# Patient Record
Sex: MALE | Race: ASIAN | NOT HISPANIC OR LATINO | Employment: FULL TIME | URBAN - METROPOLITAN AREA
[De-identification: names, ages, dates, MRNs, and addresses within clinical notes are randomized per-mention and may not be internally consistent; named-entity substitution may affect disease eponyms.]

---

## 2017-08-25 ENCOUNTER — GENERIC CONVERSION - ENCOUNTER (OUTPATIENT)
Dept: OTHER | Facility: OTHER | Age: 22
End: 2017-08-25

## 2017-08-25 ENCOUNTER — APPOINTMENT (EMERGENCY)
Dept: CT IMAGING | Facility: HOSPITAL | Age: 22
DRG: 199 | End: 2017-08-25
Payer: COMMERCIAL

## 2017-08-25 ENCOUNTER — APPOINTMENT (EMERGENCY)
Dept: RADIOLOGY | Facility: HOSPITAL | Age: 22
DRG: 199 | End: 2017-08-25
Payer: COMMERCIAL

## 2017-08-25 ENCOUNTER — HOSPITAL ENCOUNTER (INPATIENT)
Facility: HOSPITAL | Age: 22
LOS: 4 days | Discharge: HOME | DRG: 199 | End: 2017-08-29
Attending: EMERGENCY MEDICINE | Admitting: GENERAL PRACTICE
Payer: COMMERCIAL

## 2017-08-25 DIAGNOSIS — J18.9 BILATERAL PNEUMONIA: Primary | ICD-10-CM

## 2017-08-25 DIAGNOSIS — J93.83 SPONTANEOUS PNEUMOTHORAX: ICD-10-CM

## 2017-08-25 LAB
ANION GAP SERPL CALCULATED.3IONS-SCNC: 9 MMOL/L (ref 4–13)
APTT PPP: 28 SECONDS (ref 23–35)
ATRIAL RATE: 99 BPM
BASOPHILS # BLD AUTO: 0.01 THOUSANDS/ΜL (ref 0–0.1)
BASOPHILS NFR BLD AUTO: 0 % (ref 0–1)
BUN SERPL-MCNC: 13 MG/DL (ref 5–25)
CALCIUM SERPL-MCNC: 9.6 MG/DL (ref 8.3–10.1)
CHLORIDE SERPL-SCNC: 105 MMOL/L (ref 100–108)
CO2 SERPL-SCNC: 29 MMOL/L (ref 21–32)
CREAT SERPL-MCNC: 1.07 MG/DL (ref 0.6–1.3)
EOSINOPHIL # BLD AUTO: 0.05 THOUSAND/ΜL (ref 0–0.61)
EOSINOPHIL NFR BLD AUTO: 1 % (ref 0–6)
ERYTHROCYTE [DISTWIDTH] IN BLOOD BY AUTOMATED COUNT: 12 % (ref 11.6–15.1)
GFR SERPL CREATININE-BSD FRML MDRD: 97 ML/MIN/1.73SQ M
GLUCOSE SERPL-MCNC: 86 MG/DL (ref 65–140)
HCT VFR BLD AUTO: 43.7 % (ref 36.5–49.3)
HGB BLD-MCNC: 15 G/DL (ref 12–17)
HIV 1+2 AB+HIV1 P24 AG SERPL QL IA: NORMAL
HIV1 P24 AG SER QL: NORMAL
INR PPP: 0.9 (ref 0.86–1.16)
LACTATE SERPL-SCNC: 0.6 MMOL/L (ref 0.5–2)
LYMPHOCYTES # BLD AUTO: 1.64 THOUSANDS/ΜL (ref 0.6–4.47)
LYMPHOCYTES NFR BLD AUTO: 25 % (ref 14–44)
MCH RBC QN AUTO: 31.1 PG (ref 26.8–34.3)
MCHC RBC AUTO-ENTMCNC: 34.3 G/DL (ref 31.4–37.4)
MCV RBC AUTO: 91 FL (ref 82–98)
MONOCYTES # BLD AUTO: 0.44 THOUSAND/ΜL (ref 0.17–1.22)
MONOCYTES NFR BLD AUTO: 7 % (ref 4–12)
NEUTROPHILS # BLD AUTO: 4.42 THOUSANDS/ΜL (ref 1.85–7.62)
NEUTS SEG NFR BLD AUTO: 67 % (ref 43–75)
NRBC BLD AUTO-RTO: 0 /100 WBCS
P AXIS: 77 DEGREES
PLATELET # BLD AUTO: 158 THOUSANDS/UL (ref 149–390)
PLATELET # BLD AUTO: 188 THOUSANDS/UL (ref 149–390)
PMV BLD AUTO: 9.1 FL (ref 8.9–12.7)
PMV BLD AUTO: 9.3 FL (ref 8.9–12.7)
POTASSIUM SERPL-SCNC: 3.5 MMOL/L (ref 3.5–5.3)
PR INTERVAL: 130 MS
PROTHROMBIN TIME: 12.4 SECONDS (ref 12.1–14.4)
QRS AXIS: 77 DEGREES
QRSD INTERVAL: 82 MS
QT INTERVAL: 346 MS
QTC INTERVAL: 444 MS
RBC # BLD AUTO: 4.83 MILLION/UL (ref 3.88–5.62)
SODIUM SERPL-SCNC: 143 MMOL/L (ref 136–145)
T WAVE AXIS: 73 DEGREES
TROPONIN I SERPL-MCNC: <0.02 NG/ML
VENTRICULAR RATE: 99 BPM
WBC # BLD AUTO: 6.58 THOUSAND/UL (ref 4.31–10.16)

## 2017-08-25 PROCEDURE — C1769 GUIDE WIRE: HCPCS

## 2017-08-25 PROCEDURE — 96366 THER/PROPH/DIAG IV INF ADDON: CPT

## 2017-08-25 PROCEDURE — 0W9B30Z DRAINAGE OF LEFT PLEURAL CAVITY WITH DRAINAGE DEVICE, PERCUTANEOUS APPROACH: ICD-10-PCS | Performed by: RADIOLOGY

## 2017-08-25 PROCEDURE — 85610 PROTHROMBIN TIME: CPT | Performed by: PHYSICIAN ASSISTANT

## 2017-08-25 PROCEDURE — 99153 MOD SED SAME PHYS/QHP EA: CPT

## 2017-08-25 PROCEDURE — 85049 AUTOMATED PLATELET COUNT: CPT | Performed by: INTERNAL MEDICINE

## 2017-08-25 PROCEDURE — C1729 CATH, DRAINAGE: HCPCS

## 2017-08-25 PROCEDURE — 99285 EMERGENCY DEPT VISIT HI MDM: CPT

## 2017-08-25 PROCEDURE — 36415 COLL VENOUS BLD VENIPUNCTURE: CPT | Performed by: PHYSICIAN ASSISTANT

## 2017-08-25 PROCEDURE — 86705 HEP B CORE ANTIBODY IGM: CPT | Performed by: INTERNAL MEDICINE

## 2017-08-25 PROCEDURE — 71020 HB CHEST X-RAY 2VW FRONTAL&LATL: CPT

## 2017-08-25 PROCEDURE — 71275 CT ANGIOGRAPHY CHEST: CPT

## 2017-08-25 PROCEDURE — 84484 ASSAY OF TROPONIN QUANT: CPT | Performed by: PHYSICIAN ASSISTANT

## 2017-08-25 PROCEDURE — 96361 HYDRATE IV INFUSION ADD-ON: CPT

## 2017-08-25 PROCEDURE — 87040 BLOOD CULTURE FOR BACTERIA: CPT | Performed by: PHYSICIAN ASSISTANT

## 2017-08-25 PROCEDURE — 99152 MOD SED SAME PHYS/QHP 5/>YRS: CPT

## 2017-08-25 PROCEDURE — 82103 ALPHA-1-ANTITRYPSIN TOTAL: CPT | Performed by: INTERNAL MEDICINE

## 2017-08-25 PROCEDURE — 85025 COMPLETE CBC W/AUTO DIFF WBC: CPT | Performed by: PHYSICIAN ASSISTANT

## 2017-08-25 PROCEDURE — 93005 ELECTROCARDIOGRAM TRACING: CPT | Performed by: PHYSICIAN ASSISTANT

## 2017-08-25 PROCEDURE — 96365 THER/PROPH/DIAG IV INF INIT: CPT

## 2017-08-25 PROCEDURE — 96375 TX/PRO/DX INJ NEW DRUG ADDON: CPT

## 2017-08-25 PROCEDURE — 86704 HEP B CORE ANTIBODY TOTAL: CPT | Performed by: INTERNAL MEDICINE

## 2017-08-25 PROCEDURE — 86803 HEPATITIS C AB TEST: CPT | Performed by: INTERNAL MEDICINE

## 2017-08-25 PROCEDURE — 80048 BASIC METABOLIC PNL TOTAL CA: CPT | Performed by: PHYSICIAN ASSISTANT

## 2017-08-25 PROCEDURE — 32557 INSERT CATH PLEURA W/ IMAGE: CPT

## 2017-08-25 PROCEDURE — 85730 THROMBOPLASTIN TIME PARTIAL: CPT | Performed by: PHYSICIAN ASSISTANT

## 2017-08-25 PROCEDURE — 87340 HEPATITIS B SURFACE AG IA: CPT | Performed by: INTERNAL MEDICINE

## 2017-08-25 PROCEDURE — 83605 ASSAY OF LACTIC ACID: CPT | Performed by: PHYSICIAN ASSISTANT

## 2017-08-25 PROCEDURE — 87806 HIV AG W/HIV1&2 ANTB W/OPTIC: CPT | Performed by: INTERNAL MEDICINE

## 2017-08-25 RX ORDER — TRAMADOL HYDROCHLORIDE 50 MG/1
50 TABLET ORAL EVERY 6 HOURS PRN
Status: DISCONTINUED | OUTPATIENT
Start: 2017-08-25 | End: 2017-08-29 | Stop reason: HOSPADM

## 2017-08-25 RX ORDER — OXYCODONE HYDROCHLORIDE AND ACETAMINOPHEN 5; 325 MG/1; MG/1
1 TABLET ORAL EVERY 6 HOURS PRN
Status: DISCONTINUED | OUTPATIENT
Start: 2017-08-25 | End: 2017-08-25

## 2017-08-25 RX ORDER — ONDANSETRON 2 MG/ML
4 INJECTION INTRAMUSCULAR; INTRAVENOUS EVERY 6 HOURS PRN
Status: DISCONTINUED | OUTPATIENT
Start: 2017-08-25 | End: 2017-08-29 | Stop reason: HOSPADM

## 2017-08-25 RX ORDER — MIDAZOLAM HYDROCHLORIDE 1 MG/ML
INJECTION INTRAMUSCULAR; INTRAVENOUS CODE/TRAUMA/SEDATION MEDICATION
Status: COMPLETED | OUTPATIENT
Start: 2017-08-25 | End: 2017-08-25

## 2017-08-25 RX ORDER — ACETAMINOPHEN 325 MG/1
650 TABLET ORAL EVERY 6 HOURS PRN
Status: DISCONTINUED | OUTPATIENT
Start: 2017-08-25 | End: 2017-08-29 | Stop reason: HOSPADM

## 2017-08-25 RX ORDER — SULFAMETHOXAZOLE AND TRIMETHOPRIM 800; 160 MG/1; MG/1
1 TABLET ORAL ONCE
Status: COMPLETED | OUTPATIENT
Start: 2017-08-25 | End: 2017-08-25

## 2017-08-25 RX ORDER — HEPARIN SODIUM 5000 [USP'U]/ML
5000 INJECTION, SOLUTION INTRAVENOUS; SUBCUTANEOUS EVERY 8 HOURS SCHEDULED
Status: DISCONTINUED | OUTPATIENT
Start: 2017-08-25 | End: 2017-08-29 | Stop reason: HOSPADM

## 2017-08-25 RX ORDER — FENTANYL CITRATE 50 UG/ML
INJECTION, SOLUTION INTRAMUSCULAR; INTRAVENOUS CODE/TRAUMA/SEDATION MEDICATION
Status: COMPLETED | OUTPATIENT
Start: 2017-08-25 | End: 2017-08-25

## 2017-08-25 RX ORDER — HYDROCODONE BITARTRATE AND ACETAMINOPHEN 5; 325 MG/1; MG/1
1 TABLET ORAL EVERY 4 HOURS PRN
Status: DISCONTINUED | OUTPATIENT
Start: 2017-08-25 | End: 2017-08-29 | Stop reason: HOSPADM

## 2017-08-25 RX ORDER — LEVOFLOXACIN 5 MG/ML
750 INJECTION, SOLUTION INTRAVENOUS ONCE
Status: COMPLETED | OUTPATIENT
Start: 2017-08-25 | End: 2017-08-25

## 2017-08-25 RX ORDER — HYDROCODONE BITARTRATE AND ACETAMINOPHEN 5; 325 MG/1; MG/1
1 TABLET ORAL EVERY 4 HOURS PRN
Status: DISCONTINUED | OUTPATIENT
Start: 2017-08-25 | End: 2017-08-25

## 2017-08-25 RX ADMIN — FENTANYL CITRATE 50 MCG: 50 INJECTION, SOLUTION INTRAMUSCULAR; INTRAVENOUS at 14:15

## 2017-08-25 RX ADMIN — MIDAZOLAM HYDROCHLORIDE 1 MG: 1 INJECTION, SOLUTION INTRAMUSCULAR; INTRAVENOUS at 14:15

## 2017-08-25 RX ADMIN — SODIUM CHLORIDE 1000 ML: 0.9 INJECTION, SOLUTION INTRAVENOUS at 10:45

## 2017-08-25 RX ADMIN — CEFTRIAXONE 1000 MG: 1 INJECTION, POWDER, FOR SOLUTION INTRAMUSCULAR; INTRAVENOUS at 18:12

## 2017-08-25 RX ADMIN — IOHEXOL 85 ML: 350 INJECTION, SOLUTION INTRAVENOUS at 11:38

## 2017-08-25 RX ADMIN — FENTANYL CITRATE 50 MCG: 50 INJECTION, SOLUTION INTRAMUSCULAR; INTRAVENOUS at 14:09

## 2017-08-25 RX ADMIN — LEVOFLOXACIN 750 MG: 5 INJECTION, SOLUTION INTRAVENOUS at 13:46

## 2017-08-25 RX ADMIN — MIDAZOLAM HYDROCHLORIDE 1 MG: 1 INJECTION, SOLUTION INTRAMUSCULAR; INTRAVENOUS at 14:08

## 2017-08-25 RX ADMIN — TRAMADOL HYDROCHLORIDE 50 MG: 50 TABLET, COATED ORAL at 23:02

## 2017-08-25 RX ADMIN — SULFAMETHOXAZOLE AND TRIMETHOPRIM 1 TABLET: 800; 160 TABLET ORAL at 14:48

## 2017-08-25 RX ADMIN — HEPARIN SODIUM 5000 UNITS: 5000 INJECTION, SOLUTION INTRAVENOUS; SUBCUTANEOUS at 23:01

## 2017-08-25 RX ADMIN — AZITHROMYCIN MONOHYDRATE 500 MG: 500 INJECTION, POWDER, LYOPHILIZED, FOR SOLUTION INTRAVENOUS at 16:50

## 2017-08-26 PROBLEM — J18.9 COMMUNITY ACQUIRED PNEUMONIA: Status: RESOLVED | Noted: 2017-08-25 | Resolved: 2017-08-26

## 2017-08-26 LAB
A1AT SERPL-MCNC: 131 MG/DL (ref 90–200)
ERYTHROCYTE [DISTWIDTH] IN BLOOD BY AUTOMATED COUNT: 11.9 % (ref 11.6–15.1)
HCT VFR BLD AUTO: 43.9 % (ref 36.5–49.3)
HGB BLD-MCNC: 15 G/DL (ref 12–17)
MCH RBC QN AUTO: 31.2 PG (ref 26.8–34.3)
MCHC RBC AUTO-ENTMCNC: 34.2 G/DL (ref 31.4–37.4)
MCV RBC AUTO: 91 FL (ref 82–98)
PLATELET # BLD AUTO: 165 THOUSANDS/UL (ref 149–390)
PMV BLD AUTO: 9 FL (ref 8.9–12.7)
RBC # BLD AUTO: 4.81 MILLION/UL (ref 3.88–5.62)
WBC # BLD AUTO: 4.69 THOUSAND/UL (ref 4.31–10.16)

## 2017-08-26 PROCEDURE — 85027 COMPLETE CBC AUTOMATED: CPT | Performed by: FAMILY MEDICINE

## 2017-08-26 RX ADMIN — TRAMADOL HYDROCHLORIDE 50 MG: 50 TABLET, COATED ORAL at 07:36

## 2017-08-26 RX ADMIN — TRAMADOL HYDROCHLORIDE 50 MG: 50 TABLET, COATED ORAL at 13:48

## 2017-08-26 RX ADMIN — TRAMADOL HYDROCHLORIDE 50 MG: 50 TABLET, COATED ORAL at 22:39

## 2017-08-26 RX ADMIN — HEPARIN SODIUM 5000 UNITS: 5000 INJECTION, SOLUTION INTRAVENOUS; SUBCUTANEOUS at 22:40

## 2017-08-26 RX ADMIN — HEPARIN SODIUM 5000 UNITS: 5000 INJECTION, SOLUTION INTRAVENOUS; SUBCUTANEOUS at 13:48

## 2017-08-26 RX ADMIN — HEPARIN SODIUM 5000 UNITS: 5000 INJECTION, SOLUTION INTRAVENOUS; SUBCUTANEOUS at 07:36

## 2017-08-27 ENCOUNTER — APPOINTMENT (INPATIENT)
Dept: RADIOLOGY | Facility: HOSPITAL | Age: 22
DRG: 199 | End: 2017-08-27
Payer: COMMERCIAL

## 2017-08-27 LAB
HBV CORE AB SER QL: NORMAL
HBV CORE IGM SER QL: NORMAL
HBV SURFACE AG SER QL: NORMAL
HCV AB SER QL: NORMAL

## 2017-08-27 PROCEDURE — 71020 HB CHEST X-RAY 2VW FRONTAL&LATL: CPT

## 2017-08-27 RX ADMIN — HEPARIN SODIUM 5000 UNITS: 5000 INJECTION, SOLUTION INTRAVENOUS; SUBCUTANEOUS at 08:08

## 2017-08-27 RX ADMIN — HEPARIN SODIUM 5000 UNITS: 5000 INJECTION, SOLUTION INTRAVENOUS; SUBCUTANEOUS at 15:06

## 2017-08-27 RX ADMIN — HEPARIN SODIUM 5000 UNITS: 5000 INJECTION, SOLUTION INTRAVENOUS; SUBCUTANEOUS at 22:30

## 2017-08-27 RX ADMIN — TRAMADOL HYDROCHLORIDE 50 MG: 50 TABLET, COATED ORAL at 16:04

## 2017-08-28 ENCOUNTER — APPOINTMENT (INPATIENT)
Dept: RADIOLOGY | Facility: HOSPITAL | Age: 22
DRG: 199 | End: 2017-08-28
Payer: COMMERCIAL

## 2017-08-28 PROCEDURE — 71020 HB CHEST X-RAY 2VW FRONTAL&LATL: CPT

## 2017-08-28 RX ADMIN — HEPARIN SODIUM 5000 UNITS: 5000 INJECTION, SOLUTION INTRAVENOUS; SUBCUTANEOUS at 06:31

## 2017-08-28 RX ADMIN — HEPARIN SODIUM 5000 UNITS: 5000 INJECTION, SOLUTION INTRAVENOUS; SUBCUTANEOUS at 13:11

## 2017-08-28 RX ADMIN — HEPARIN SODIUM 5000 UNITS: 5000 INJECTION, SOLUTION INTRAVENOUS; SUBCUTANEOUS at 21:34

## 2017-08-28 RX ADMIN — LEVOFLOXACIN 750 MG: 500 TABLET, FILM COATED ORAL at 12:31

## 2017-08-29 ENCOUNTER — APPOINTMENT (INPATIENT)
Dept: RADIOLOGY | Facility: HOSPITAL | Age: 22
DRG: 199 | End: 2017-08-29
Payer: COMMERCIAL

## 2017-08-29 VITALS
HEIGHT: 67 IN | SYSTOLIC BLOOD PRESSURE: 117 MMHG | BODY MASS INDEX: 21.19 KG/M2 | RESPIRATION RATE: 18 BRPM | DIASTOLIC BLOOD PRESSURE: 64 MMHG | OXYGEN SATURATION: 98 % | WEIGHT: 135 LBS | TEMPERATURE: 97.9 F | HEART RATE: 107 BPM

## 2017-08-29 PROCEDURE — 71020 HB CHEST X-RAY 2VW FRONTAL&LATL: CPT

## 2017-08-29 RX ORDER — LEVOFLOXACIN 750 MG/1
750 TABLET ORAL EVERY 24 HOURS
Qty: 5 TABLET | Refills: 0 | Status: SHIPPED | OUTPATIENT
Start: 2017-08-29 | End: 2017-09-03

## 2017-08-29 RX ORDER — HYDROCODONE BITARTRATE AND ACETAMINOPHEN 5; 325 MG/1; MG/1
1 TABLET ORAL EVERY 6 HOURS PRN
Qty: 10 TABLET | Refills: 0 | Status: SHIPPED | OUTPATIENT
Start: 2017-08-29 | End: 2017-09-08

## 2017-08-29 RX ADMIN — HEPARIN SODIUM 5000 UNITS: 5000 INJECTION, SOLUTION INTRAVENOUS; SUBCUTANEOUS at 06:01

## 2017-08-29 RX ADMIN — LEVOFLOXACIN 750 MG: 500 TABLET, FILM COATED ORAL at 11:26

## 2017-08-30 LAB
BACTERIA BLD CULT: NORMAL
BACTERIA BLD CULT: NORMAL

## 2017-09-11 ENCOUNTER — GENERIC CONVERSION - ENCOUNTER (OUTPATIENT)
Dept: OTHER | Facility: OTHER | Age: 22
End: 2017-09-11

## 2017-09-11 DIAGNOSIS — J93.9 PNEUMOTHORAX: ICD-10-CM

## 2018-01-11 NOTE — PROCEDURES
Procedures by Lety Dawson DO at  8/25/2017  2:49 PM      Author:  Lety Dawson DO Service:  Interventional Radiology  Author Type:  Physician    Filed:  8/25/2017  2:50 PM Date of Service:  8/25/2017  2:49 PM Status:  Signed    :  Lety Dawson DO (Physician)         Chest tube placement:    CT guided left side chest tube placed without difficulty  Near complete resolution of pneumothorax  Patient tolerated the procedure well without complications             Received for:Francois Jaime DO  Aug 25 2017  2:51PM [facility] Standard Time

## 2018-01-22 VITALS
DIASTOLIC BLOOD PRESSURE: 60 MMHG | HEIGHT: 66 IN | SYSTOLIC BLOOD PRESSURE: 108 MMHG | HEART RATE: 82 BPM | WEIGHT: 133 LBS | BODY MASS INDEX: 21.38 KG/M2 | OXYGEN SATURATION: 96 %

## 2018-10-30 ENCOUNTER — HOSPITAL ENCOUNTER (EMERGENCY)
Facility: HOSPITAL | Age: 23
Discharge: HOME | End: 2018-10-30
Attending: EMERGENCY MEDICINE | Admitting: EMERGENCY MEDICINE
Payer: COMMERCIAL

## 2018-10-30 VITALS
WEIGHT: 128 LBS | RESPIRATION RATE: 18 BRPM | SYSTOLIC BLOOD PRESSURE: 127 MMHG | OXYGEN SATURATION: 100 % | TEMPERATURE: 98.1 F | HEIGHT: 66 IN | DIASTOLIC BLOOD PRESSURE: 71 MMHG | HEART RATE: 63 BPM | BODY MASS INDEX: 20.57 KG/M2

## 2018-10-30 DIAGNOSIS — R22.1 LUMP IN NECK: Primary | ICD-10-CM

## 2018-10-30 PROCEDURE — 86308 HETEROPHILE ANTIBODY SCREEN: CPT | Performed by: PHYSICIAN ASSISTANT

## 2018-10-30 PROCEDURE — 36415 COLL VENOUS BLD VENIPUNCTURE: CPT | Performed by: PHYSICIAN ASSISTANT

## 2018-10-30 PROCEDURE — 99283 EMERGENCY DEPT VISIT LOW MDM: CPT

## 2018-10-30 NOTE — DISCHARGE INSTRUCTIONS
Lipoma   GENERAL INFORMATION:   What is a lipoma? A lipoma is a lump made up of fat cells  It is most often found just under your skin on your shoulders, back, or neck, but can be found in other areas of your body  Multiple lipomas found in different areas of your body is called lipomatosis  Lipomas normally grow very slowly and rarely turn into cancer  What increases my risk of a lipoma? The exact cause of a lipoma is not known  Single lipomas are more common in women  Men are more likely to have lipomatosis  The following may increase your risk of getting a lipoma or lipomatosis:  · Family history of lipoma     · Certain medical conditions, such as liver disease, or problems controlling your blood sugar    · Obesity    · A blunt blow or injury to your body  What are the signs and symptoms of a lipoma? Lipomas can occur anywhere in your body and cause signs and symptoms depending on where they occur  Most often, you have a soft, round, movable lump just under your skin  The lump may be painful, but this is not common  How is a lipoma diagnosed? Your caregiver will examine you  He may feel the area around your lipoma  Tell your caregiver about any signs and symptoms you have  You may need any of the following:  · Ultrasound: An ultrasound uses sound waves to show pictures on a monitor  An ultrasound may be done to look at your lipoma and surrounding tissue  · X-ray:  An x-ray may be taken to check for lipomas in your muscles and other areas of your body  · CT scan: This test is also called a CAT scan  An x-ray machine uses a computer to take pictures of your lipoma and nearby tissue  You may be given a dye before the pictures are taken to help caregivers see the pictures better  Tell the caregiver if you have ever had an allergic reaction to contrast dye  · MRI:  This scan uses powerful magnets and a computer to take pictures of your lipoma and nearby tissue   You may be given dye to help the pictures show up better  Tell the caregiver if you have ever had an allergic reaction to contrast dye  Do not enter the MRI room with anything metal  Metal can cause serious injury  Tell the caregiver if you have any metal in or on your body  · Biopsy:  During this procedure, a small amount of tissue is removed from your lipoma  The tissue sample will be sent to a lab for tests  How is a lipoma treated? You may need treatment if your lipoma grows and causes symptoms such as pain  You may choose to have your lipoma treated if you do not like how it looks  Treatment may include any of the following:  · Steroid injections: This is given as a shot into your lipoma to help it shrink  · Liposuction:  During this procedure, your caregiver will use a syringe with a needle to remove your lipoma  He may also insert a scope and tools through a small incision  A scope is a thin, flexible tube with a light and tiny camera on the end  This will help him see and remove your lipoma  · Surgical removal:  Your caregiver will remove your lipoma through an incision in your skin  Anesthesia medicine will be used to numb the surgery site  A drain may be put into your skin to remove extra blood or fluid from your surgery area  The incision may be closed with stitches and a bandage may cover your wound  What are the risks of a lipoma? · Treatment for your lipoma may cause pain, swelling, and bruising  Liposuction may cause dimpling or color changes in your skin  Surgical removal of your lipoma may cause a scar  With surgery, a seroma (pocket of fluid) may form in nearby tissue or organs  Your nerves may be damaged and cause numbness or tingling in your skin  Your muscles may also be injured, and you may bleed more than expected or get an infection  You may need to have more than one treatment for your lipoma  Even with treatment, your lipoma may not be completely removed, and it may increase in size again      · Without treatment, your lipoma may become large and painful  A lipoma in your bowel may block bowel movements  It may also injure nearby tissue causing a hemorrhage (heavy bleeding)  Lipomas in your neck and throat may cause you to choke, have trouble breathing, and be life-threatening  When should I contact my caregiver? Contact your caregiver if:  · You have blood in your bowel movement  · Your lipoma increases in size  · Your lipoma is painful or you have pain in the area of your lipoma  · You have questions or concerns about your condition or care  When should I seek immediate care? Seek care immediately or call 911 if:  · You feel a lump in your throat or have trouble swallowing  · You suddenly have trouble breathing  CARE AGREEMENT:   You have the right to help plan your care  Learn about your health condition and how it may be treated  Discuss treatment options with your caregivers to decide what care you want to receive  You always have the right to refuse treatment  The above information is an  only  It is not intended as medical advice for individual conditions or treatments  Talk to your doctor, nurse or pharmacist before following any medical regimen to see if it is safe and effective for you  © 2014 [address] is for End User's use only and may not be sold, redistributed or otherwise used for commercial purposes  All illustrations and images included in CareNotes® are the copyrighted property of [employer]  or [employer]

## 2018-10-30 NOTE — ED PROVIDER NOTES
History  Chief Complaint   Patient presents with    Mass     Patient c/o of mass on his left side of his neck that he states he has had for some time however now it has been causing him pain over the last few days  This is a 44-year-old male patient presents for evaluation of a lump in his left neck  States he has had his whole life, previously had excised in over the past few weeks noticed it was returning growing in size  Over the past couple days now painful  Dull aching pain  Not severe  He has no other complaints, denies fevers chills nausea vomiting  No rash  No recent illnesses  No URI symptoms  No trauma or injuries  Denies sore throat or difficulty breathing  He does not recall what it was diagnosed as the last time he had this excised  No history of cancer  No history of weight loss  No night sweats  History provided by:  Patient   used: No    Medical Problem   Location:  Lump left neck  Quality:  Lump  Severity:  Mild  Onset quality:  Gradual  Duration:  1 week  Timing:  Constant  Progression:  Worsening  Chronicity:  Recurrent  Context:  See HPI  Relieved by:  Nothing  Worsened by:  Palpation  Ineffective treatments:  None tried  Associated symptoms: no abdominal pain, no chest pain, no congestion, no cough, no diarrhea, no ear pain, no fatigue, no fever, no headaches, no loss of consciousness, no myalgias, no nausea, no rash, no rhinorrhea, no shortness of breath, no sore throat, no vomiting and no wheezing        None       No past medical history on file  Past Surgical History:   Procedure Laterality Date    KNEE SURGERY         Family History   Problem Relation Age of Onset    Family history unknown: Yes     I have reviewed and agree with the history as documented      Social History   Substance Use Topics    Smoking status: Never Smoker    Smokeless tobacco: Never Used    Alcohol use No        Review of Systems   Constitutional: Negative for activity change, appetite change, chills, diaphoresis, fatigue, fever and unexpected weight change  HENT: Negative for congestion, ear pain, rhinorrhea, sinus pressure, sore throat and trouble swallowing  Eyes: Negative for photophobia and visual disturbance  Respiratory: Negative for apnea, cough, choking, chest tightness, shortness of breath, wheezing and stridor  Cardiovascular: Negative for chest pain, palpitations and leg swelling  Gastrointestinal: Negative for abdominal distention, abdominal pain, blood in stool, constipation, diarrhea, nausea and vomiting  Genitourinary: Negative for decreased urine volume, difficulty urinating, dysuria, enuresis, flank pain, frequency, hematuria and urgency  Musculoskeletal: Negative for arthralgias, myalgias, neck pain and neck stiffness  Skin: Negative for color change, pallor, rash and wound  Allergic/Immunologic: Negative  Neurological: Negative for dizziness, tremors, loss of consciousness, syncope, weakness, light-headedness, numbness and headaches  Hematological: Negative  Psychiatric/Behavioral: Negative  All other systems reviewed and are negative  Physical Exam  Physical Exam   Constitutional: He is oriented to person, place, and time  He appears well-developed and well-nourished  Non-toxic appearance  He does not have a sickly appearance  He does not appear ill  No distress  HENT:   Head: Normocephalic and atraumatic  Eyes: Pupils are equal, round, and reactive to light  EOM and lids are normal    Neck: Normal range of motion  Neck supple  Cardiovascular: Normal rate, regular rhythm, S1 normal, S2 normal, normal heart sounds, intact distal pulses and normal pulses  Exam reveals no gallop, no distant heart sounds, no friction rub and no decreased pulses  No murmur heard  Pulses:       Radial pulses are 2+ on the right side, and 2+ on the left side     Pulmonary/Chest: Effort normal and breath sounds normal  No accessory muscle usage  No apnea, no tachypnea and no bradypnea  No respiratory distress  He has no decreased breath sounds  He has no wheezes  He has no rhonchi  He has no rales  Abdominal: Soft  Normal appearance  He exhibits no distension  There is no tenderness  There is no rigidity, no rebound and no guarding  Musculoskeletal: Normal range of motion  He exhibits no edema, tenderness or deformity  Neurological: He is alert and oriented to person, place, and time  No cranial nerve deficit  GCS eye subscore is 4  GCS verbal subscore is 5  GCS motor subscore is 6  Skin: Skin is warm, dry and intact  No rash noted  He is not diaphoretic  No erythema  No pallor  Psychiatric: His speech is normal    Nursing note and vitals reviewed  Vital Signs  ED Triage Vitals [10/30/18 1657]   Temperature Pulse Respirations Blood Pressure SpO2   98 1 °F (36 7 °C) 63 18 127/71 100 %      Temp Source Heart Rate Source Patient Position - Orthostatic VS BP Location FiO2 (%)   Oral Monitor Sitting Left arm --      Pain Score       --           Vitals:    10/30/18 1657   BP: 127/71   Pulse: 63   Patient Position - Orthostatic VS: Sitting       Visual Acuity      ED Medications  Medications - No data to display    Diagnostic Studies  Results Reviewed     Procedure Component Value Units Date/Time    Mononucleosis screen [[id number]] Collected:  10/30/18 1832    Lab Status: In process Specimen:  Blood from Arm, Left Updated:  10/30/18 1834                 No orders to display              Procedures  Procedures       Phone Contacts  ED Phone Contact    ED Course                               MDM  Number of Diagnoses or Management Options  Lump in neck: new and requires workup  Diagnosis management comments: Differential diagnosis includes but is not limited to lymphadenopathy, lipoma, doubt abscess, doubt folliculitis, doubt tumor malignancy  Plan:  Bedside ultrasound reveals no fluid collection    Appears to be fatty tissue  Risk of Complications, Morbidity, and/or Mortality  Presenting problems: low  Management options: low  General comments: 24-year-old male with a lump on left neck  It is minimally tender  There is no fluctuance or induration  It is rubbery consistency  Bedside ultrasound overall unremarkable  There is no fluid collection on ultrasound, appears more like fatty tissue, this is likely a lipoma  Recommended he follow up with his PCP and may ultimately need to see surgery, if this is becoming worsening painful he can discuss excision with surgeon  There is no evidence of abscess at this point time  Will defer any I and D, patient understands and agrees this plan  Mono test sent for possible mononucleosis/lymphadenopathy  Results pending    Patient Progress  Patient progress: stable    CritCare Time    Disposition  Final diagnoses:   Lump in neck - left sided     Time reflects when diagnosis was documented in both MDM as applicable and the Disposition within this note     Time User Action Codes Description Comment    10/30/2018  7:10 PM Michellealfredito Hernandez Add [R22 1] Lump in neck     10/30/2018  7:10 PM Emily Diaz Modify [R22 1] Lump in neck left sided      ED Disposition     ED Disposition Condition Comment    Discharge  [facility] discharge to home/self care  Condition at discharge: Good        Follow-up Information     Follow up With Specialties Details Why Contact Info    Huang Aguillon MD Pulmonary Disease   [address] [facility] [address]  [place] [facility] [address]      Jeanine Maldonado MD General Surgery Call for evaluation of possible lipoma [address]  [address] [phone]            There are no discharge medications for this patient  No discharge procedures on file      ED Provider  Electronically Signed by           Taisha Dunn PA-C  10/30/18 5884

## 2018-10-31 LAB — HETEROPH AB SER QL: NEGATIVE

## 2018-11-01 ENCOUNTER — TELEPHONE (OUTPATIENT)
Dept: SURGERY | Facility: CLINIC | Age: 23
End: 2018-11-01

## 2018-11-05 ENCOUNTER — OFFICE VISIT (OUTPATIENT)
Dept: SURGERY | Facility: CLINIC | Age: 23
End: 2018-11-05
Payer: COMMERCIAL

## 2018-11-05 VITALS
TEMPERATURE: 97.7 F | DIASTOLIC BLOOD PRESSURE: 60 MMHG | BODY MASS INDEX: 21.06 KG/M2 | HEART RATE: 72 BPM | WEIGHT: 131.04 LBS | HEIGHT: 66 IN | SYSTOLIC BLOOD PRESSURE: 106 MMHG | RESPIRATION RATE: 12 BRPM

## 2018-11-05 DIAGNOSIS — R22.1 MASS OF LEFT SIDE OF NECK: Primary | ICD-10-CM

## 2018-11-05 PROCEDURE — 99204 OFFICE O/P NEW MOD 45 MIN: CPT | Performed by: SURGERY

## 2018-11-05 NOTE — PROGRESS NOTES
GENERAL SURGERY HISTORY AND PHYSICAL     Diana King 24 y o  male MRN: [id number]  Unit/Bed#:  Encounter: [id number]      Assessment/Plan   1  Mass of left side of neck  Case request operating room: EXCISION BIOPSY LESION /MASS LEFT NECK    Case request operating room: EXCISION BIOPSY LESION /MASS LEFT NECK     Patient presenting with recurrent mass the left neck  Previous excision approximately 10 years ago  Given the description of drainage and recurrence could be questionable cyst versus lipoma which I consider less likely  Discussed excision with the patient  I discussed the risks of the procedure with the patient including bleeding, infection, damage to critical structures potentially necessitating further procedures including but not limited to underlying neurovascular structures critically including spinal accessory nerve  Discussed with the patient risk of spinal accessory nerve injury which can have very catastrophic outcomes and permanent disability  I also discussed with the patient mass may reoccur  Their questions were answered to their satisfaction ready to proceed, we will schedule for left neck mass excision under local anesthetic  Chief Complaint left neck mass    HPI: Diana King is a 24y o  year old male who presents with mass of the left neck  Patient has had the mass for some time  Had a previous excision at the same site proximally 10 years ago  Patient is not certain what the pathology of that time was whether cyst or lipoma  Says masses been slow-growing ever since as he does have occasional drainage from the site  Has occasional tenderness as well  No other history of soft tissue masses  No family history of any soft tissue masses or cancers  Patient's past surgical history includes previous neck excision as well as knee surgery  Patient denies any bleeding or infectious complications regarding the previous surgical procedures    Patient denies any personal history of any bleeding or bruising issues  Patient denies any chest pain, shortness of breath, or exertional dyspnea/angina  Able to climb a flight of stairs with no difficulty  ROS:  12 Point ROS reviewed and negative except for:  Left neck mass    Historical Information   No past medical history on file  Past Surgical History:   Procedure Laterality Date    KNEE SURGERY       Social History   History   Alcohol Use    1 2 oz/week    2 Cans of beer per week     Comment: socially      History   Drug Use No     History   Smoking Status    Never Smoker   Smokeless Tobacco    Never Used     Family History: no pertinent family history  No Known Allergies  Meds/Allergies   all current active meds have been reviewed      Objective   Vitals: Blood pressure 106/60, pulse 72, temperature 97 7 °F (36 5 °C), temperature source Oral, resp  rate 12, height 5' 6" (1 676 m), weight 59 4 kg (131 lb 0 6 oz)  ,Body mass index is 21 15 kg/m²  Physical Exam:    General appearance: Awake alert oriented no acute distress  HEENT: Sclera clear, anicterus, no discharge  Neck: Trachea is midline, no adenopathy  Palpable superficial mass on the left posterior neck inferior to the hairline  Questionable scar or site of previous drainage  No expressible drainage from the mass at this time  No overlying erythema no surrounding induration  Mass is firm rubbery in consistency  Measures approximately 1 5 cm in diameter  There are no other masses present in the neck no posterior cervical adenopathy    Lungs: No respiratory distress, clear to auscultation bilaterally  Heart[de-identified] RRR  Abdomen: soft, nondistended, nontender  Extremities: No edema, nontender  Skin:No rashes or lesions  Neurologic: No weakness or loss of sensation  Psych: Affect appropriate    Baldemar Sprague MD  11/5/2018

## 2018-11-26 NOTE — PRE-PROCEDURE INSTRUCTIONS
No outpatient prescriptions have been marked as taking for the 11/27/18 encounter [facility] Encounter)

## 2018-11-27 ENCOUNTER — HOSPITAL ENCOUNTER (OUTPATIENT)
Facility: HOSPITAL | Age: 23
Setting detail: OUTPATIENT SURGERY
Discharge: HOME | End: 2018-11-27
Attending: SURGERY | Admitting: SURGERY
Payer: COMMERCIAL

## 2018-11-27 VITALS
HEIGHT: 66 IN | WEIGHT: 141.98 LBS | BODY MASS INDEX: 22.82 KG/M2 | HEART RATE: 78 BPM | RESPIRATION RATE: 16 BRPM | SYSTOLIC BLOOD PRESSURE: 118 MMHG | OXYGEN SATURATION: 99 % | TEMPERATURE: 97.5 F | DIASTOLIC BLOOD PRESSURE: 64 MMHG

## 2018-11-27 DIAGNOSIS — R22.1 MASS OF LEFT SIDE OF NECK: Primary | ICD-10-CM

## 2018-11-27 PROCEDURE — 88304 TISSUE EXAM BY PATHOLOGIST: CPT | Performed by: PATHOLOGY

## 2018-11-27 PROCEDURE — 12041 INTMD RPR N-HF/GENIT 2.5CM/<: CPT | Performed by: SURGERY

## 2018-11-27 PROCEDURE — 11422 EXC H-F-NK-SP B9+MARG 1.1-2: CPT | Performed by: SURGERY

## 2018-11-27 RX ORDER — MAGNESIUM HYDROXIDE 1200 MG/15ML
LIQUID ORAL AS NEEDED
Status: DISCONTINUED | OUTPATIENT
Start: 2018-11-27 | End: 2018-11-27 | Stop reason: HOSPADM

## 2018-11-27 RX ORDER — ACETAMINOPHEN 500 MG
500 TABLET ORAL EVERY 6 HOURS PRN
Qty: 30 TABLET | Refills: 0 | COMMUNITY
Start: 2018-11-27

## 2018-11-27 RX ORDER — LIDOCAINE HYDROCHLORIDE AND EPINEPHRINE 10; 10 MG/ML; UG/ML
INJECTION, SOLUTION INFILTRATION; PERINEURAL AS NEEDED
Status: DISCONTINUED | OUTPATIENT
Start: 2018-11-27 | End: 2018-11-27 | Stop reason: HOSPADM

## 2018-11-27 RX ORDER — ACETAMINOPHEN 325 MG/1
650 TABLET ORAL EVERY 6 HOURS PRN
Status: DISCONTINUED | OUTPATIENT
Start: 2018-11-27 | End: 2018-11-27 | Stop reason: HOSPADM

## 2018-11-27 RX ORDER — IBUPROFEN 400 MG/1
800 TABLET ORAL EVERY 8 HOURS PRN
Status: DISCONTINUED | OUTPATIENT
Start: 2018-11-27 | End: 2018-11-27 | Stop reason: HOSPADM

## 2018-11-27 RX ORDER — IBUPROFEN 800 MG/1
800 TABLET ORAL EVERY 8 HOURS PRN
Qty: 30 TABLET | Refills: 0 | COMMUNITY
Start: 2018-11-27

## 2018-11-27 NOTE — OP NOTE
OPERATIVE REPORT  PATIENT NAME: Kylee Purdy    :  1995  MRN: [id number]  Pt Location: [facility] OR ROOM 03    SURGERY DATE: 2018    Surgeon(s) and Role:     * Sameer Burgos MD - Primary    Preop Diagnosis:  Mass of left side of neck [R22 1]    Post-Op Diagnosis Codes:     * Mass of left side of neck [R22 1]    Procedure(s) (LRB):  NECK MASS EXCISION BIOPSY (Left)  Intermediate  CLOSURE WOUND  2 cm left neck (N/A)    Specimen(s):  ID Type Source Tests Collected by Time Destination   1 : LEFT NECK MASS Tissue Neck TISSUE EXAM Sameer Burgos MD 2018 0815        Estimated Blood Loss:   Minimal    Drains:       Anesthesia Type:   Local    Operative Indications: Mass of left side of neck [R22 1]  Recurrent mass the left neck, previous excision of a mass  Slow-growing  Operative Findings:  2 cm mass the left necked appearance consistent with sebaceous cyst, sebum containing  No evidence of involvement of deeper structures or acute infection  Wound closed in 2 layers, 2 cm incision length    Complications:   None    Procedure and Technique:  Patient was transferred to the operating table  Anesthesia was introduced with no complication  Patient was placed into the supine sitting up with head rotated to the right to expose the left neck position  Surgical site was prepped and draped in a sterile fashion  Preop timeout was performed with all members of the surgical staff present, all agreed on the patient identifiers as well as the procedure to be performed  Local anesthetic was infused over the mass located left neck just below the hairline  An elliptical incision was made over the mass first with 15 blade and continued with electrocautery  A combination of electrocautery and blunt dissection was used to circumferentially remove the mass  Its appearance was consistent with a sebaceous cyst, cystic structure sebum containing no inflammatory changes, it did not involve any deeper structures   Final incision length was 2 cm  3-0 vicryl sutures were placed in the dermis  Final skin closure was done with a running 4-0 Monocryl subcuticular  Final dressing was Histacryl  Patient had no weakness on left shoulder shrug after excision  Patient tolerated procedure well there were no complications  Instrument and sponge counts were correct at end at the end of the procedure       I was present for the entire procedure and A qualified resident physician was not available    Patient Disposition:  PACU     SIGNATURE: Chiquita Pagan MD  DATE: November 27, 2018  TIME: 8:31 AM

## 2018-11-27 NOTE — H&P (VIEW-ONLY)
GENERAL SURGERY HISTORY AND PHYSICAL     Diana King 24 y o  male MRN: [id number]  Unit/Bed#:  Encounter: [id number]      Assessment/Plan   1  Mass of left side of neck  Case request operating room: EXCISION BIOPSY LESION /MASS LEFT NECK    Case request operating room: EXCISION BIOPSY LESION /MASS LEFT NECK     Patient presenting with recurrent mass the left neck  Previous excision approximately 10 years ago  Given the description of drainage and recurrence could be questionable cyst versus lipoma which I consider less likely  Discussed excision with the patient  I discussed the risks of the procedure with the patient including bleeding, infection, damage to critical structures potentially necessitating further procedures including but not limited to underlying neurovascular structures critically including spinal accessory nerve  Discussed with the patient risk of spinal accessory nerve injury which can have very catastrophic outcomes and permanent disability  I also discussed with the patient mass may reoccur  Their questions were answered to their satisfaction ready to proceed, we will schedule for left neck mass excision under local anesthetic  Chief Complaint left neck mass    HPI: Diana King is a 24y o  year old male who presents with mass of the left neck  Patient has had the mass for some time  Had a previous excision at the same site proximally 10 years ago  Patient is not certain what the pathology of that time was whether cyst or lipoma  Says masses been slow-growing ever since as he does have occasional drainage from the site  Has occasional tenderness as well  No other history of soft tissue masses  No family history of any soft tissue masses or cancers  Patient's past surgical history includes previous neck excision as well as knee surgery  Patient denies any bleeding or infectious complications regarding the previous surgical procedures    Patient denies any personal history of any bleeding or bruising issues  Patient denies any chest pain, shortness of breath, or exertional dyspnea/angina  Able to climb a flight of stairs with no difficulty  ROS:  12 Point ROS reviewed and negative except for:  Left neck mass    Historical Information   No past medical history on file  Past Surgical History:   Procedure Laterality Date    KNEE SURGERY       Social History   History   Alcohol Use    1 2 oz/week    2 Cans of beer per week     Comment: socially      History   Drug Use No     History   Smoking Status    Never Smoker   Smokeless Tobacco    Never Used     Family History: no pertinent family history  No Known Allergies  Meds/Allergies   all current active meds have been reviewed      Objective   Vitals: Blood pressure 106/60, pulse 72, temperature 97 7 °F (36 5 °C), temperature source Oral, resp  rate 12, height 5' 6" (1 676 m), weight 59 4 kg (131 lb 0 6 oz)  ,Body mass index is 21 15 kg/m²  Physical Exam:    General appearance: Awake alert oriented no acute distress  HEENT: Sclera clear, anicterus, no discharge  Neck: Trachea is midline, no adenopathy  Palpable superficial mass on the left posterior neck inferior to the hairline  Questionable scar or site of previous drainage  No expressible drainage from the mass at this time  No overlying erythema no surrounding induration  Mass is firm rubbery in consistency  Measures approximately 1 5 cm in diameter  There are no other masses present in the neck no posterior cervical adenopathy    Lungs: No respiratory distress, clear to auscultation bilaterally  Heart[de-identified] RRR  Abdomen: soft, nondistended, nontender  Extremities: No edema, nontender  Skin:No rashes or lesions  Neurologic: No weakness or loss of sensation  Psych: Affect appropriate    Dione Yanes MD  11/5/2018

## 2018-12-10 ENCOUNTER — OFFICE VISIT (OUTPATIENT)
Dept: SURGERY | Facility: CLINIC | Age: 23
End: 2018-12-10

## 2018-12-10 VITALS
TEMPERATURE: 97.4 F | WEIGHT: 139 LBS | DIASTOLIC BLOOD PRESSURE: 78 MMHG | SYSTOLIC BLOOD PRESSURE: 120 MMHG | BODY MASS INDEX: 22.44 KG/M2

## 2018-12-10 DIAGNOSIS — R22.1 MASS OF LEFT SIDE OF NECK: Primary | ICD-10-CM

## 2018-12-10 PROCEDURE — 99024 POSTOP FOLLOW-UP VISIT: CPT | Performed by: SURGERY

## 2018-12-10 NOTE — PROGRESS NOTES
Post Operative Note    Subjective:  Patient is s/p Excision of left neck mass  Pain is well controlled  No incisional complaints  Exam:  AAO, NAD  Left neck:  Partial dehiscence of incision  Dried scab present in the wound  No surrounding induration or erythema no expressible drainage  Assessment/Plan:  Patient is status post excision of left neck mass  Patient is recovering well  Pathology reviewed with the patient shows cyst   Partial dehiscence of the mass with overlying scab no evidence of infection  Instructed patient to allow the scabbed if leg off if there is any open wound left trying keep it covered keep moisture within it  Otherwise no aggressive wound measures needed  Patient may follow-up as needed for any concerns about the wound in the future  Otherwise no further surgical issues follow-up as needed

## 2022-02-09 ENCOUNTER — APPOINTMENT (EMERGENCY)
Dept: RADIOLOGY | Facility: HOSPITAL | Age: 27
DRG: 201 | End: 2022-02-09
Payer: COMMERCIAL

## 2022-02-09 ENCOUNTER — HOSPITAL ENCOUNTER (INPATIENT)
Facility: HOSPITAL | Age: 27
LOS: 1 days | Discharge: HOME | DRG: 201 | End: 2022-02-12
Attending: EMERGENCY MEDICINE | Admitting: STUDENT IN AN ORGANIZED HEALTH CARE EDUCATION/TRAINING PROGRAM
Payer: COMMERCIAL

## 2022-02-09 DIAGNOSIS — J93.9 PNEUMOTHORAX: Primary | ICD-10-CM

## 2022-02-09 DIAGNOSIS — J93.83 SPONTANEOUS PNEUMOTHORAX: ICD-10-CM

## 2022-02-09 LAB
BASOPHILS # BLD AUTO: 0.02 THOUSANDS/ΜL (ref 0–0.1)
BASOPHILS NFR BLD AUTO: 0 % (ref 0–1)
EOSINOPHIL # BLD AUTO: 0.21 THOUSAND/ΜL (ref 0–0.61)
EOSINOPHIL NFR BLD AUTO: 3 % (ref 0–6)
ERYTHROCYTE [DISTWIDTH] IN BLOOD BY AUTOMATED COUNT: 12 % (ref 11.6–15.1)
HCT VFR BLD AUTO: 45.1 % (ref 36.5–49.3)
HGB BLD-MCNC: 15.4 G/DL (ref 12–17)
IMM GRANULOCYTES # BLD AUTO: 0.01 THOUSAND/UL (ref 0–0.2)
IMM GRANULOCYTES NFR BLD AUTO: 0 % (ref 0–2)
LYMPHOCYTES # BLD AUTO: 0.79 THOUSANDS/ΜL (ref 0.6–4.47)
LYMPHOCYTES NFR BLD AUTO: 11 % (ref 14–44)
MCH RBC QN AUTO: 30.9 PG (ref 26.8–34.3)
MCHC RBC AUTO-ENTMCNC: 34.1 G/DL (ref 31.4–37.4)
MCV RBC AUTO: 91 FL (ref 82–98)
MONOCYTES # BLD AUTO: 0.49 THOUSAND/ΜL (ref 0.17–1.22)
MONOCYTES NFR BLD AUTO: 7 % (ref 4–12)
NEUTROPHILS # BLD AUTO: 6.02 THOUSANDS/ΜL (ref 1.85–7.62)
NEUTS SEG NFR BLD AUTO: 79 % (ref 43–75)
NRBC BLD AUTO-RTO: 0 /100 WBCS
PLATELET # BLD AUTO: 193 THOUSANDS/UL (ref 149–390)
PMV BLD AUTO: 9.3 FL (ref 8.9–12.7)
RBC # BLD AUTO: 4.98 MILLION/UL (ref 3.88–5.62)
WBC # BLD AUTO: 7.54 THOUSAND/UL (ref 4.31–10.16)

## 2022-02-09 PROCEDURE — 99285 EMERGENCY DEPT VISIT HI MDM: CPT

## 2022-02-09 PROCEDURE — 85025 COMPLETE CBC W/AUTO DIFF WBC: CPT | Performed by: SURGERY

## 2022-02-09 PROCEDURE — 96374 THER/PROPH/DIAG INJ IV PUSH: CPT

## 2022-02-09 PROCEDURE — 36415 COLL VENOUS BLD VENIPUNCTURE: CPT | Performed by: SURGERY

## 2022-02-09 PROCEDURE — 96361 HYDRATE IV INFUSION ADD-ON: CPT

## 2022-02-09 PROCEDURE — 0W9930Z DRAINAGE OF RIGHT PLEURAL CAVITY WITH DRAINAGE DEVICE, PERCUTANEOUS APPROACH: ICD-10-PCS | Performed by: STUDENT IN AN ORGANIZED HEALTH CARE EDUCATION/TRAINING PROGRAM

## 2022-02-09 PROCEDURE — 80048 BASIC METABOLIC PNL TOTAL CA: CPT | Performed by: SURGERY

## 2022-02-09 PROCEDURE — 93005 ELECTROCARDIOGRAM TRACING: CPT

## 2022-02-09 PROCEDURE — 71046 X-RAY EXAM CHEST 2 VIEWS: CPT

## 2022-02-09 RX ORDER — LIDOCAINE HYDROCHLORIDE 10 MG/ML
10 INJECTION, SOLUTION EPIDURAL; INFILTRATION; INTRACAUDAL; PERINEURAL ONCE
Status: COMPLETED | OUTPATIENT
Start: 2022-02-10 | End: 2022-02-10

## 2022-02-09 RX ORDER — LIDOCAINE HYDROCHLORIDE 10 MG/ML
10 INJECTION, SOLUTION EPIDURAL; INFILTRATION; INTRACAUDAL; PERINEURAL ONCE
Status: COMPLETED | OUTPATIENT
Start: 2022-02-09 | End: 2022-02-09

## 2022-02-09 RX ORDER — KETAMINE HCL IN NACL, ISO-OSM 100MG/10ML
20 SYRINGE (ML) INJECTION ONCE
Status: DISCONTINUED | OUTPATIENT
Start: 2022-02-09 | End: 2022-02-09

## 2022-02-09 RX ORDER — FENTANYL CITRATE 50 UG/ML
50 INJECTION, SOLUTION INTRAMUSCULAR; INTRAVENOUS ONCE
Status: COMPLETED | OUTPATIENT
Start: 2022-02-09 | End: 2022-02-09

## 2022-02-09 RX ADMIN — SODIUM CHLORIDE 1000 ML: 0.9 INJECTION, SOLUTION INTRAVENOUS at 23:42

## 2022-02-09 RX ADMIN — FENTANYL CITRATE 50 MCG: 50 INJECTION INTRAMUSCULAR; INTRAVENOUS at 23:33

## 2022-02-09 RX ADMIN — LIDOCAINE HYDROCHLORIDE 10 ML: 10 INJECTION, SOLUTION EPIDURAL; INFILTRATION; INTRACAUDAL at 23:34

## 2022-02-10 ENCOUNTER — APPOINTMENT (EMERGENCY)
Dept: RADIOLOGY | Facility: HOSPITAL | Age: 27
DRG: 201 | End: 2022-02-10
Payer: COMMERCIAL

## 2022-02-10 LAB
ANION GAP SERPL CALCULATED.3IONS-SCNC: 6 MMOL/L (ref 4–13)
ATRIAL RATE: 100 BPM
ATRIAL RATE: 99 BPM
BUN SERPL-MCNC: 14 MG/DL (ref 5–25)
CALCIUM SERPL-MCNC: 8.8 MG/DL (ref 8.3–10.1)
CHLORIDE SERPL-SCNC: 103 MMOL/L (ref 100–108)
CO2 SERPL-SCNC: 30 MMOL/L (ref 21–32)
CREAT SERPL-MCNC: 0.85 MG/DL (ref 0.6–1.3)
GFR SERPL CREATININE-BSD FRML MDRD: 119 ML/MIN/1.73SQ M
GLUCOSE SERPL-MCNC: 107 MG/DL (ref 65–140)
P AXIS: 91 DEGREES
P AXIS: 94 DEGREES
POTASSIUM SERPL-SCNC: 3.5 MMOL/L (ref 3.5–5.3)
PR INTERVAL: 122 MS
PR INTERVAL: 126 MS
QRS AXIS: 91 DEGREES
QRS AXIS: 92 DEGREES
QRSD INTERVAL: 86 MS
QRSD INTERVAL: 88 MS
QT INTERVAL: 318 MS
QT INTERVAL: 324 MS
QTC INTERVAL: 408 MS
QTC INTERVAL: 417 MS
SODIUM SERPL-SCNC: 139 MMOL/L (ref 136–145)
T WAVE AXIS: 104 DEGREES
T WAVE AXIS: 91 DEGREES
VENTRICULAR RATE: 100 BPM
VENTRICULAR RATE: 99 BPM

## 2022-02-10 PROCEDURE — 99219 PR INITIAL OBSERVATION CARE/DAY 50 MINUTES: CPT | Performed by: INTERNAL MEDICINE

## 2022-02-10 PROCEDURE — 99285 EMERGENCY DEPT VISIT HI MDM: CPT | Performed by: SURGERY

## 2022-02-10 PROCEDURE — 93010 ELECTROCARDIOGRAM REPORT: CPT | Performed by: INTERNAL MEDICINE

## 2022-02-10 PROCEDURE — 32554 ASPIRATE PLEURA W/O IMAGING: CPT | Performed by: SURGERY

## 2022-02-10 PROCEDURE — 99284 EMERGENCY DEPT VISIT MOD MDM: CPT | Performed by: INTERNAL MEDICINE

## 2022-02-10 PROCEDURE — 71045 X-RAY EXAM CHEST 1 VIEW: CPT

## 2022-02-10 RX ORDER — ACETAMINOPHEN 325 MG/1
650 TABLET ORAL EVERY 6 HOURS PRN
Status: DISCONTINUED | OUTPATIENT
Start: 2022-02-10 | End: 2022-02-11 | Stop reason: SDUPTHER

## 2022-02-10 RX ORDER — ACETAMINOPHEN 325 MG/1
975 TABLET ORAL ONCE
Status: COMPLETED | OUTPATIENT
Start: 2022-02-10 | End: 2022-02-10

## 2022-02-10 RX ORDER — ACETAMINOPHEN 325 MG/1
650 TABLET ORAL EVERY 6 HOURS PRN
Status: DISCONTINUED | OUTPATIENT
Start: 2022-02-10 | End: 2022-02-12 | Stop reason: HOSPADM

## 2022-02-10 RX ADMIN — ACETAMINOPHEN 650 MG: 325 TABLET, FILM COATED ORAL at 19:20

## 2022-02-10 RX ADMIN — LIDOCAINE HYDROCHLORIDE 10 ML: 10 INJECTION, SOLUTION EPIDURAL; INFILTRATION; INTRACAUDAL; PERINEURAL at 00:24

## 2022-02-10 RX ADMIN — MORPHINE SULFATE 2 MG: 2 INJECTION, SOLUTION INTRAMUSCULAR; INTRAVENOUS at 06:21

## 2022-02-10 RX ADMIN — ACETAMINOPHEN 975 MG: 325 TABLET, FILM COATED ORAL at 04:55

## 2022-02-10 NOTE — CONSULTS
Consultation - Pulmonary Medicine   Navdeep Jiang 31 y o  male MRN: [id number]  Unit/Bed#: ED 22 Encounter: [id number]      Assessment:  1  Spontaneous right pneumothorax  2  History of spontaneous left pneumothorax in 2017    Plan:   Patient presented with onset of chest pain and shortness of breath after sneezing at work  Found to have moderate-sized right pneumothorax and is status post chest tube placement with resolution of pneumothorax on repeat imaging  Chest tube in place without any air leak  Will consider placing to water-seal sometime today versus tomorrow  History of spontaneous left-sided pneumothorax back in 2017, in reviewing these images, he does appear to have some blebs in the bilateral apices  Will continue to follow  History of Present Illness   Physician Requesting Consult: Kelly Gray MD  Reason for Consult / Principal Problem: Pneumothorax  Hx and PE limited by: None  HPI: Navdeep Jiang is a 31y o  year old male nonsmoker with past medical history of pneumothorax in 2017 who presents with complaint of chest pain and shortness of breath that came on after sneezing at work yesterday  Symptoms have since overall resolved, does have discomfort at the chest tube site currently  He has a history of spontaneous pneumothorax on the right in 2017, had a chest tube placed that time was also treated for pneumonia  He has been doing well since then without any other recurrence of pneumothorax in between  No baseline respiratory symptoms  No recent travel, no activity with significant pressure changes such as scuba diving  He does not smoke cigarettes nor does he vape  Inpatient consult to Pulmonology  Consult performed by: José Miguel Macedo PA-C  Consult ordered by: Reese Dawson PA-C          Review of Systems   Constitutional: Negative  HENT: Negative  Respiratory: Negative  Cardiovascular: Negative  Gastrointestinal: Negative  Genitourinary: Negative  Musculoskeletal: Negative  Skin: Negative  Allergic/Immunologic: Negative  Neurological: Negative  Psychiatric/Behavioral: Negative  Historical Information   No past medical history on file  Past Surgical History:   Procedure Laterality Date    ABDOMINAL WALL SURGERY N/A 11/27/2018    Procedure: Intermediate  CLOSURE WOUND  2 cm left neck;  Surgeon: Alicia Hurt MD;  Location: [facility];  Service: General    FACIAL/NECK BIOPSY Left 11/27/2018    Procedure: NECK MASS EXCISION BIOPSY;  Surgeon: Alicia Hurt MD;  Location: [facility];  Service: General    KNEE SURGERY       Social History   Social History     Substance and Sexual Activity   Alcohol Use Yes    Alcohol/week: 2 0 standard drinks    Types: 2 Cans of beer per week    Comment: socially      Social History     Substance and Sexual Activity   Drug Use Yes    Types: Marijuana    Comment: "couple weeks ago"     E-Cigarette/Vaping     E-Cigarette/Vaping Substances     Social History     Tobacco Use   Smoking Status Former Smoker    Quit date: 2012    Years since quitting: 10 1   Smokeless Tobacco Never Used     Occupational History:     Family History:   Family History   Family history unknown: Yes       Meds/Allergies   all current active meds have been reviewed, pertinent pulmonary meds have been reviewed and current meds:   Current Facility-Administered Medications   Medication Dose Route Frequency    acetaminophen (TYLENOL) tablet 650 mg  650 mg Oral Q6H PRN    acetaminophen (TYLENOL) tablet 650 mg  650 mg Oral Q6H PRN    morphine injection 2 mg  2 mg Intravenous Q4H PRN       No Known Allergies    Objective   Vitals: Blood pressure 123/82, pulse 62, temperature 98 6 °F (37 °C), temperature source Oral, resp  rate 16, height 5' 6" (1 676 m), weight 56 7 kg (125 lb), SpO2 100 %  ,Body mass index is 20 18 kg/m²    No intake or output data in the 24 hours ending 02/10/22 1146  Invasive Devices  Report    Peripheral Intravenous Line            Peripheral IV 02/09/22 Right Antecubital 1 day    Peripheral IV 02/09/22 Left Antecubital <1 day          Drain            Chest Tube 1 Right Midaxillary 1 day                Physical Exam  Vitals reviewed  Constitutional:       General: He is not in acute distress  Appearance: Normal appearance  He is not ill-appearing  HENT:      Head: Normocephalic and atraumatic  Mouth/Throat:      Pharynx: Oropharynx is clear  Eyes:      Conjunctiva/sclera: Conjunctivae normal    Cardiovascular:      Rate and Rhythm: Normal rate and regular rhythm  Pulmonary:      Effort: Pulmonary effort is normal  No respiratory distress  Breath sounds: Normal breath sounds  No decreased breath sounds, wheezing, rhonchi or rales  Comments: Chest tube in place without air leak  Abdominal:      General: Abdomen is flat  There is no distension  Palpations: Abdomen is soft  Musculoskeletal:         General: Normal range of motion  Cervical back: Normal range of motion  Skin:     General: Skin is warm and dry  Neurological:      Mental Status: He is alert and oriented to person, place, and time  Psychiatric:         Mood and Affect: Mood normal          Behavior: Behavior normal          Lab Results:   I have personally reviewed pertinent lab results  , CBC:   Lab Results   Component Value Date    WBC 7 54 02/09/2022    HGB 15 4 02/09/2022    HCT 45 1 02/09/2022    MCV 91 02/09/2022     02/09/2022    MCH 30 9 02/09/2022    MCHC 34 1 02/09/2022    RDW 12 0 02/09/2022    MPV 9 3 02/09/2022    NRBC 0 02/09/2022   , CMP:   Lab Results   Component Value Date    SODIUM 139 02/09/2022    K 3 5 02/09/2022     02/09/2022    CO2 30 02/09/2022    BUN 14 02/09/2022    CREATININE 0 85 02/09/2022    CALCIUM 8 8 02/09/2022    EGFR 119 02/09/2022     Imaging Studies: I have personally reviewed pertinent reports     and I have personally reviewed pertinent films in PACS  EKG, Pathology, and Other Studies: I have personally reviewed pertinent reports      VTE Prophylaxis: Sequential compression device Wali Gentile)     Code Status: Level 1 - Full Code  Advance Directive and Living Will:      Power of :    POLST:

## 2022-02-10 NOTE — ED NOTES
Pt taken off suction and placed to water seal at this time per nav Wilder PA  Pt denies any c/o of pain at this time and is resting comfortably   1cc of drainage noted in drainage collection     Minh Swann RN  02/10/22 5594

## 2022-02-10 NOTE — ED NOTES
Tylenol 975mg given for complaint of right chest pain  No drainage from chest tube noted at this time       Naomi Carlton, RN  02/10/22 2040

## 2022-02-10 NOTE — ED ATTENDING ATTESTATION
2/9/2022  IRishabh MD, saw and evaluated the patient  I have discussed the patient with the resident/non-physician practitioner and agree with the resident's/non-physician practitioner's findings, Plan of Care, and MDM as documented in the resident's/non-physician practitioner's note, except where noted  All available labs and Radiology studies were reviewed  I was present for key portions of any procedure(s) performed by the resident/non-physician practitioner and I was immediately available to provide assistance  At this point I agree with the current assessment done in the Emergency Department  I have conducted an independent evaluation of this patient a history and physical is as follows:      79-year-old male status post spontaneous pneumothorax, on my exam he is resting comfortably in the bed, is not any respiratory distress  His lung sounds are decreased on the right side  His cardiovascular exam is normal     I was present for the entire procedure of chest tube placement, and assisted with placement of the tube myself  The procedure was completed without complication and the patient tolerated the procedure well  The chest tube was placed to low continuous wall suction without any evidence of chest tube leak upon completion of the procedure  Oxygen requirements are minimal   Patient is comfortable and stable for admission to the floor        ED Course         Critical Care Time  Procedures

## 2022-02-10 NOTE — H&P
[facility]  H&P- Sarwat Orona 1995, 31 y o  male MRN: [id number]  Unit/Bed#: ED 22 Encounter: [id number]  Primary Care Provider: Hilario Smith MD   Date and time admitted to hospital: 2/9/2022 10:53 PM    Spontaneous pneumothorax  Assessment & Plan  Patient presents to ED with acute chest pain and shortness of breath after seizing at the gym  History of pneumothorax 5 years ago  · Chest x-ray reveals R sided pneumothorax  · Chest tube placed in ED with improvement  · Pulmonary consult in a m  VTE Pharmacologic Prophylaxis:   Procedural bleeding risk  Code Status: Prior  Discussion with family: Patient declined call to   Anticipated Length of Stay: Patient will be admitted on an observation basis with an anticipated length of stay of less than 2 midnights secondary to Spontaneous pneumothorax  Total Time for Visit, including Counseling / Coordination of Care: 45 minutes Greater than 50% of this total time spent on direct patient counseling and coordination of care  Chief Complaint:   Chief Complaint   Patient presents with    Chest Pain     reproducible chest pain after sneezing          History of Present Illness:  Sarwat Orona is a 31 y o  male with a PMH of pneumothorax approximately 5 years ago who presented to the ED with chest pain and shortness of breath x1 day  Patient reportedly was at the gym earlier today and sneezed which produced chest pain  Pain worsened by deep breath  Denies palpitations, nausea, vomiting, cough, fever, chills  No known sick contacts  Received COVID and influenza vaccines  Review of Systems:  Review of Systems   Constitutional: Negative for chills and fever  HENT: Negative for sore throat and trouble swallowing  Respiratory: Positive for shortness of breath  Negative for cough  Cardiovascular: Positive for chest pain  Negative for palpitations and leg swelling     Gastrointestinal: Negative for abdominal pain and nausea  Genitourinary: Negative for difficulty urinating and dysuria  Skin: Negative for rash  Neurological: Negative for dizziness and light-headedness  All other systems reviewed and are negative  Past Medical and Surgical History:   No past medical history on file  Past Surgical History:   Procedure Laterality Date    ABDOMINAL WALL SURGERY N/A 11/27/2018    Procedure: Intermediate  CLOSURE WOUND  2 cm left neck;  Surgeon: Kasi Cast MD;  Location: [facility];  Service: General    FACIAL/NECK BIOPSY Left 11/27/2018    Procedure: NECK MASS EXCISION BIOPSY;  Surgeon: Kasi Cast MD;  Location: [facility];  Service: General    KNEE SURGERY         Meds/Allergies:  Prior to Admission medications    Medication Sig Start Date End Date Taking? Authorizing Provider   acetaminophen (TYLENOL) 500 mg tablet Take 1 tablet (500 mg total) by mouth every 6 (six) hours as needed for mild pain or moderate pain 11/27/18   Vidya Chavez MD   ibuprofen (MOTRIN) 800 mg tablet Take 1 tablet (800 mg total) by mouth every 8 (eight) hours as needed for mild pain or moderate pain 11/27/18   Kasi Cast MD     I have reviewed home medications with patient personally      Allergies: No Known Allergies    Social History:  Marital Status: /Civil Union   Occupation:   Patient Pre-hospital Living Situation: Home  Patient Pre-hospital Level of Mobility: walks  Patient Pre-hospital Diet Restrictions:   Substance Use History:   Social History     Substance and Sexual Activity   Alcohol Use Yes    Alcohol/week: 2 0 standard drinks    Types: 2 Cans of beer per week    Comment: socially      Social History     Tobacco Use   Smoking Status Former Smoker    Quit date: 2012    Years since quitting: 10 1   Smokeless Tobacco Never Used     Social History     Substance and Sexual Activity   Drug Use Yes    Types: Marijuana    Comment: "couple weeks ago"       Family History:  Family History   Family history unknown: Yes       Physical Exam:     Vitals:   Blood Pressure: 119/68 (02/10/22 0230)  Pulse: 74 (02/10/22 0230)  Temperature: 98 6 °F (37 °C) (02/09/22 2206)  Temp Source: Oral (02/09/22 2206)  Respirations: 17 (02/10/22 0230)  Height: 5' 6" (167 6 cm) (02/09/22 2206)  Weight - Scale: 56 7 kg (125 lb) (02/09/22 2206)  SpO2: 100 % (02/10/22 0230)    Physical Exam  Vitals and nursing note reviewed  Constitutional:       Appearance: He is well-developed  HENT:      Head: Normocephalic and atraumatic  Eyes:      Extraocular Movements: Extraocular movements intact  Conjunctiva/sclera: Conjunctivae normal    Cardiovascular:      Rate and Rhythm: Normal rate and regular rhythm  Heart sounds: No murmur heard  Pulmonary:      Effort: Pulmonary effort is normal  No respiratory distress  Breath sounds: Normal breath sounds  Abdominal:      Palpations: Abdomen is soft  Tenderness: There is no abdominal tenderness  Musculoskeletal:      Cervical back: Neck supple  Skin:     General: Skin is warm and dry  Neurological:      General: No focal deficit present  Mental Status: He is alert and oriented to person, place, and time            Additional Data:     Lab Results:  Results from last 7 days   Lab Units 02/09/22  2342   WBC Thousand/uL 7 54   HEMOGLOBIN g/dL 15 4   HEMATOCRIT % 45 1   PLATELETS Thousands/uL 193   NEUTROS PCT % 79*   LYMPHS PCT % 11*   MONOS PCT % 7   EOS PCT % 3     Results from last 7 days   Lab Units 02/09/22  2342   SODIUM mmol/L 139   POTASSIUM mmol/L 3 5   CHLORIDE mmol/L 103   CO2 mmol/L 30   BUN mg/dL 14   CREATININE mg/dL 0 85   ANION GAP mmol/L 6   CALCIUM mg/dL 8 8   GLUCOSE RANDOM mg/dL 107                       Imaging: Reviewed radiology reports from this admission including: chest xray  XR chest 2 views   ED Interpretation by Nata Goldstein PA-C (02/09 2258)      XR chest 1 view portable    (Results Pending)       EKG and Other Studies Reviewed on Admission:   · EKG: NSR    Right axis deviation    ** Please Note: This note has been constructed using a voice recognition system   **

## 2022-02-10 NOTE — ASSESSMENT & PLAN NOTE
Patient presents to ED with acute chest pain and shortness of breath after seizing at the gym  History of pneumothorax 5 years ago  · Chest x-ray reveals R sided pneumothorax  · Chest tube placed in ED with improvement  · Pulmonary consult in a m    · Tylenol, morphine prn pain

## 2022-02-10 NOTE — ED NOTES
Assumed care of pt at this time  Pt resting comfortable with no c/o of pain   0ml of drainage from chest tube hooked to suction     Alin Ron RN  02/10/22 5495

## 2022-02-10 NOTE — ED PROVIDER NOTES
History  Chief Complaint   Patient presents with    Chest Pain     reproducible chest pain after sneezing      García Vasquez is a 31 y o  male with a pertinent past medical history for spontaneous pneumothorax presenting today with chest pain status post sneezing  Patient reports that the pain is exacerbated with inspiration  He reports that his symptoms today feel similar to previous spontaneous pneumothorax  Denies any further complaints at this time  Denies any lightheadedness, dizziness, nausea, vomiting, diarrhea, fevers, chills, numbness, tingling, weakness in the extremities  No further complaints at this time            Prior to Admission Medications   Prescriptions Last Dose Informant Patient Reported? Taking?   acetaminophen (TYLENOL) 500 mg tablet   No No   Sig: Take 1 tablet (500 mg total) by mouth every 6 (six) hours as needed for mild pain or moderate pain   ibuprofen (MOTRIN) 800 mg tablet   No No   Sig: Take 1 tablet (800 mg total) by mouth every 8 (eight) hours as needed for mild pain or moderate pain      Facility-Administered Medications: None       No past medical history on file  Past Surgical History:   Procedure Laterality Date    ABDOMINAL WALL SURGERY N/A 11/27/2018    Procedure: Intermediate  CLOSURE WOUND  2 cm left neck;  Surgeon: Kasi Cast MD;  Location: [facility];  Service: General    FACIAL/NECK BIOPSY Left 11/27/2018    Procedure: NECK MASS EXCISION BIOPSY;  Surgeon: Kasi Cast MD;  Location: [facility];  Service: General    KNEE SURGERY         Family History   Family history unknown: Yes     I have reviewed and agree with the history as documented  E-Cigarette/Vaping     E-Cigarette/Vaping Substances     Social History     Tobacco Use    Smoking status: Former Smoker     Quit date: 2012     Years since quitting: 10 1    Smokeless tobacco: Never Used   Substance Use Topics    Alcohol use:  Yes     Alcohol/week: 2 0 standard drinks     Types: 2 Cans of beer per week     Comment: socially     Drug use: Yes     Types: Marijuana     Comment: "couple weeks ago"       Review of Systems   Constitutional: Negative for activity change, chills, diaphoresis and fever  HENT: Negative for congestion, ear discharge, ear pain, rhinorrhea, sore throat and trouble swallowing  Eyes: Negative for pain, discharge, redness and visual disturbance  Respiratory: Positive for chest tightness and shortness of breath  Negative for cough and wheezing  Cardiovascular: Positive for chest pain  Negative for palpitations and leg swelling  Gastrointestinal: Negative for abdominal distention, abdominal pain, blood in stool, constipation, diarrhea, nausea and vomiting  Genitourinary: Negative for difficulty urinating, dysuria, flank pain, frequency, hematuria and urgency  Musculoskeletal: Negative for arthralgias, myalgias, neck pain and neck stiffness  Skin: Negative for color change and rash  Neurological: Negative for dizziness, syncope, facial asymmetry, weakness, light-headedness, numbness and headaches  Physical Exam  Physical Exam  Vitals reviewed  Constitutional:       General: He is not in acute distress  Appearance: Normal appearance  He is not ill-appearing  HENT:      Head: Normocephalic and atraumatic  Right Ear: Tympanic membrane normal       Left Ear: Tympanic membrane normal       Nose: Nose normal  No congestion or rhinorrhea  Mouth/Throat:      Mouth: Mucous membranes are moist       Pharynx: Oropharynx is clear  No oropharyngeal exudate or posterior oropharyngeal erythema  Eyes:      Extraocular Movements: Extraocular movements intact  Conjunctiva/sclera: Conjunctivae normal       Pupils: Pupils are equal, round, and reactive to light  Cardiovascular:      Rate and Rhythm: Normal rate and regular rhythm  Pulses: Normal pulses  Heart sounds: Normal heart sounds     Pulmonary:      Effort: Pulmonary effort is normal  No respiratory distress  Breath sounds: Examination of the right-upper field reveals decreased breath sounds  Examination of the right-middle field reveals decreased breath sounds  Examination of the right-lower field reveals decreased breath sounds  Decreased breath sounds present  No wheezing  Abdominal:      General: Abdomen is flat  Bowel sounds are normal  There is no distension  Palpations: Abdomen is soft  There is no mass  Tenderness: There is no abdominal tenderness  There is no right CVA tenderness, left CVA tenderness or guarding  Hernia: No hernia is present  Musculoskeletal:         General: No swelling, tenderness or deformity  Normal range of motion  Cervical back: Normal range of motion and neck supple  No rigidity or tenderness  Right lower leg: No edema  Left lower leg: No edema  Skin:     General: Skin is warm and dry  Capillary Refill: Capillary refill takes less than 2 seconds  Coloration: Skin is not jaundiced  Findings: No erythema or rash  Neurological:      General: No focal deficit present  Mental Status: He is alert and oriented to person, place, and time  Cranial Nerves: No cranial nerve deficit  Motor: No weakness        Gait: Gait normal          Vital Signs  ED Triage Vitals   Temperature Pulse Respirations Blood Pressure SpO2   02/09/22 2206 02/09/22 2206 02/09/22 2206 02/09/22 2206 02/09/22 2206   98 6 °F (37 °C) (!) 117 18 128/70 97 %      Temp Source Heart Rate Source Patient Position - Orthostatic VS BP Location FiO2 (%)   02/09/22 2206 02/09/22 2206 02/09/22 2206 02/09/22 2206 --   Oral Monitor Sitting Left arm       Pain Score       02/09/22 2333       Med Not Given for Pain - for MAR use only           Vitals:    02/11/22 0715 02/11/22 0900 02/11/22 1556 02/11/22 2204   BP: 126/81  127/81 122/72   Pulse: 56 67 79 74   Patient Position - Orthostatic VS:    Lying         Visual Acuity  Visual Acuity      Most Recent Value   L Pupil Size (mm) 3   R Pupil Size (mm) 3          ED Medications  Medications   fentanyl citrate (PF) 100 MCG/2ML 50 mcg (50 mcg Intravenous Given 2/9/22 2333)   lidocaine (PF) (XYLOCAINE-MPF) 1 % injection 10 mL (10 mL Infiltration Given 2/9/22 2334)   sodium chloride 0 9 % bolus 1,000 mL (0 mL Intravenous Stopped 2/10/22 0054)   lidocaine (PF) (XYLOCAINE-MPF) 1 % injection 10 mL (10 mL Infiltration Given 2/10/22 0024)   acetaminophen (TYLENOL) tablet 975 mg (975 mg Oral Given 2/10/22 0455)   morphine injection 2 mg (2 mg Intravenous Given 2/10/22 0621)   morphine injection 2 mg (2 mg Intravenous Given 2/12/22 1620)       Diagnostic Studies  Results Reviewed     Procedure Component Value Units Date/Time    Basic metabolic panel [[id number]] Collected: 02/09/22 2342    Lab Status: Final result Specimen: Blood from Arm, Right Updated: 02/10/22 0002     Sodium 139 mmol/L      Potassium 3 5 mmol/L      Chloride 103 mmol/L      CO2 30 mmol/L      ANION GAP 6 mmol/L      BUN 14 mg/dL      Creatinine 0 85 mg/dL      Glucose 107 mg/dL      Calcium 8 8 mg/dL      eGFR 119 ml/min/1 73sq m     Narrative:      [place] guidelines for Chronic Kidney Disease (CKD):     Stage 1 with normal or high GFR (GFR > 90 mL/min/1 73 square meters)    Stage 2 Mild CKD (GFR = 60-89 mL/min/1 73 square meters)    Stage 3A Moderate CKD (GFR = 45-59 mL/min/1 73 square meters)    Stage 3B Moderate CKD (GFR = 30-44 mL/min/1 73 square meters)    Stage 4 Severe CKD (GFR = 15-29 mL/min/1 73 square meters)    Stage 5 End Stage CKD (GFR <15 mL/min/1 73 square meters)  Note: GFR calculation is accurate only with a steady state creatinine    CBC and differential [[id number]]  (Abnormal) Collected: 02/09/22 2342    Lab Status: Final result Specimen: Blood from Arm, Right Updated: 02/09/22 2346     WBC 7 54 Thousand/uL      RBC 4 98 Million/uL      Hemoglobin 15 4 g/dL      Hematocrit 45 1 %      MCV 91 fL MCH 30 9 pg      MCHC 34 1 g/dL      RDW 12 0 %      MPV 9 3 fL      Platelets 475 Thousands/uL      nRBC 0 /100 WBCs      Neutrophils Relative 79 %      Immat GRANS % 0 %      Lymphocytes Relative 11 %      Monocytes Relative 7 %      Eosinophils Relative 3 %      Basophils Relative 0 %      Neutrophils Absolute 6 02 Thousands/µL      Immature Grans Absolute 0 01 Thousand/uL      Lymphocytes Absolute 0 79 Thousands/µL      Monocytes Absolute 0 49 Thousand/µL      Eosinophils Absolute 0 21 Thousand/µL      Basophils Absolute 0 02 Thousands/µL                  XR chest portable   Final Result by Vira Perdomo MD (02/12 1636)      Near complete resolution of the previously noted right-sided pneumothorax   There has been no increase in the right pneumothorax since previous study performed at 1305   Trace right apical pneumothorax persists         Workstation performed: [id number]         XR chest portable   Final Result by Anand Coleman MD (02/12 1230)      Right apical chest tube with near complete resolution of tiny apical pneumothorax  Clear lungs  Workstation performed: [id number]         XR chest portable   Final Result by Anand Coleman MD (02/12 1229)      Tiny residual right apical pneumothorax with chest tube in stable position  Resolved right perihilar atelectasis  Workstation performed: [id number]         XR chest 1 view portable   Final Result by Chuck Lopez MD (02/10 7195)      Status post placement of right chest tube  Complete or near complete resolution of right-sided pneumothorax                  Workstation performed: [id number]         XR chest 2 views   Final Result by Jef Esparza MD (02/10 8252)      Moderate-sized right pneumothorax  Note: Subsequent imaging demonstrates placement of a right chest tube                    Workstation performed: [id number]                    Procedures  Chest Tube    Date/Time: 2/10/2022 1:00 AM  Performed by: Nata Goldstein PA-C  Authorized by: Nata Goldstein PA-C     Patient location:  ED  Other Assisting Provider: Yes (comment) (Dr Abebe Sutherland)    Consent:     Consent obtained:  Written    Consent given by:  Patient    Risks discussed:  Bleeding, nerve damage, pain, infection and damage to surrounding structures    Alternatives discussed:  No treatment, delayed treatment and observation  Universal protocol:     Procedure explained and questions answered to patient or proxy's satisfaction: yes      Relevant documents present and verified: yes      Test results available and properly labeled: yes      Radiology Images displayed and confirmed  If images not available, report reviewed: yes      Required blood products, implants, devices, and special equipment available: yes      Site/side marked: yes      Immediately prior to procedure a time out was called: yes      Patient identity confirmed:  Verbally with patient and arm band  Pre-procedure details:     Skin preparation:  ChloraPrep    Preparation: Patient was prepped and draped in the usual sterile fashion    Indications:     Indications: pneumothroax    Sedation:     Sedation type: Anxiolysis  Anesthesia (see MAR for exact dosages): Anesthesia method:  Local infiltration    Local anesthetic:  Lidocaine 1% w/o epi  Procedure details:     Placement location:  Lateral    Laterality:  Right    Approach:  Percutaneous    Scalpel size:  11    Access kit used: 12 Fr  Tube size (Fr):  12    Ultrasound guidance: no      Tension pneumothorax: no      Needle Decompression: no      Tube connected to:  Suction    Drainage characteristics:  Air only    Suture material:  2-0 silk    Dressing:  4x4 sterile gauze and Xeroform gauze  Post-procedure details:     Post-insertion x-ray findings: tube in good position      Patient tolerance of procedure:   Tolerated well, no immediate complications             ED Course  ED Course as of 02/16/22 0207 Thu Feb 10, 2022   0124 [place] text sent to [facility]  Internal Medicine, awaiting response for admission  5859 Discussion with Chris De Los Santos PA-C, internal medicine  [facility] will accept the patient to obs  SBIRT 20yo+      Most Recent Value   SBIRT (24 yo +)    In order to provide better care to our patients, we are screening all of our patients for alcohol and drug use  Would it be okay to ask you these screening questions? Yes Filed at: 02/09/2022 2345   Initial Alcohol Screen: US AUDIT-C     1  How often do you have a drink containing alcohol? 0 Filed at: 02/09/2022 2345   2  How many drinks containing alcohol do you have on a typical day you are drinking? 0 Filed at: 02/09/2022 2345   3a  Male UNDER 65: How often do you have five or more drinks on one occasion? 0 Filed at: 02/09/2022 2345   Audit-C Score 0 Filed at: 02/09/2022 2345   LOU: How many times in the past year have you    Used an illegal drug or used a prescription medication for non-medical reasons?  Never Filed at: 02/09/2022 2345                    MDM  Number of Diagnoses or Management Options  Pneumothorax: new and requires workup     Amount and/or Complexity of Data Reviewed  Clinical lab tests: reviewed and ordered  Tests in the radiology section of CPT®: ordered and reviewed  Tests in the medicine section of CPT®: ordered and reviewed  Discussion of test results with the performing providers: yes  Decide to obtain previous medical records or to obtain history from someone other than the patient: yes  Obtain history from someone other than the patient: yes  Review and summarize past medical records: yes  Discuss the patient with other providers: yes  Independent visualization of images, tracings, or specimens: yes    Risk of Complications, Morbidity, and/or Mortality  Presenting problems: high  Diagnostic procedures: high  Management options: high    Patient Progress  Patient progress: stable      Disposition  Final diagnoses:   Pneumothorax     Time reflects when diagnosis was documented in both MDM as applicable and the Disposition within this note     Time User Action Codes Description Comment    2/10/2022  2:14 AM Padilla Scott Add [J93 9] Pneumothorax     2/10/2022  3:22 AM Hugo Jarvis Add [D73 56] Spontaneous pneumothorax       ED Disposition     ED Disposition Condition Date/Time Comment    Admit Stable Thu Feb 10, 2022  2:14 AM Case was discussed with Abdullahi Mathur PA-C and the patient's admission status was agreed to be Admission Status: observation status to the service of Dr Raul Dexter   Follow-up Information     Follow up With Specialties Details Why [address], MD Thoracic Surgery, Cardiothoracic Surgery Follow up on 2/12/2022 Please call and schedule follow-up appointment immediately after discharged [address] [facility]  [phone]            Discharge Medication List as of 2/12/2022  5:03 PM      CONTINUE these medications which have NOT CHANGED    Details   acetaminophen (TYLENOL) 500 mg tablet Take 1 tablet (500 mg total) by mouth every 6 (six) hours as needed for mild pain or moderate pain, Starting Tue 11/27/2018, OTC      ibuprofen (MOTRIN) 800 mg tablet Take 1 tablet (800 mg total) by mouth every 8 (eight) hours as needed for mild pain or moderate pain, Starting Tue 11/27/2018, OTC             No discharge procedures on file      PDMP Review     None          ED Provider  Electronically Signed by           Nathalia Jim PA-C  02/16/22 7747

## 2022-02-11 ENCOUNTER — APPOINTMENT (OUTPATIENT)
Dept: RADIOLOGY | Facility: HOSPITAL | Age: 27
DRG: 201 | End: 2022-02-11
Payer: COMMERCIAL

## 2022-02-11 VITALS
HEIGHT: 66 IN | DIASTOLIC BLOOD PRESSURE: 72 MMHG | HEART RATE: 74 BPM | RESPIRATION RATE: 18 BRPM | SYSTOLIC BLOOD PRESSURE: 122 MMHG | TEMPERATURE: 98.2 F | BODY MASS INDEX: 20.09 KG/M2 | WEIGHT: 125 LBS | OXYGEN SATURATION: 97 %

## 2022-02-11 PROCEDURE — 99232 SBSQ HOSP IP/OBS MODERATE 35: CPT | Performed by: INTERNAL MEDICINE

## 2022-02-11 PROCEDURE — 71045 X-RAY EXAM CHEST 1 VIEW: CPT

## 2022-02-11 RX ORDER — ONDANSETRON 2 MG/ML
4 INJECTION INTRAMUSCULAR; INTRAVENOUS EVERY 6 HOURS PRN
Status: DISCONTINUED | OUTPATIENT
Start: 2022-02-11 | End: 2022-02-12 | Stop reason: HOSPADM

## 2022-02-11 RX ORDER — OXYCODONE HYDROCHLORIDE 5 MG/1
2.5 TABLET ORAL EVERY 4 HOURS PRN
Status: DISCONTINUED | OUTPATIENT
Start: 2022-02-11 | End: 2022-02-12 | Stop reason: HOSPADM

## 2022-02-11 NOTE — PROGRESS NOTES
Patient alert and oriented x3  New admission to unit  Patient oriented to the unit  Patient quiet and pleasant to staff  No complaints noted  Patient slept during shift  Chest tube site intact  No deviation in assessment finding noted during shift  Will continue to monitor

## 2022-02-11 NOTE — PROGRESS NOTES
Progress Note - Pulmonary   Sarwat Orona 31 y o  male MRN: [id number]  Unit/Bed#: -01 Encounter: [id number]    Assessment:  1  Spontaneous right pneumothorax  2  History of spontaneous left pneumothorax in 2017    Plan:  Spontaneous right-sided pneumothorax status post chest tube placement  Chest tube has been to water-seal overnight, repeat x-ray this morning without pneumothorax  Will clamp tube, repeat 1 more x-ray and plan to remove the chest tube later on today  Will continue to follow  Subjective:   Patient resting in bed  He is feeling better without any shortness of breath, chest tube still slightly uncomfortable  Objective:     Vitals: Blood pressure 126/81, pulse 67, temperature 97 7 °F (36 5 °C), resp  rate 20, height 5' 6" (1 676 m), weight 56 7 kg (125 lb), SpO2 97 %  ,Body mass index is 20 18 kg/m²  Intake/Output Summary (Last 24 hours) at 2/11/2022 1050  Last data filed at 2/11/2022 0555  Gross per 24 hour   Intake --   Output 0 ml   Net 0 ml       Invasive Devices  Report    Peripheral Intravenous Line            Peripheral IV 02/09/22 Right Antecubital 2 days    Peripheral IV 02/09/22 Left Antecubital 1 day          Drain            Chest Tube 1 Right Midaxillary 2 days                Physical Exam: /81   Pulse 67   Temp 97 7 °F (36 5 °C)   Resp 20   Ht 5' 6" (1 676 m)   Wt 56 7 kg (125 lb)   SpO2 97%   BMI 20 18 kg/m²   General appearance: alert and oriented, in no acute distress  Head: Normocephalic, without obvious abnormality, atraumatic  Eyes: negative findings: conjunctivae and sclerae normal  Lungs: clear to auscultation bilaterally and chest tube to water seal without air leak  Heart: regular rate and rhythm  Abdomen: normal findings: soft, non-tender  Extremities: No edema  Skin: Warm and dry  Neurologic: Mental status: Alert, oriented, thought content appropriate     Labs: I have personally reviewed pertinent lab results  , CBC: No results found for: WBC, HGB, HCT, MCV, PLT, ADJUSTEDWBC, MCH, MCHC, RDW, MPV, NRBC, CMP: No results found for: SODIUM, K, CL, CO2, ANIONGAP, BUN, CREATININE, GLUCOSE, CALCIUM, AST, ALT, ALKPHOS, PROT, BILITOT, EGFR  Imaging and other studies: I have personally reviewed pertinent reports     and I have personally reviewed pertinent films in PACS

## 2022-02-11 NOTE — PLAN OF CARE
Problem: Potential for Falls  Goal: Patient will remain free of falls  Description: INTERVENTIONS:  - Educate patient/family on patient safety including physical limitations  - Instruct patient to call for assistance with activity   - Consult OT/PT to assist with strengthening/mobility   - Keep Call bell within reach  - Keep bed low and locked with side rails adjusted as appropriate  - Keep care items and personal belongings within reach  - Initiate and maintain comfort rounds  - Make Fall Risk Sign visible to staff  - Offer Toileting every  Hours, in advance of need  - Initiate/Maintain alarm  - Obtain necessary fall risk management equipment:  - Apply yellow socks and bracelet for high fall risk patients  - Consider moving patient to room near nurses station  Outcome: Progressing     Problem: PAIN - ADULT  Goal: Verbalizes/displays adequate comfort level or baseline comfort level  Description: Interventions:  - Encourage patient to monitor pain and request assistance  - Assess pain using appropriate pain scale  - Administer analgesics based on type and severity of pain and evaluate response  - Implement non-pharmacological measures as appropriate and evaluate response  - Consider cultural and social influences on pain and pain management  - Notify physician/advanced practitioner if interventions unsuccessful or patient reports new pain  Outcome: Progressing     Problem: INFECTION - ADULT  Goal: Absence or prevention of progression during hospitalization  Description: INTERVENTIONS:  - Assess and monitor for signs and symptoms of infection  - Monitor lab/diagnostic results  - Monitor all insertion sites, i e  indwelling lines, tubes, and drains  - Monitor endotracheal if appropriate and nasal secretions for changes in amount and color  - [place] appropriate cooling/warming therapies per order  - Administer medications as ordered  - Instruct and encourage patient and family to use good hand hygiene technique  - Identify and instruct in appropriate isolation precautions for identified infection/condition  Outcome: Progressing  Goal: Absence of fever/infection during neutropenic period  Description: INTERVENTIONS:  - Monitor WBC    Outcome: Progressing     Problem: SAFETY ADULT  Goal: Patient will remain free of falls  Description: INTERVENTIONS:  - Educate patient/family on patient safety including physical limitations  - Instruct patient to call for assistance with activity   - Consult OT/PT to assist with strengthening/mobility   - Keep Call bell within reach  - Keep bed low and locked with side rails adjusted as appropriate  - Keep care items and personal belongings within reach  - Initiate and maintain comfort rounds  - Make Fall Risk Sign visible to staff  - Offer Toileting every  Hours, in advance of need  - Initiate/Maintain alarm  - Obtain necessary fall risk management equipment:   - Apply yellow socks and bracelet for high fall risk patients  - Consider moving patient to room near nurses station  Outcome: Progressing  Goal: Maintain or return to baseline ADL function  Description: INTERVENTIONS:  -  Assess patient's ability to carry out ADLs; assess patient's baseline for ADL function and identify physical deficits which impact ability to perform ADLs (bathing, care of mouth/teeth, toileting, grooming, dressing, etc )  - Assess/evaluate cause of self-care deficits   - Assess range of motion  - Assess patient's mobility; develop plan if impaired  - Assess patient's need for assistive devices and provide as appropriate  - Encourage maximum independence but intervene and supervise when necessary  - Involve family in performance of ADLs  - Assess for home care needs following discharge   - Consider OT consult to assist with ADL evaluation and planning for discharge  - Provide patient education as appropriate  Outcome: Progressing  Goal: Maintains/Returns to pre admission functional level  Description: INTERVENTIONS:  - Perform BMAT or MOVE assessment daily    - Set and communicate daily mobility goal to care team and patient/family/caregiver  - Collaborate with rehabilitation services on mobility goals if consulted  - Perform Range of Motion  times a day  - Reposition patient every  hours  - Dangle patient  times a day  - Stand patient  times a day  - Ambulate patient  times a day  - Out of bed to chair  times a day   - Out of bed for meals  times a day  - Out of bed for toileting  - Record patient progress and toleration of activity level   Outcome: Progressing     Problem: Knowledge Deficit  Goal: Patient/family/caregiver demonstrates understanding of disease process, treatment plan, medications, and discharge instructions  Description: Complete learning assessment and assess knowledge base    Interventions:  - Provide teaching at level of understanding  - Provide teaching via preferred learning methods  Outcome: Progressing

## 2022-02-11 NOTE — PLAN OF CARE
Problem: Potential for Falls  Goal: Patient will remain free of falls  Description: INTERVENTIONS:  - Educate patient/family on patient safety including physical limitations  - Instruct patient to call for assistance with activity   - Consult OT/PT to assist with strengthening/mobility   - Keep Call bell within reach  - Keep bed low and locked with side rails adjusted as appropriate  - Keep care items and personal belongings within reach  - Initiate and maintain comfort rounds  - Make Fall Risk Sign visible to staff  - Offer Toileting every 2 Hours, in advance of need  - Initiate/Maintain bed alarm  - Obtain necessary fall risk management equipment: call bell within reach  - Apply yellow socks and bracelet for high fall risk patients  - Consider moving patient to room near nurses station  Outcome: Progressing     Problem: PAIN - ADULT  Goal: Verbalizes/displays adequate comfort level or baseline comfort level  Description: Interventions:  - Encourage patient to monitor pain and request assistance  - Assess pain using appropriate pain scale  - Administer analgesics based on type and severity of pain and evaluate response  - Implement non-pharmacological measures as appropriate and evaluate response  - Consider cultural and social influences on pain and pain management  - Notify physician/advanced practitioner if interventions unsuccessful or patient reports new pain  Outcome: Progressing     Problem: INFECTION - ADULT  Goal: Absence or prevention of progression during hospitalization  Description: INTERVENTIONS:  - Assess and monitor for signs and symptoms of infection  - Monitor lab/diagnostic results  - Monitor all insertion sites, i e  indwelling lines, tubes, and drains  - Monitor endotracheal if appropriate and nasal secretions for changes in amount and color  - [place] appropriate cooling/warming therapies per order  - Administer medications as ordered  - Instruct and encourage patient and family to use good hand hygiene technique  - Identify and instruct in appropriate isolation precautions for identified infection/condition  Outcome: Progressing  Goal: Absence of fever/infection during neutropenic period  Description: INTERVENTIONS:  - Monitor WBC    Outcome: Progressing     Problem: SAFETY ADULT  Goal: Patient will remain free of falls  Description: INTERVENTIONS:  - Educate patient/family on patient safety including physical limitations  - Instruct patient to call for assistance with activity   - Consult OT/PT to assist with strengthening/mobility   - Keep Call bell within reach  - Keep bed low and locked with side rails adjusted as appropriate  - Keep care items and personal belongings within reach  - Initiate and maintain comfort rounds  - Make Fall Risk Sign visible to staff  - Offer Toileting every 2 Hours, in advance of need  - Initiate/Maintain bed alarm  - Obtain necessary fall risk management equipment: call bell within reach  - Apply yellow socks and bracelet for high fall risk patients  - Consider moving patient to room near nurses station  Outcome: Progressing  Goal: Maintain or return to baseline ADL function  Description: INTERVENTIONS:  -  Assess patient's ability to carry out ADLs; assess patient's baseline for ADL function and identify physical deficits which impact ability to perform ADLs (bathing, care of mouth/teeth, toileting, grooming, dressing, etc )  - Assess/evaluate cause of self-care deficits   - Assess range of motion  - Assess patient's mobility; develop plan if impaired  - Assess patient's need for assistive devices and provide as appropriate  - Encourage maximum independence but intervene and supervise when necessary  - Involve family in performance of ADLs  - Assess for home care needs following discharge   - Consider OT consult to assist with ADL evaluation and planning for discharge  - Provide patient education as appropriate  Outcome: Progressing  Goal: Maintains/Returns to pre admission functional level  Description: INTERVENTIONS:  - Perform BMAT or MOVE assessment daily    - Set and communicate daily mobility goal to care team and patient/family/caregiver  - Collaborate with rehabilitation services on mobility goals if consulted  - Perform Range of Motion 3 times a day  - Reposition patient every 2 hours  - Dangle patient 3 times a day  - Stand patient 3 times a day  - Ambulate patient 3 times a day  - Out of bed to chair 3 times a day   - Out of bed for meals 3 times a day  - Out of bed for toileting  - Record patient progress and toleration of activity level   Outcome: Progressing     Problem: Knowledge Deficit  Goal: Patient/family/caregiver demonstrates understanding of disease process, treatment plan, medications, and discharge instructions  Description: Complete learning assessment and assess knowledge base    Interventions:  - Provide teaching at level of understanding  - Provide teaching via preferred learning methods  Outcome: Progressing

## 2022-02-11 NOTE — ED NOTES
This nurse received report from day shift  Nurse went into patient's room, patient has no needs at this time  Call bell within reach       Nishant Ross RN  02/10/22 7142

## 2022-02-11 NOTE — UTILIZATION REVIEW
Initial Clinical Review    Admission: Date/Time/Statement:   Admission Orders (From admission, onward)     Ordered        02/10/22 0214  Place in Observation  Once                      Orders Placed This Encounter   Procedures    Place in Observation     Standing Status:   Standing     Number of Occurrences:   1     Order Specific Question:   Level of Care     Answer:   Med Surg [16]     ED Arrival Information     Expected Arrival Acuity    - 2/9/2022 21:59 Urgent         Means of arrival Escorted by Service Admission type    Walk-In Family Member Hospitalist Urgent         Arrival complaint    chest pain        Chief Complaint   Patient presents with    Chest Pain     reproducible chest pain after sneezing        Initial Presentation: 31year old male to the ED from home with complaints of chest pain which started after sneezing  Admitted under observation for spontaenous pneumothorax  He arrives with decreased breath sounds on right  CXR shows: right sided pneumothorax  Chest tube placed in ED  PUlmonary consult  PUlmonary consult:  REcurrent spontaneous pneumothorax  Maintain chest tube to water seal   Repeat CXR     Date: 2/11   Day 2:       ED Triage Vitals   Temperature Pulse Respirations Blood Pressure SpO2   02/09/22 2206 02/09/22 2206 02/09/22 2206 02/09/22 2206 02/09/22 2206   98 6 °F (37 °C) (!) 117 18 128/70 97 %      Temp Source Heart Rate Source Patient Position - Orthostatic VS BP Location FiO2 (%)   02/09/22 2206 02/09/22 2206 02/09/22 2206 02/09/22 2206 --   Oral Monitor Sitting Left arm       Pain Score       02/09/22 2333       Med Not Given for Pain - for MAR use only          Wt Readings from Last 1 Encounters:   02/09/22 56 7 kg (125 lb)     Additional Vital Signs:   Date/Time Temp Pulse Resp BP MAP (mmHg) SpO2 O2 Device Patient Position - Orthostatic VS   02/11/22 0715 97 7 °F (36 5 °C) 56 -- 126/81 96 96 % -- --   02/11/22 07:14:23 97 7 °F (36 5 °C) 72 -- 126/81 96 97 % -- -- 02/11/22 01:35:57 97 8 °F (36 6 °C) 69 20 129/80 96 94 % None (Room air) Lying   02/11/22 01:33:27 97 8 °F (36 6 °C) -- -- 129/80 96 -- -- --   02/11/22 0030 -- 57 20 117/75 91 97 % -- Lying   02/10/22 1330 -- 59 27 Abnormal  127/79 96 98 % None (Room air) --   02/10/22 1130 -- 61 16 123/82 98 100 % None (Room air) Lying   02/10/22 1000 -- 59 16 126/81 99 100 % None (Room air) --   02/10/22 0700 -- 70 20 117/72 90 98 % None (Room air) --   02/10/22 0630 -- 70 16 118/66 85 97 % -- --   02/10/22 0600 -- 68 14 121/68 88 99 % -- --   02/10/22 0500 -- 68 19 122/72 92 100 % None (Room air) --   02/10/22 0430 -- 67 15 122/72 91 100 % -- --   02/10/22 0400 -- 68 14 130/77 99 100 % -- --   02/10/22 0230 -- 74 17 119/68 88 100 % -- --   02/10/22 0215 -- 73 13 125/69 91 100 % -- --   02/10/22 0145 -- 80 19 117/66 86 100 % -- --   02/10/22 0130 -- 77 14 118/65 86 100 % -- --   02/10/22 0045 -- 75 16 107/66 81 100 % -- --   02/10/22 0030 -- 78 20 -- -- 100 % -- --   02/10/22 0015 -- 85 20 -- -- 100 % -- --   02/10/22 0000 -- 72 14 -- -- 100 % -- --   02/09/22 2330 -- 79 18 -- -- 99 % -- --   02/09/22 2206 98 6 °F (37 °C) 117 Abnormal  18 128/70 -- 97 % None (Room air) Sitting     Pertinent Labs/Diagnostic Test Results:  2/10 CXR:  Status post placement of right chest tube   Complete or near complete resolution of right-sided pneumothorax   2/10 CXR:   Moderate-sized right pneumothorax  Note: Subsequent imaging demonstrates placement of a right chest tube         Results from last 7 days   Lab Units 02/09/22  2342   WBC Thousand/uL 7 54   HEMOGLOBIN g/dL 15 4   HEMATOCRIT % 45 1   PLATELETS Thousands/uL 193   NEUTROS ABS Thousands/µL 6 02         Results from last 7 days   Lab Units 02/09/22  2342   SODIUM mmol/L 139   POTASSIUM mmol/L 3 5   CHLORIDE mmol/L 103   CO2 mmol/L 30   ANION GAP mmol/L 6   BUN mg/dL 14   CREATININE mg/dL 0 85   EGFR ml/min/1 73sq m 119   CALCIUM mg/dL 8 8       Results from last 7 days   Lab Units 02/09/22  2342   GLUCOSE RANDOM mg/dL 107       ED Treatment:   Medication Administration from 02/09/2022 2159 to 02/11/2022 0129       Date/Time Order Dose Route Action     02/09/2022 2333 fentanyl citrate (PF) 100 MCG/2ML 50 mcg 50 mcg Intravenous Given     02/09/2022 2334 lidocaine (PF) (XYLOCAINE-MPF) 1 % injection 10 mL 10 mL Infiltration Given     02/09/2022 2342 sodium chloride 0 9 % bolus 1,000 mL 1,000 mL Intravenous New Bag     02/10/2022 0024 lidocaine (PF) (XYLOCAINE-MPF) 1 % injection 10 mL 10 mL Infiltration Given     02/10/2022 1920 acetaminophen (TYLENOL) tablet 650 mg 650 mg Oral Given     02/10/2022 0455 acetaminophen (TYLENOL) tablet 975 mg 975 mg Oral Given     02/10/2022 4209 morphine injection 2 mg 2 mg Intravenous Given          Admitting Diagnosis: Spontaneous pneumothorax [J93 83]  Chest pain [R07 9]  Pneumothorax [J93 9]  Age/Sex: 31 y o  male  Admission Orders:  UP and OOB  SCDs    Scheduled Medications:     Continuous IV Infusions:     PRN Meds:  acetaminophen, 650 mg, Oral, Q6H PRN  acetaminophen, 650 mg, Oral, Q6H PRN  ondansetron, 4 mg, Intravenous, Q6H PRN  oxyCODONE, 2 5 mg, Oral, Q4H PRN        IP CONSULT TO PULMONOLOGY    Network Utilization Review Department  ATTENTION: Please call with any questions or concerns to [phone] and carefully listen to the prompts so that you are directed to the right person  All voicemails are confidential   Radha Rockwell all requests for admission clinical reviews, approved or denied determinations and any other requests to dedicated fax number below belonging to the campus where the patient is receiving treatment   List of dedicated fax numbers for the Facilities:  [address] DENIALS (Administrative/Medical Necessity) [phone]   [address] (Maternity/NICU/Pediatrics) [phone]   [address] [place] [address] [address] [facility] [phone]   [address] [facility] [place] [address] [address] [place] [address] Kareem Cordero [facility] [address] [place] [id number] [phone]

## 2022-02-11 NOTE — CASE MANAGEMENT
Case Management Assessment & Discharge Planning Note    Patient name Maddison Hunter  Location [address] 226/-22 MRN [id number]  : 1995 Date 2022       Current Admission Date: 2022  Current Admission Diagnosis:Spontaneous pneumothorax   Patient Active Problem List    Diagnosis Date Noted    Spontaneous pneumothorax 2017      LOS (days): 0  Geometric Mean LOS (GMLOS) (days):   Days to GMLOS:     OBJECTIVE:              Current admission status: Observation       Preferred Pharmacy:   [id number] [address]  [facility] [address]  Phone: [phone] Fax: [phone]    Primary Care Provider: Kareem Leal MD    Primary Insurance: [facility]  Secondary Insurance:     ASSESSMENT:  [facility], [address] [place] [address] Representative - Father   Primary Phone: [phone] (Mobile)               Advance Directives  Does patient have a [address]?: No  Was patient offered paperwork?: Yes (paperwork given)  Does patient currently have a Health Care decision maker?: Yes, please see Health Care Proxy section  Does patient have Advance Directives?: No  Was patient offered paperwork?: Yes (paperwork given)  Primary Contact: father -Sam Hendrix         Readmission Root Cause  30 Day Readmission: No    Patient Information  Admitted from[de-identified] Home  Mental Status: Alert  During Assessment patient was accompanied by: Not accompanied during assessment  Assessment information provided by[de-identified] Patient  Primary Caregiver: Self  Support Systems: [address] [place] [address] of Residence: [place] [address] do you live in?:  [employer] entry access options   Select all that apply : No steps to enter home  Type of Current Residence: 2 story home  Upon entering residence, is there a bedroom on the main floor (no further steps)?: No  A bedroom is located on the following floor levels of residence (select all that apply):: 2nd Floor  Upon entering residence, is there a bathroom on the main floor (no further steps)?: No  Indicate which floors of current residence have a bathroom (select all the apply):: 2nd Floor  Number of steps to 2nd floor from main floor: 5  In the last 12 months, was there a time when you were not able to pay the mortgage or rent on time?: No  In the last 12 months, how many places have you lived?: 1  In the last 12 months, was there a time when you did not have a steady place to sleep or slept in a shelter (including now)?: No  Homeless/housing insecurity resource given?: Refused  Living Arrangements: Lives w/ Spouse/significant other,Lives w/ Parent(s)  Is patient a ?: No    Activities of Daily Living Prior to Admission  Functional Status: Independent  Completes ADLs independently?: Yes  Ambulates independently?: Yes  Does patient use assisted devices?: No  Does patient currently own DME?: Yes  What DME does the patient currently own?: Genny Jerry  Does patient have a history of Outpatient Therapy (PT/OT)?: No  Does the patient have a history of Short-Term Rehab?: No  Does patient have a history of HHC?: No  Does patient currently have [facility]?: No         Patient Information Continued  Income Source: Employed  Does patient have prescription coverage?: Yes  Within the past 12 months, you worried that your food would run out before you got the money to buy more : Never true  Within the past 12 months, the food you bought just didnt last and you didnt have money to get more : Never true  Food insecurity resource given?: Refused  Does patient receive dialysis treatments?: No  Does patient have a history of substance abuse?: No  Does patient have a history of Mental Health Diagnosis?: No         Means of Transportation  Means of Transport to Appts[de-identified] Drives Self  In the past 12 months, has lack of transportation kept you from medical appointments or from getting medications?: No  In the past 12 months, has lack of transportation kept you from meetings, work, or from getting things needed for daily living?: No  Was application for public transport provided?: Refused        DISCHARGE DETAILS:    Discharge planning discussed with[de-identified] patient  Freedom of Choice: Yes  Comments - Freedom of Choice: patient is covid vaccinated without booster    patient is declining [facility] and [facility] services  CM contacted family/caregiver?: No- see comments (patient is alert and oriented and answers questions appropriately)  Were Treatment Team discharge recommendations reviewed with patient/caregiver?: Yes  Did patient/caregiver verbalize understanding of patient care needs?: Yes  Were patient/caregiver advised of the risks associated with not following Treatment Team discharge recommendations?: Yes         [address]         Is the patient interested in [facility] at discharge?: No    DME Referral Provided  Referral made for DME?: No         Would you like to participate in our [address] service program?  : No - Declined    Treatment Team Recommendation: Home  Discharge Destination Plan[de-identified] Home  Transport at Discharge : Family (wife- Qi Cervantes or mother - Bonifacio Read)

## 2022-02-11 NOTE — ASSESSMENT & PLAN NOTE
· History of recurrent pneumothorax  · No family history, non smoker   · Chest tube placed in ED for right sided pneumothorax noted on imaging  · CXR on 2/11 showing resolution, plan for repeat CXR today, follow up  · Pulmonology recommending VATS but given patient's previous spontaneous pneumothorax was on left side and this current episode is right side, would not qualify for VATS, per discussion with CT surgery

## 2022-02-12 ENCOUNTER — APPOINTMENT (INPATIENT)
Dept: RADIOLOGY | Facility: HOSPITAL | Age: 27
DRG: 201 | End: 2022-02-12
Payer: COMMERCIAL

## 2022-02-12 PROCEDURE — 99232 SBSQ HOSP IP/OBS MODERATE 35: CPT | Performed by: STUDENT IN AN ORGANIZED HEALTH CARE EDUCATION/TRAINING PROGRAM

## 2022-02-12 PROCEDURE — 71045 X-RAY EXAM CHEST 1 VIEW: CPT

## 2022-02-12 PROCEDURE — NC001 PR NO CHARGE: Performed by: NURSE PRACTITIONER

## 2022-02-12 RX ADMIN — MORPHINE SULFATE 2 MG: 2 INJECTION, SOLUTION INTRAMUSCULAR; INTRAVENOUS at 16:20

## 2022-02-12 NOTE — PROGRESS NOTES
Progress Note - Pulmonary   Juliette Ordonez 31 y o  male MRN: [id number]  Unit/Bed#: -01 Encounter: [id number]    Assessment/Plan:    1  Acute pulmonary insufficiency likely secondary to spontaneous right pneumothorax       -  currently room air 97%, patient does not wear home O2       -  continue saturations greater than 90%       -  pulmonary toilet: OOB as tolerated    2  Spontaneous right pneumothorax       -  H/O spontaneous PNTX- 2017       -  repeat chest x-ray this morning resolved right pneumothorax       -  will DC chest tube today       -  patient is at high risk for reoccurring pneumothorax       -  need close thoracic surgery follow-up       -  patient will likely need pleurodesis      - outpatient follow-up per discharge instructions  - pulmonary will sign off    Chief Complaint:    "I Feel great"    Subjective:    Alyson Lee was comfortably sitting in his bed  He reports that he would like to be discharged  He reports some pleuritic discomfort with the chest tube is  No significant overnight events reported  Patient currently denying any fevers, chills, hemoptysis, headaches, night sweats, pleuritic chest pain, or palpitations  Objective:    Vitals: Blood pressure 122/72, pulse 74, temperature 98 2 °F (36 8 °C), temperature source Oral, resp  rate 18, height 5' 6" (1 676 m), weight 56 7 kg (125 lb), SpO2 97 %  RA,Body mass index is 20 18 kg/m²  No intake or output data in the 24 hours ending 02/12/22 1552    Invasive Devices  Report    Peripheral Intravenous Line            Peripheral IV 02/09/22 Right Antecubital 3 days    Peripheral IV 02/09/22 Left Antecubital 2 days          Drain            Chest Tube 1 Right Midaxillary 3 days                Physical Exam:   Physical Exam  Constitutional:       General: He is not in acute distress  Appearance: Normal appearance  He is normal weight  He is not ill-appearing  HENT:      Head: Normocephalic and atraumatic        Nose: Nose normal  No congestion or rhinorrhea  Mouth/Throat:      Mouth: Mucous membranes are dry  Pharynx: No posterior oropharyngeal erythema  Cardiovascular:      Rate and Rhythm: Normal rate and regular rhythm  Pulses: Normal pulses  Heart sounds: Normal heart sounds  No murmur heard  No friction rub  No gallop  Pulmonary:      Effort: Pulmonary effort is normal  No tachypnea, bradypnea, accessory muscle usage or respiratory distress  Breath sounds: No stridor, decreased air movement or transmitted upper airway sounds  No decreased breath sounds, wheezing, rhonchi or rales  Comments: Clear breath sounds  Chest:      Chest wall: No tenderness  Abdominal:      General: Abdomen is flat  Bowel sounds are normal  There is no distension  Palpations: Abdomen is soft  There is no mass  Musculoskeletal:         General: No swelling or tenderness  Normal range of motion  Cervical back: Normal range of motion and neck supple  No rigidity or tenderness  Skin:     General: Skin is warm and dry  Coloration: Skin is not jaundiced or pale  Neurological:      General: No focal deficit present  Mental Status: He is alert and oriented to person, place, and time  Mental status is at baseline  Psychiatric:         Mood and Affect: Mood normal          Behavior: Behavior normal          Labs:  I have personally reviewed pertinent lab result 2/11/2022    Imaging and other studies: I have personally reviewed pertinent films in PACS     CXR- resolved right apical pneumothorax

## 2022-02-12 NOTE — DISCHARGE INSTRUCTIONS
Spontaneous Pneumothorax   WHAT YOU NEED TO KNOW:   A spontaneous pneumothorax is a collapsed lung  Part or all of the lung may collapse  Air collects in the pleural space (the space between the lungs and chest wall)  The trapped air prevents your lung from filling, and the lung collapses  A spontaneous pneumothorax can happen in one or both lungs  A primary spontaneous pneumothorax occurs in a person with no known lung problems  A secondary spontaneous pneumothorax occurs in a person who has a known lung disease or medical condition  DISCHARGE INSTRUCTIONS:   Call your local emergency number (911 in the [address] [facility]), or have someone call if:   · You have new or increased shortness of breath or chest pain  · Your throat or the front of your neck is pushed to one side  · You are sweating and feel like you are going to pass out  · Your fingernails, toenails, or lips begin to turn blue  · You have trouble thinking clearly  Call your doctor or pulmonologist if:   · You have a fever  · You hear a crackling noise or feel popping when you touch your skin  · You have questions or concerns about your condition or care  Medicines: You may need any of the following:  · Antibiotics  help prevent or treat a bacterial infection  · Prescription pain medicine  may be given  Ask your healthcare provider how to take this medicine safely  Some prescription pain medicines contain acetaminophen  Do not take other medicines that contain acetaminophen without talking to your healthcare provider  Too much acetaminophen may cause liver damage  Prescription pain medicine may cause constipation  Ask your healthcare provider how to prevent or treat constipation  · Take your medicine as directed  Contact your healthcare provider if you think your medicine is not helping or if you have side effects  Tell him of her if you are allergic to any medicine  Keep a list of the medicines, vitamins, and herbs you take  Include the amounts, and when and why you take them  Bring the list or the pill bottles to follow-up visits  Carry your medicine list with you in case of an emergency  Safety precautions:  A change of pressure could cause another pneumothorax  Follow these and other safety precautions from your healthcare provider:  · Do not  smoke  Nicotine and other chemicals in cigarettes and cigars can increase your risk for another pneumothorax  Ask your healthcare provider for information if you currently smoke and need help to quit  E-cigarettes and smokeless tobacco still contain nicotine  Talk to a healthcare provider before you use these products  · Do not  dive under water or climb to high altitudes  · Do not  fly until your provider says it is okay  · Do not  play sports until your provider says it is okay  Follow up with your doctor or pulmonologist as directed: You may need to return for more chest x-rays  Write down your questions so you remember to ask them during your visits  © Copyright [employer] 2021 Information is for End User's use only and may not be sold, redistributed or otherwise used for commercial purposes  All illustrations and images included in CareNotes® are the copyrighted property of [employer]  or [facility] Kaila Betts   The above information is an  only  It is not intended as medical advice for individual conditions or treatments  Talk to your doctor, nurse or pharmacist before following any medical regimen to see if it is safe and effective for you

## 2022-02-12 NOTE — PLAN OF CARE
Problem: INFECTION - ADULT  Goal: Absence or prevention of progression during hospitalization  Description: INTERVENTIONS:  - Assess and monitor for signs and symptoms of infection  - Monitor lab/diagnostic results  - Monitor all insertion sites, i e  indwelling lines, tubes, and drains  - Monitor endotracheal if appropriate and nasal secretions for changes in amount and color  - [place] appropriate cooling/warming therapies per order  - Administer medications as ordered  - Instruct and encourage patient and family to use good hand hygiene technique  - Identify and instruct in appropriate isolation precautions for identified infection/condition  Outcome: Progressing  Goal: Absence of fever/infection during neutropenic period  Description: INTERVENTIONS:  - Monitor WBC    Outcome: Progressing     Problem: PAIN - ADULT  Goal: Verbalizes/displays adequate comfort level or baseline comfort level  Description: Interventions:  - Encourage patient to monitor pain and request assistance  - Assess pain using appropriate pain scale  - Administer analgesics based on type and severity of pain and evaluate response  - Implement non-pharmacological measures as appropriate and evaluate response  - Consider cultural and social influences on pain and pain management  - Notify physician/advanced practitioner if interventions unsuccessful or patient reports new pain  Outcome: Progressing

## 2022-02-12 NOTE — QUICK NOTE
I Was able to remove all stitches then patient Patient was able to bear down while I removed chest tube placed,  pressure for several minutes then applied pressure dressing patient tolerated procedure

## 2022-02-12 NOTE — PLAN OF CARE
Problem: Potential for Falls  Goal: Patient will remain free of falls  Description: INTERVENTIONS:  - Educate patient/family on patient safety including physical limitations  - Instruct patient to call for assistance with activity   - Consult OT/PT to assist with strengthening/mobility   - Keep Call bell within reach  - Keep bed low and locked with side rails adjusted as appropriate  - Keep care items and personal belongings within reach  - Initiate and maintain comfort rounds  - Make Fall Risk Sign visible to staff  - Offer Toileting every  Hours, in advance of need  - Initiate/Maintain alarm  - Obtain necessary fall risk management equipment:   - Apply yellow socks and bracelet for high fall risk patients  - Consider moving patient to room near nurses station  Outcome: Progressing     Problem: PAIN - ADULT  Goal: Verbalizes/displays adequate comfort level or baseline comfort level  Description: Interventions:  - Encourage patient to monitor pain and request assistance  - Assess pain using appropriate pain scale  - Administer analgesics based on type and severity of pain and evaluate response  - Implement non-pharmacological measures as appropriate and evaluate response  - Consider cultural and social influences on pain and pain management  - Notify physician/advanced practitioner if interventions unsuccessful or patient reports new pain  Outcome: Progressing     Problem: INFECTION - ADULT  Goal: Absence or prevention of progression during hospitalization  Description: INTERVENTIONS:  - Assess and monitor for signs and symptoms of infection  - Monitor lab/diagnostic results  - Monitor all insertion sites, i e  indwelling lines, tubes, and drains  - Monitor endotracheal if appropriate and nasal secretions for changes in amount and color  - [employer] appropriate cooling/warming therapies per order  - Administer medications as ordered  - Instruct and encourage patient and family to use good hand hygiene technique  - Identify and instruct in appropriate isolation precautions for identified infection/condition  Outcome: Progressing  Goal: Absence of fever/infection during neutropenic period  Description: INTERVENTIONS:  - Monitor WBC    Outcome: Progressing     Problem: SAFETY ADULT  Goal: Patient will remain free of falls  Description: INTERVENTIONS:  - Educate patient/family on patient safety including physical limitations  - Instruct patient to call for assistance with activity   - Consult OT/PT to assist with strengthening/mobility   - Keep Call bell within reach  - Keep bed low and locked with side rails adjusted as appropriate  - Keep care items and personal belongings within reach  - Initiate and maintain comfort rounds  - Make Fall Risk Sign visible to staff  - Offer Toileting every  Hours, in advance of need  - Initiate/Maintain alarm  - Obtain necessary fall risk management equipment:   - Apply yellow socks and bracelet for high fall risk patients  - Consider moving patient to room near nurses station  Outcome: Progressing  Goal: Maintain or return to baseline ADL function  Description: INTERVENTIONS:  -  Assess patient's ability to carry out ADLs; assess patient's baseline for ADL function and identify physical deficits which impact ability to perform ADLs (bathing, care of mouth/teeth, toileting, grooming, dressing, etc )  - Assess/evaluate cause of self-care deficits   - Assess range of motion  - Assess patient's mobility; develop plan if impaired  - Assess patient's need for assistive devices and provide as appropriate  - Encourage maximum independence but intervene and supervise when necessary  - Involve family in performance of ADLs  - Assess for home care needs following discharge   - Consider OT consult to assist with ADL evaluation and planning for discharge  - Provide patient education as appropriate  Outcome: Progressing  Goal: Maintains/Returns to pre admission functional level  Description: INTERVENTIONS:  - Perform BMAT or MOVE assessment daily    - Set and communicate daily mobility goal to care team and patient/family/caregiver  - Collaborate with rehabilitation services on mobility goals if consulted  - Perform Range of Motion  times a day  - Reposition patient every  hours  - Dangle patient  times a day  - Stand patient  times a day  - Ambulate patient  times a day  - Out of bed to chair  times a day   - Out of bed for meals  times a day  - Out of bed for toileting  - Record patient progress and toleration of activity level   Outcome: Progressing     Problem: Knowledge Deficit  Goal: Patient/family/caregiver demonstrates understanding of disease process, treatment plan, medications, and discharge instructions  Description: Complete learning assessment and assess knowledge base    Interventions:  - Provide teaching at level of understanding  - Provide teaching via preferred learning methods  Outcome: Progressing     Problem: MOBILITY - ADULT  Goal: Maintain or return to baseline ADL function  Description: INTERVENTIONS:  -  Assess patient's ability to carry out ADLs; assess patient's baseline for ADL function and identify physical deficits which impact ability to perform ADLs (bathing, care of mouth/teeth, toileting, grooming, dressing, etc )  - Assess/evaluate cause of self-care deficits   - Assess range of motion  - Assess patient's mobility; develop plan if impaired  - Assess patient's need for assistive devices and provide as appropriate  - Encourage maximum independence but intervene and supervise when necessary  - Involve family in performance of ADLs  - Assess for home care needs following discharge   - Consider OT consult to assist with ADL evaluation and planning for discharge  - Provide patient education as appropriate  Outcome: Progressing  Goal: Maintains/Returns to pre admission functional level  Description: INTERVENTIONS:  - Perform BMAT or MOVE assessment daily    - Set and communicate daily mobility goal to care team and patient/family/caregiver  - Collaborate with rehabilitation services on mobility goals if consulted  - Perform Range of Motion  times a day  - Reposition patient every  hours    - Dangle patient  times a day  - Stand patient  times a day  - Ambulate patient  times a day  - Out of bed to chair  times a day   - Out of bed for xiomara times a day  - Out of bed for toileting  - Record patient progress and toleration of activity level   Outcome: Progressing

## 2022-02-12 NOTE — PROGRESS NOTES
[address] [facility]  Progress Note - Aury Lopez 1995, 31 y o  male MRN: [id number]  Unit/Bed#: MS Katrin-Angela Encounter: [id number]  Primary Care Provider: Shay Akers MD   Date and time admitted to hospital: 2022 10:53 PM    * Spontaneous pneumothorax  Assessment & Plan  · History of recurrent pneumothorax  · No family history, non smoker   · Chest tube placed in ED for right sided pneumothorax noted on imaging  · CXR on  showing resolution, plan for repeat CXR today, follow up  · Pulmonology recommending VATS but given patient's previous spontaneous pneumothorax was on left side and this current episode is right side, would not qualify for VATS, per discussion with CT surgery  VTE Pharmacologic Prophylaxis:   Low Risk (Score 0-2) - Encourage Ambulation  Patient Centered Rounds: I performed bedside rounds with nursing staff today  Discussions with Specialists or Other Care Team Provider: alonzo    Education and Discussions with Family / Patient: Patient declined call to   Time Spent for Care: 30 minutes  More than 50% of total time spent on counseling and coordination of care as described above  Current Length of Stay: 0 day(s)  Current Patient Status: Observation   Certification Statement: The patient will continue to require additional inpatient hospital stay due to pneumothorax  Discharge Plan: Anticipate discharge in 24-48 hrs to home  Code Status: Level 1 - Full Code    Subjective:   No chest pain or chest palpitations  No shortness of breath  Appetite is good  Objective:     Vitals:   Temp (24hrs), Av 1 °F (36 7 °C), Min:97 9 °F (36 6 °C), Max:98 2 °F (36 8 °C)    Temp:  [97 9 °F (36 6 °C)-98 2 °F (36 8 °C)] 98 2 °F (36 8 °C)  HR:  [74-79] 74  Resp:  [18] 18  BP: (122-127)/(72-81) 122/72  SpO2:  [95 %-97 %] 97 %  Body mass index is 20 18 kg/m²       Input and Output Summary (last 24 hours):   No intake or output data in the 24 hours ending 02/12/22 1130    Physical Exam:   Physical Exam  Vitals and nursing note reviewed  Constitutional:       Appearance: Normal appearance  HENT:      Head: Normocephalic and atraumatic  Right Ear: External ear normal       Left Ear: External ear normal       Nose: No congestion or rhinorrhea  Mouth/Throat:      Mouth: Mucous membranes are dry  Pharynx: Oropharynx is clear  Eyes:      General:         Right eye: No discharge  Left eye: No discharge  Cardiovascular:      Rate and Rhythm: Normal rate and regular rhythm  Pulmonary:      Effort: Pulmonary effort is normal  No respiratory distress  Comments: Chest tube in place, clamped  Decreased breath sounds in right lung   Abdominal:      General: Abdomen is flat  Palpations: Abdomen is soft  Musculoskeletal:      Cervical back: Normal range of motion and neck supple  Right lower leg: No edema  Left lower leg: No edema  Skin:     General: Skin is warm and dry  Neurological:      General: No focal deficit present  Mental Status: He is alert  Mental status is at baseline     Psychiatric:         Mood and Affect: Mood normal          Behavior: Behavior normal           Additional Data:     Labs:  Results from last 7 days   Lab Units 02/09/22  2342   WBC Thousand/uL 7 54   HEMOGLOBIN g/dL 15 4   HEMATOCRIT % 45 1   PLATELETS Thousands/uL 193   NEUTROS PCT % 79*   LYMPHS PCT % 11*   MONOS PCT % 7   EOS PCT % 3     Results from last 7 days   Lab Units 02/09/22  2342   SODIUM mmol/L 139   POTASSIUM mmol/L 3 5   CHLORIDE mmol/L 103   CO2 mmol/L 30   BUN mg/dL 14   CREATININE mg/dL 0 85   ANION GAP mmol/L 6   CALCIUM mg/dL 8 8   GLUCOSE RANDOM mg/dL 107                       Lines/Drains:  Invasive Devices  Report    Peripheral Intravenous Line            Peripheral IV 02/09/22 Right Antecubital 3 days    Peripheral IV 02/09/22 Left Antecubital 2 days          Drain            Chest Tube 1 Right Midaxillary 3 days                      Imaging: Reviewed radiology reports from this admission including: chest xray    Recent Cultures (last 7 days):         Last 24 Hours Medication List:   Current Facility-Administered Medications   Medication Dose Route Frequency Provider Last Rate    acetaminophen  650 mg Oral Q6H PRN Paige Curry PA-C      ondansetron  4 mg Intravenous Q6H PRN Paige Curry PA-C      oxyCODONE  2 5 mg Oral Q4H PRN Paige Curry PA-C          Today, Patient Was Seen By: Nikolai Gaytan MD    **Please Note: This note may have been constructed using a voice recognition system  **

## 2022-02-12 NOTE — UTILIZATION REVIEW
Continued Stay Review - admitted as Observation on 2/10/22 @ 0214  Converted to Inpatient on 2/12/22 @ 1434 due to spontaneous pneumothorax with chest tube placement  Admission Orders (From admission, onward)     Ordered        02/12/22 1434  Inpatient Admission  Once            02/10/22 0214  Place in Observation  Once                        Orders Placed This Encounter   Procedures    Place in Observation     Standing Status:   Standing     Number of Occurrences:   1     Order Specific Question:   Level of Care     Answer:   Med Surg [16]    Inpatient Admission     Standing Status:   Standing     Number of Occurrences:   1     Order Specific Question:   Level of Care     Answer:   Med Surg [16]     Order Specific Question:   Estimated length of stay     Answer:   More than 2 Midnights     Order Specific Question:   Certification     Answer:   I certify that inpatient services are medically necessary for this patient for a duration of greater than two midnights  See H&P and MD Progress Notes for additional information about the patient's course of treatment  Date: 2/12/22               Current Patient Class: Observation  Current Level of Care: Med Surg    HPI:26 y o  male initially admitted as Observation on 2/10/22 with spontaneous pneumothorax  Chest tube placed  2/11 Pulmonology:  Chest x-ray 02/11/2022 shows no residual pneumothorax with chest tube in right hemithorax  Spontaneous pneumothorax, recurrent  Recommend evaluation by thoracic surgery for pleuoroscopy/VATS given this patient's recurrence of pneumothorax  Okay to continue chest tube clamp trial   Recommend repeat chest x-ray in a     2/12 Internal Medicine: Chest tube in place, clamped  CXR 2/11 showing resolution, repeat CXR today  Physical exam:  Decreased breath sounds R lung  The patient will continue to require additional inpatient hospital stay due to pneumothorax  Pulmonology: No significant overnight events  Patient reports some pleuritic discomfort with chest tube  Repeat chest x-ray this morning resolved right pneumothorax  DC chest tube today  Patient is at high risk for reoccurring pneumothorax,  need close thoracic surgery follow-up,  patient will likely need pleurodesis  2/12 1801 Discharged to home/self care  Vital Signs:       Date/Time Temp Pulse Resp BP MAP (mmHg) SpO2 O2 Device   02/11/22 22:04:54 98 2 °F (36 8 °C) 74 18 122/72 89 97 % None (Room air)   02/11/22 15:56:56 97 9 °F (36 6 °C) 79 -- 127/81 96 95 % --   02/11/22 0900 -- 67 -- -- -- 97 % None (Room air)         Pertinent Labs/Diagnostic Results:     2/12 CXR:  Right-sided the pleural drainage catheter seen  Near complete resolution of the previously noted right-sided pneumothorax  There has been no increase in the right pneumothorax since previous study  Trace right apical pneumothorax persists  2/11 repeat CXR, chest tube clamped: Right apical chest tube with near complete resolution of tiny apical pneumothorax    Clear lungs      2/11 CXR:   Tiny residual right apical pneumothorax with chest tube in stable position  Resolved right perihilar atelectasis  Results from last 7 days   Lab Units 02/09/22  2342   WBC Thousand/uL 7 54   HEMOGLOBIN g/dL 15 4   HEMATOCRIT % 45 1   PLATELETS Thousands/uL 193   NEUTROS ABS Thousands/µL 6 02         Results from last 7 days   Lab Units 02/09/22  2342   SODIUM mmol/L 139   POTASSIUM mmol/L 3 5   CHLORIDE mmol/L 103   CO2 mmol/L 30   ANION GAP mmol/L 6   BUN mg/dL 14   CREATININE mg/dL 0 85   EGFR ml/min/1 73sq m 119   CALCIUM mg/dL 8 8             Results from last 7 days   Lab Units 02/09/22  2342   GLUCOSE RANDOM mg/dL 107             Medications:     Scheduled Medications: None  Continuous IV Infusions: None       PRN Meds:  acetaminophen, 650 mg, Oral, Q6H PRN  ondansetron, 4 mg, Intravenous, Q6H PRN  oxyCODONE, 2 5 mg, Oral, Q4H PRN        Discharge Plan: TBD        Network Utilization Review Department  ATTENTION: Please call with any questions or concerns to [phone] and carefully listen to the prompts so that you are directed to the right person  All voicemails are confidential   Jihan Jaime all requests for admission clinical reviews, approved or denied determinations and any other requests to dedicated fax number below belonging to the campus where the patient is receiving treatment   List of dedicated fax numbers for the Facilities:  [address] DENIALS (Administrative/Medical Necessity) [phone]   [address] (Maternity/NICU/Pediatrics) [phone]   [address]  [address] [address] [place] [address] [address] [facility] [place] [address] [phone]

## 2022-02-23 NOTE — DISCHARGE SUMMARY
[address]  Discharge- Susana Che 1995, 31 y o  male MRN: [id number]  Unit/Bed#: -01 Encounter: [id number]  Primary Care Provider: Kermit Hazel MD   Date and time admitted to hospital: 2/9/2022 10:53 PM    * Spontaneous pneumothorax  Assessment & Plan  · History of recurrent pneumothorax  · No family history, non smoker   · Chest tube placed in ED for right sided pneumothorax noted on imaging  · CXR on 2/11 showing resolution, plan for repeat CXR today, follow up  · Pulmonology recommending VATS but given patient's previous spontaneous pneumothorax was on left side and this current episode is right side, would not qualify for VATS, per discussion with CT surgery  · Repeat imaging shows improvement in pneumothorax  Stable for chest tube removal and discharge          Medical Problems             Resolved Problems  Date Reviewed: 2/11/2022    None              Discharging Physician / Practitioner: Clementine Sarah MD  PCP: Kermit Hazel MD  Admission Date:   Admission Orders (From admission, onward)     Ordered        02/12/22 1434  Inpatient Admission  Once            02/10/22 0214  Place in Observation  Once                      Discharge Date: 2/12/22      Consultations During Hospital Stay:  · IP CONSULT TO PULMONOLOGY  ·     Procedures Performed:   · imaging    Significant Findings / Test Results:   Principal Problem:    Spontaneous pneumothorax  Resolved Problems:    * No resolved hospital problems  *  ·   ·     Incidental Findings:   · See above      Test Results Pending at Discharge (will require follow up): · Na      Outpatient Tests Requested:  · Follow up with PCP     Complications:  na    Reason for Admission: chest pain     Hospital Course:   Susana Che is a 31 y o  male patient who originally presented to the hospital on 2/9/2022 due to chest pain, secondary to spontaneous pneumothorax requiring chest tube placement   Serial imaging showed resolution of pneumothorax so chest tube was removed and patient was stable for discharge  Condition at Discharge: good    Discharge Day Visit / Exam:   * Please refer to separate progress note for these details *    Discussion with Family: Updated  (mother) at bedside  Discharge instructions/Information to patient and family:   See after visit summary for information provided to patient and family  Provisions for Follow-Up Care:  See after visit summary for information related to follow-up care and any pertinent home health orders  Disposition:   Home    Planned Readmission: none      Discharge Statement:  I spent 30+ minutes discharging the patient  This time was spent on the day of discharge  I had direct contact with the patient on the day of discharge  Greater than 50% of the total time was spent examining patient, answering all patient questions, arranging and discussing plan of care with patient as well as directly providing post-discharge instructions  Additional time then spent on discharge activities  Discharge Medications:  See after visit summary for reconciled discharge medications provided to patient and/or family        **Please Note: This note may have been constructed using a voice recognition system**

## 2022-02-23 NOTE — ASSESSMENT & PLAN NOTE
· History of recurrent pneumothorax  · No family history, non smoker   · Chest tube placed in ED for right sided pneumothorax noted on imaging  · CXR on 2/11 showing resolution, plan for repeat CXR today, follow up  · Pulmonology recommending VATS but given patient's previous spontaneous pneumothorax was on left side and this current episode is right side, would not qualify for VATS, per discussion with CT surgery  · Repeat imaging shows improvement in pneumothorax   Stable for chest tube removal and discharge

## 2024-05-06 ENCOUNTER — APPOINTMENT (OUTPATIENT)
Age: 29
End: 2024-05-06
Payer: COMMERCIAL

## 2024-05-06 ENCOUNTER — OFFICE VISIT (OUTPATIENT)
Age: 29
End: 2024-05-06
Payer: COMMERCIAL

## 2024-05-06 VITALS
WEIGHT: 131 LBS | BODY MASS INDEX: 21.05 KG/M2 | DIASTOLIC BLOOD PRESSURE: 73 MMHG | HEART RATE: 84 BPM | OXYGEN SATURATION: 98 % | RESPIRATION RATE: 16 BRPM | SYSTOLIC BLOOD PRESSURE: 120 MMHG | HEIGHT: 66 IN

## 2024-05-06 DIAGNOSIS — Z87.09 HISTORY OF PNEUMOTHORAX: Primary | ICD-10-CM

## 2024-05-06 DIAGNOSIS — R07.89 CHEST TIGHTNESS: ICD-10-CM

## 2024-05-06 DIAGNOSIS — Z87.09 HISTORY OF PNEUMOTHORAX: ICD-10-CM

## 2024-05-06 PROCEDURE — 71046 X-RAY EXAM CHEST 2 VIEWS: CPT

## 2024-05-06 PROCEDURE — 99213 OFFICE O/P EST LOW 20 MIN: CPT

## 2024-05-06 NOTE — ASSESSMENT & PLAN NOTE
-History of spontaneous left pneumothorax in 2017 and again on the right in 2022 requiring chest tube placement and hospitalization  -No risk factors for spontaneous pneumothorax.  No family history.  He is a non-smoker.  Normal alpha-1 antitrypsin testing previously  -CT surgery evaluated during his previous admission in 2022, not a candidate for VATS given pneumothorax occurred in different lungs

## 2024-05-06 NOTE — PROGRESS NOTES
Pulmonary Follow Up Note  Jaswant Saunders 28 y.o. male MRN: [id number]  5/6/2024    Assessment/Plan:    History of pneumothorax  -History of spontaneous left pneumothorax in 2017 and again on the right in 2022 requiring chest tube placement  -No risk factors for spontaneous pneumothorax.  No family history.  He is a non-smoker.  Normal alpha-1 antitrypsin testing previously  -CT surgery evaluated during his previous admission, not a candidate for VATS given pneumothorax occurred in different lungs      Chest tightness  -Patient experienced left-sided chest tightness 1 week ago lasting 2 days.  No increase in shortness of breath.  He is concerned given his history.  Lung sounds are clear on exam today.  SpO2 98% on room air.   -I placed order for chest x-ray to be completed now, if abnormal, will send to ED.  If normal, patient will continue to monitor symptoms and return to pulmonary if needed.  Also reviewed ED precautions    **Addendum  -CXR normal, no evidence of pneumothorax. Notified patient of results    Diagnoses and all orders for this visit:    History of pneumothorax  -     XR chest pa & lateral; Future    Chest tightness  -     XR chest pa & lateral; Future      Return if symptoms worsen or fail to improve.    History of Present Illness     Chief Complaint:   Chief Complaint   Patient presents with    Follow-up       Patient ID: Jaswant is a 28 y.o. y.o. male has no past medical history on file.      5/6/2024  HPI: Jaswant Saunders is a very pleasant 28 y.o. male non-smoker with a PMH of spontaneous pneumothorax on the left in 2017 and again on the right in 2022 requiring hospitalization and chest tube placement.  He has no known risk factors for spontaneous pneumothorax, no family history.  He had alpha-1 antitrypsin testing that was normal.  He has no other pulmonary history such as asthma or frequent respiratory infections.  He has had no recurrences over the past 2 years since 2022.    Patient returns today,  concerned about left-sided chest tightness lasting 2 days that occurred last week.  It is now resolved.  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  No recent viral illnesses or fevers. No recent travel, heavy lifting/exercise, or trauma.  Chest tightness was a little worse with deep breathing.  Patient denies any shortness of breath.     Review of Systems   Constitutional:  Negative for activity change, chills, fever and unexpected weight change.   HENT:  Negative for congestion, postnasal drip, rhinorrhea, sore throat and trouble swallowing.    Respiratory:  Positive for chest tightness. Negative for cough, shortness of breath and wheezing.    Cardiovascular:  Negative for chest pain, palpitations and leg swelling.   Allergic/Immunologic: Negative.        Historical Information   History reviewed. No pertinent past medical history.  Past Surgical History:   Procedure Laterality Date    ABDOMINAL WALL SURGERY N/A 11/27/2018    Procedure: Intermediate  CLOSURE WOUND  2 cm left neck;  Surgeon: Berto Chavez MD;  Location: [facility];  Service: General    FACIAL/NECK BIOPSY Left 11/27/2018    Procedure: NECK MASS EXCISION BIOPSY;  Surgeon: Berto Chavez MD;  Location: [facility];  Service: General    KNEE SURGERY       Family History   Family history unknown: Yes       Smoking history: He reports that he quit smoking about 12 years ago. He has never used smokeless tobacco.    Occupational History:     Immunization History   Administered Date(s) Administered    COVID-19 J&J (Cinnamon) vaccine 0.5 mL 04/29/2021, 04/05/2022    Tdap 12/31/2016       Meds/Allergies     Current Outpatient Medications:     acetaminophen (TYLENOL) 500 mg tablet, Take 1 tablet (500 mg total) by mouth every 6 (six) hours as needed for mild pain or moderate pain (Patient not taking: Reported on 5/6/2024), Disp: 30 tablet, Rfl: 0    ibuprofen (MOTRIN) 800 mg tablet, Take 1  "tablet (800 mg total) by mouth every 8 (eight) hours as needed for mild pain or moderate pain (Patient not taking: Reported on 5/6/2024), Disp: 30 tablet, Rfl: 0    Allergies: No Known Allergies      Vitals:  Vitals:    05/06/24 0939 05/06/24 0941   BP: 120/73    BP Location: Right arm    Patient Position: Sitting    Cuff Size: Large    Pulse: 84    Resp: 16    SpO2: 98% 98%   Weight: 59.4 kg (131 lb)    Height: 5' 6\" (1.676 m)    Oxygen Therapy  SpO2: 98 %  Oxygen Therapy: None (Room air)  .  Wt Readings from Last 3 Encounters:   05/06/24 59.4 kg (131 lb)   02/09/22 56.7 kg (125 lb)   12/10/18 63 kg (139 lb)     Body mass index is 21.14 kg/m².    Physical Exam  Vitals and nursing note reviewed.   Constitutional:       General: He is not in acute distress.     Appearance: Normal appearance. He is well-developed.   Cardiovascular:      Rate and Rhythm: Normal rate and regular rhythm.      Heart sounds: Normal heart sounds. No murmur heard.  Pulmonary:      Effort: Pulmonary effort is normal. No respiratory distress.      Breath sounds: Normal breath sounds. No decreased breath sounds, wheezing, rhonchi or rales.   Musculoskeletal:         General: No swelling.      Right lower leg: No edema.      Left lower leg: No edema.   Psychiatric:         Mood and Affect: Mood and affect normal.         Behavior: Behavior normal. Behavior is cooperative.           Labs: I have personally reviewed pertinent lab results.  Lab Results   Component Value Date    WBC 7.54 02/09/2022    HGB 15.4 02/09/2022    HCT 45.1 02/09/2022    MCV 91 02/09/2022     02/09/2022     Lab Results   Component Value Date    CALCIUM 8.8 02/09/2022    K 3.5 02/09/2022    CO2 30 02/09/2022     02/09/2022    BUN 14 02/09/2022    CREATININE 0.85 02/09/2022     No results found for: \"IGE\"  No results found for: \"ALT\", \"AST\", \"GGT\", \"ALKPHOS\", \"BILITOT\"    Studies:    Imaging and other studies: I have personally reviewed pertinent reports and I " have personally reviewed pertinent films in PACS

## 2024-05-06 NOTE — ASSESSMENT & PLAN NOTE
-Patient experienced left-sided chest tightness 1 week ago lasting 2 days.  No increase in shortness of breath.  He is concerned given his history.  Lung sounds are clear on exam today.  SpO2 98% on room air.   -I placed order for chest x-ray to be completed now, if abnormal, will send to ED.  If normal, patient will continue to monitor symptoms and return to pulmonary if needed.  Also reviewed ED precautions

## 2024-10-28 ENCOUNTER — APPOINTMENT (EMERGENCY)
Dept: RADIOLOGY | Facility: HOSPITAL | Age: 29
DRG: 143 | End: 2024-10-28
Payer: COMMERCIAL

## 2024-10-28 ENCOUNTER — HOSPITAL ENCOUNTER (INPATIENT)
Facility: HOSPITAL | Age: 29
LOS: 2 days | Discharge: HOME | DRG: 143 | End: 2024-10-30
Attending: STUDENT IN AN ORGANIZED HEALTH CARE EDUCATION/TRAINING PROGRAM | Admitting: STUDENT IN AN ORGANIZED HEALTH CARE EDUCATION/TRAINING PROGRAM
Payer: COMMERCIAL

## 2024-10-28 DIAGNOSIS — Z87.09 HISTORY OF PNEUMOTHORAX: ICD-10-CM

## 2024-10-28 DIAGNOSIS — T14.8XXA SKIN WOUND FROM SURGICAL INCISION: ICD-10-CM

## 2024-10-28 DIAGNOSIS — J93.9 PNEUMOTHORAX ON RIGHT: Primary | ICD-10-CM

## 2024-10-28 DIAGNOSIS — J93.83 RECURRENT SPONTANEOUS PNEUMOTHORAX: ICD-10-CM

## 2024-10-28 LAB
ANION GAP SERPL CALCULATED.3IONS-SCNC: 6 MMOL/L (ref 4–13)
APTT PPP: 28 SECONDS (ref 23–34)
ATRIAL RATE: 60 BPM
BASOPHILS # BLD AUTO: 0.02 THOUSANDS/ΜL (ref 0–0.1)
BASOPHILS NFR BLD AUTO: 0 % (ref 0–1)
BUN SERPL-MCNC: 21 MG/DL (ref 5–25)
CALCIUM SERPL-MCNC: 9.4 MG/DL (ref 8.4–10.2)
CHLORIDE SERPL-SCNC: 103 MMOL/L (ref 96–108)
CO2 SERPL-SCNC: 30 MMOL/L (ref 21–32)
CREAT SERPL-MCNC: 0.85 MG/DL (ref 0.6–1.3)
EOSINOPHIL # BLD AUTO: 0.14 THOUSAND/ΜL (ref 0–0.61)
EOSINOPHIL NFR BLD AUTO: 2 % (ref 0–6)
ERYTHROCYTE [DISTWIDTH] IN BLOOD BY AUTOMATED COUNT: 11.7 % (ref 11.6–15.1)
FLUAV AG UPPER RESP QL IA.RAPID: NEGATIVE
FLUBV AG UPPER RESP QL IA.RAPID: NEGATIVE
GFR SERPL CREATININE-BSD FRML MDRD: 116 ML/MIN/1.73SQ M
GLUCOSE SERPL-MCNC: 100 MG/DL (ref 65–140)
HCT VFR BLD AUTO: 51.4 % (ref 36.5–49.3)
HGB BLD-MCNC: 16.8 G/DL (ref 12–17)
IMM GRANULOCYTES # BLD AUTO: 0.01 THOUSAND/UL (ref 0–0.2)
IMM GRANULOCYTES NFR BLD AUTO: 0 % (ref 0–2)
INR PPP: 0.88 (ref 0.85–1.19)
LYMPHOCYTES # BLD AUTO: 2.1 THOUSANDS/ΜL (ref 0.6–4.47)
LYMPHOCYTES NFR BLD AUTO: 36 % (ref 14–44)
MCH RBC QN AUTO: 30.3 PG (ref 26.8–34.3)
MCHC RBC AUTO-ENTMCNC: 32.7 G/DL (ref 31.4–37.4)
MCV RBC AUTO: 93 FL (ref 82–98)
MONOCYTES # BLD AUTO: 0.4 THOUSAND/ΜL (ref 0.17–1.22)
MONOCYTES NFR BLD AUTO: 7 % (ref 4–12)
NEUTROPHILS # BLD AUTO: 3.15 THOUSANDS/ΜL (ref 1.85–7.62)
NEUTS SEG NFR BLD AUTO: 55 % (ref 43–75)
NRBC BLD AUTO-RTO: 0 /100 WBCS
P AXIS: 86 DEGREES
PLATELET # BLD AUTO: 186 THOUSANDS/UL (ref 149–390)
PMV BLD AUTO: 9.8 FL (ref 8.9–12.7)
POTASSIUM SERPL-SCNC: 3.8 MMOL/L (ref 3.5–5.3)
PR INTERVAL: 122 MS
PROTHROMBIN TIME: 12.6 SECONDS (ref 12.3–15)
QRS AXIS: 92 DEGREES
QRSD INTERVAL: 86 MS
QT INTERVAL: 386 MS
QTC INTERVAL: 386 MS
RBC # BLD AUTO: 5.55 MILLION/UL (ref 3.88–5.62)
SARS-COV+SARS-COV-2 AG RESP QL IA.RAPID: NEGATIVE
SODIUM SERPL-SCNC: 139 MMOL/L (ref 135–147)
T WAVE AXIS: 84 DEGREES
VENTRICULAR RATE: 60 BPM
WBC # BLD AUTO: 5.82 THOUSAND/UL (ref 4.31–10.16)

## 2024-10-28 PROCEDURE — 71045 X-RAY EXAM CHEST 1 VIEW: CPT

## 2024-10-28 PROCEDURE — 85025 COMPLETE CBC W/AUTO DIFF WBC: CPT | Performed by: PHYSICIAN ASSISTANT

## 2024-10-28 PROCEDURE — 96374 THER/PROPH/DIAG INJ IV PUSH: CPT

## 2024-10-28 PROCEDURE — 0W9930Z DRAINAGE OF RIGHT PLEURAL CAVITY WITH DRAINAGE DEVICE, PERCUTANEOUS APPROACH: ICD-10-PCS

## 2024-10-28 PROCEDURE — 96361 HYDRATE IV INFUSION ADD-ON: CPT

## 2024-10-28 PROCEDURE — 87804 INFLUENZA ASSAY W/OPTIC: CPT | Performed by: PHYSICIAN ASSISTANT

## 2024-10-28 PROCEDURE — 36415 COLL VENOUS BLD VENIPUNCTURE: CPT | Performed by: PHYSICIAN ASSISTANT

## 2024-10-28 PROCEDURE — 32551 INSERTION OF CHEST TUBE: CPT

## 2024-10-28 PROCEDURE — 96375 TX/PRO/DX INJ NEW DRUG ADDON: CPT

## 2024-10-28 PROCEDURE — 71046 X-RAY EXAM CHEST 2 VIEWS: CPT

## 2024-10-28 PROCEDURE — 99223 1ST HOSP IP/OBS HIGH 75: CPT | Performed by: STUDENT IN AN ORGANIZED HEALTH CARE EDUCATION/TRAINING PROGRAM

## 2024-10-28 PROCEDURE — 99285 EMERGENCY DEPT VISIT HI MDM: CPT

## 2024-10-28 PROCEDURE — 99291 CRITICAL CARE FIRST HOUR: CPT | Performed by: PHYSICIAN ASSISTANT

## 2024-10-28 PROCEDURE — 85730 THROMBOPLASTIN TIME PARTIAL: CPT | Performed by: PHYSICIAN ASSISTANT

## 2024-10-28 PROCEDURE — 93010 ELECTROCARDIOGRAM REPORT: CPT | Performed by: INTERNAL MEDICINE

## 2024-10-28 PROCEDURE — 99223 1ST HOSP IP/OBS HIGH 75: CPT | Performed by: INTERNAL MEDICINE

## 2024-10-28 PROCEDURE — 87811 SARS-COV-2 COVID19 W/OPTIC: CPT | Performed by: PHYSICIAN ASSISTANT

## 2024-10-28 PROCEDURE — 80048 BASIC METABOLIC PNL TOTAL CA: CPT | Performed by: PHYSICIAN ASSISTANT

## 2024-10-28 PROCEDURE — 85610 PROTHROMBIN TIME: CPT | Performed by: PHYSICIAN ASSISTANT

## 2024-10-28 PROCEDURE — 93005 ELECTROCARDIOGRAM TRACING: CPT

## 2024-10-28 RX ORDER — HEPARIN SODIUM 5000 [USP'U]/ML
5000 INJECTION, SOLUTION INTRAVENOUS; SUBCUTANEOUS EVERY 8 HOURS SCHEDULED
Status: DISCONTINUED | OUTPATIENT
Start: 2024-10-28 | End: 2024-10-30 | Stop reason: HOSPADM

## 2024-10-28 RX ORDER — FENTANYL CITRATE 50 UG/ML
50 INJECTION, SOLUTION INTRAMUSCULAR; INTRAVENOUS ONCE
Status: COMPLETED | OUTPATIENT
Start: 2024-10-28 | End: 2024-10-28

## 2024-10-28 RX ORDER — LIDOCAINE HYDROCHLORIDE AND EPINEPHRINE 10; 10 MG/ML; UG/ML
20 INJECTION, SOLUTION INFILTRATION; PERINEURAL ONCE
Status: COMPLETED | OUTPATIENT
Start: 2024-10-28 | End: 2024-10-28

## 2024-10-28 RX ORDER — MIDAZOLAM HYDROCHLORIDE 2 MG/2ML
1 INJECTION, SOLUTION INTRAMUSCULAR; INTRAVENOUS ONCE
Status: COMPLETED | OUTPATIENT
Start: 2024-10-28 | End: 2024-10-28

## 2024-10-28 RX ORDER — OXYCODONE HYDROCHLORIDE 5 MG/1
5 TABLET ORAL EVERY 4 HOURS PRN
Status: DISCONTINUED | OUTPATIENT
Start: 2024-10-28 | End: 2024-10-30 | Stop reason: HOSPADM

## 2024-10-28 RX ORDER — TRAMADOL HYDROCHLORIDE 50 MG/1
50 TABLET ORAL EVERY 6 HOURS PRN
Status: DISCONTINUED | OUTPATIENT
Start: 2024-10-28 | End: 2024-10-28

## 2024-10-28 RX ORDER — HYDROMORPHONE HCL IN WATER/PF 6 MG/30 ML
0.2 PATIENT CONTROLLED ANALGESIA SYRINGE INTRAVENOUS EVERY 2 HOUR PRN
Status: DISCONTINUED | OUTPATIENT
Start: 2024-10-28 | End: 2024-10-30 | Stop reason: HOSPADM

## 2024-10-28 RX ORDER — KETOROLAC TROMETHAMINE 30 MG/ML
15 INJECTION, SOLUTION INTRAMUSCULAR; INTRAVENOUS EVERY 6 HOURS PRN
Status: DISCONTINUED | OUTPATIENT
Start: 2024-10-28 | End: 2024-10-30 | Stop reason: HOSPADM

## 2024-10-28 RX ADMIN — FENTANYL CITRATE 50 MCG: 50 INJECTION INTRAMUSCULAR; INTRAVENOUS at 07:51

## 2024-10-28 RX ADMIN — KETOROLAC TROMETHAMINE 15 MG: 30 INJECTION, SOLUTION INTRAMUSCULAR; INTRAVENOUS at 13:44

## 2024-10-28 RX ADMIN — TRAMADOL HYDROCHLORIDE 50 MG: 50 TABLET ORAL at 18:52

## 2024-10-28 RX ADMIN — LIDOCAINE HYDROCHLORIDE,EPINEPHRINE BITARTRATE 20 ML: 10; .01 INJECTION, SOLUTION INFILTRATION; PERINEURAL at 09:23

## 2024-10-28 RX ADMIN — SODIUM CHLORIDE 1000 ML: 0.9 INJECTION, SOLUTION INTRAVENOUS at 07:51

## 2024-10-28 RX ADMIN — MIDAZOLAM 1 MG: 1 INJECTION INTRAMUSCULAR; INTRAVENOUS at 07:51

## 2024-10-28 RX ADMIN — LIDOCAINE HYDROCHLORIDE,EPINEPHRINE BITARTRATE 20 ML: 10; .01 INJECTION, SOLUTION INFILTRATION; PERINEURAL at 08:26

## 2024-10-28 NOTE — H&P
H&P - Hospitalist   Name: Jaswant Saunders 29 y.o. male I MRN: [id number]  Unit/Bed#: -01 I Date of Admission: 10/28/2024   Date of Service: 10/28/2024 I Hospital Day: 0     Assessment & Plan  Recurrent spontaneous pneumothorax  Patient's third episode since 2017, twice on the right once on the left  No family history, no history of cystic fibrosis  Chest tube in place  Pulmonary consulted  Should be a candidate for thoracic surgery evaluation    VTE Pharmacologic Prophylaxis: VTE Score: 3 Moderate Risk (Score 3-4) - Pharmacological DVT Prophylaxis Ordered: heparin.  Code Status: Level 1 - Full Code   Discussion with family: Updated  (significant other) at bedside.    Anticipated Length of Stay: Patient will be admitted on an inpatient basis with an anticipated length of stay of greater than 2 midnights secondary to chest tube in place.    History of Present Illness   Chief Complaint: Chest/back pain, shortness of breath      Jaswant Saunders is a 29 y.o. male with a PMH of spontaneous pneumothorax who presents with Patient states he was sleeping and around 7 AM this morning he was awoken with a stabbing severe pain in his back.  He immediately became short of breath and felt this was similar to prior episodes when he had a spontaneous pneumothorax so was rushed to the hospital.  No other precipitating factors or changes in his routine that he noted.    This is the patient's third spontaneous pneumothorax since 2017.    In the ED vitals are within normal limits except for respiratory rate 24.  Labs unremarkable.  Initial chest x-ray showed large right pneumothorax.  In the ED was treated with fentanyl, Versed, 1 L NS bolus and chest tube placement.      Review of Systems   Constitutional: Negative.    HENT: Negative.     Eyes: Negative.    Respiratory:  Positive for chest tightness and shortness of breath.    Cardiovascular:  Positive for chest pain.   Gastrointestinal: Negative.    Genitourinary: Negative.  "   Musculoskeletal: Negative.    Neurological: Negative.        Historical Information   History reviewed. No pertinent past medical history.  Past Surgical History:   Procedure Laterality Date    ABDOMINAL WALL SURGERY N/A 2018    Procedure: Intermediate  CLOSURE WOUND  2 cm left neck;  Surgeon: Berto Chavez MD;  Location: [facility];  Service: General    FACIAL/NECK BIOPSY Left 2018    Procedure: NECK MASS EXCISION BIOPSY;  Surgeon: Berto Chavez MD;  Location: [facility];  Service: General    KNEE SURGERY       Social History     Tobacco Use    Smoking status: Former     Current packs/day: 0.00     Types: Cigarettes     Quit date:      Years since quittin.8    Smokeless tobacco: Never   Vaping Use    Vaping status: Never Used   Substance and Sexual Activity    Alcohol use: Yes     Alcohol/week: 2.0 standard drinks of alcohol     Types: 2 Cans of beer per week     Comment: socially     Drug use: Yes     Types: Marijuana     Comment: \"couple weeks ago\"    Sexual activity: Never     E-Cigarette/Vaping    E-Cigarette Use Never User      E-Cigarette/Vaping Substances     Social History:  Marital Status: /Civil Union     Meds/Allergies   I have reviewed home medications with patient personally.  Prior to Admission medications    Medication Sig Start Date End Date Taking? Authorizing Provider   acetaminophen (TYLENOL) 500 mg tablet Take 1 tablet (500 mg total) by mouth every 6 (six) hours as needed for mild pain or moderate pain  Patient not taking: Reported on 18   Berto Chavez MD   ibuprofen (MOTRIN) 800 mg tablet Take 1 tablet (800 mg total) by mouth every 8 (eight) hours as needed for mild pain or moderate pain  Patient not taking: Reported on 18   Berto Chavez MD     No Known Allergies    Objective :  Temp:  [97.6 °F (36.4 °C)-97.8 °F (36.6 °C)] 97.8 °F (36.6 °C)  HR:  [71-90] 73  BP: (114-156)/(62-89) 114/74  Resp:  [13-24] 19  SpO2:  [96 %-100 " "%] 100 %  O2 Device: Nasal cannula  Nasal Cannula O2 Flow Rate (L/min):  [2 L/min] 2 L/min    Physical Exam  Constitutional:       Appearance: Normal appearance.   HENT:      Head: Normocephalic and atraumatic.   Cardiovascular:      Rate and Rhythm: Normal rate and regular rhythm.   Pulmonary:      Breath sounds: Normal breath sounds.      Comments: Left posterior chest tube in place  Diminished inspiratory effort secondary to pain  Abdominal:      General: There is no distension.      Palpations: Abdomen is soft.      Tenderness: There is no abdominal tenderness.   Musculoskeletal:         General: No swelling.   Skin:     General: Skin is warm and dry.   Neurological:      Mental Status: He is alert.          Lines/Drains:  Lines/Drains/Airways       Active Status       Name Placement date Placement time Site Days    Chest Tube Right 10/28/24  0946  --  less than 1                          Lab Results: I have reviewed the following results:  Results from last 7 days   Lab Units 10/28/24  0742   WBC Thousand/uL 5.82   HEMOGLOBIN g/dL 16.8   HEMATOCRIT % 51.4*   PLATELETS Thousands/uL 186   SEGS PCT % 55   LYMPHO PCT % 36   MONO PCT % 7   EOS PCT % 2     Results from last 7 days   Lab Units 10/28/24  0742   SODIUM mmol/L 139   POTASSIUM mmol/L 3.8   CHLORIDE mmol/L 103   CO2 mmol/L 30   BUN mg/dL 21   CREATININE mg/dL 0.85   ANION GAP mmol/L 6   CALCIUM mg/dL 9.4   GLUCOSE RANDOM mg/dL 100     Results from last 7 days   Lab Units 10/28/24  0742   INR  0.88         No results found for: \"HGBA1C\"        Imaging Results Review: I personally reviewed the following image studies in PACS and associated radiology reports: chest xray. My interpretation of the radiology images/reports is: Chest x-ray with right-sided chest tube.  Other Study Results Review: EKG was reviewed.     Administrative Statements     ** Please Note: This note has been constructed using a voice recognition system. **    "

## 2024-10-28 NOTE — ASSESSMENT & PLAN NOTE
Patient's third episode since 2017, twice on the right once on the left  No family history, no history of cystic fibrosis  Chest tube in place  Pulmonary consulted  Should be a candidate for thoracic surgery evaluation

## 2024-10-28 NOTE — ED PROCEDURE NOTE
Procedure  Chest Tube    Date/Time: 10/28/2024 9:46 AM    Performed by: Valerie Yu PA-C  Authorized by: Valerie Yu PA-C    Patient location:  ED  Other Assisting Provider: Yes (comment) (Fortino Cash PA-C)    Consent:     Consent obtained:  Verbal and written    Consent given by:  Patient    Risks discussed:  Bleeding, incomplete drainage, nerve damage, pain, infection and damage to surrounding structures  Universal protocol:     Procedure explained and questions answered to patient or proxy's satisfaction: yes      Relevant documents present and verified: yes      Test results available and properly labeled: yes      Radiology Images displayed and confirmed.  If images not available, report reviewed: yes      Required blood products, implants, devices, and special equipment available: yes      Site/side marked: yes      Immediately prior to procedure a time out was called: yes      Patient identity confirmed:  Verbally with patient and arm band  Pre-procedure details:     Skin preparation:  ChloraPrep    Preparation: Patient was prepped and draped in the usual sterile fashion    Indications:     Indications: pneumothroax    Procedure details:     Placement location:  Anterior    Laterality:  Right    Approach:  Open    Scalpel size:  11    Tube size (Fr):  12    Dissection instrument:  Finger and [place] clamp    Ultrasound guidance: no      Tension pneumothorax: no      Needle Decompression: no      Tube connected to:  Water seal    Drainage characteristics:  Air only    Suture material:  2-0 silk    Dressing:  4x4 sterile gauze and Xeroform gauze  Post-procedure details:     Post-insertion x-ray findings: tube in good position      Patient tolerance of procedure:  Tolerated well, no immediate complications    Complication (if applicable):  Perc converted to semi-open                   Valerie Yu PA-C  10/28/24 0970

## 2024-10-28 NOTE — ED PROVIDER NOTES
Time reflects when diagnosis was documented in both MDM as applicable and the Disposition within this note       Time User Action Codes Description Comment    10/28/2024  7:38 AM Fortino Lee Add [J93.9] Pneumothorax on right     10/28/2024 11:16 AM Benito Solorio Add [Z87.09] History of pneumothorax           ED Disposition       ED Disposition   Admit    Condition   Stable    Date/Time   Mon Oct 28, 2024  7:38 AM    Comment   --             Assessment & Plan       Medical Decision Making  Medical Decision Makin y.o. male with history of spontaneous pneumothorax presents to ED for evaluation of chest tightness.     Ddx: pneumothorax, ACS, pericarditis, pneumonia, bronchitis, pleural effusion, pericardial effusion or tamponade, URI, COVID, flu, bronchospasm, or esophageal rupture    Will obtain CXR to evaluate for pneumothorax and other pulmonary etiologies.  EKG to evaluate for ischemia or arrhythmia.  COVID/flu screening and re-evaluate.      Final Evaluation:  (see ED course for additional MDM)  Spontaneous right pneumothorax  Labs normal, covid/flu negative  12 Fr R chest tube placed with interval resolution on xray.  Supervised chest tube placement by ULISES Yu PA-C.  Please see aligned procedure note for details.      On re-evaluation: well appearing in NAD, vital signs are normal. A&O x 3, airway patent, chest pain improved, no respiratory distress, Abdomen non distended, non tender. M/S no gross deformity, THOMAS x 4,GCS 15     Case discussed with ANDRY Salgado regarding admission for further management.      The critical care time is separate of other billable procedures, treating other patients, and any teaching time.    The critical care time was for: obtaining history from patient or surrogate, development of treatment plan with patient or surrogate, discussion with any applicable consultants, examination of the patient,ordering and performing treatments and interventions,  ordering and reviewing any applicable laboratory or radiographic studies, reassessment of the patient for response to treatment, reviewing any pertinent and available old records, documentation time.    The critical care time was necessary to prevent deterioration of the following organ systems: cardiopulmonary system, acute pneumothorax     Time spent (in minutes):45          Amount and/or Complexity of Data Reviewed  Labs: ordered.  Radiology: ordered and independent interpretation performed. Decision-making details documented in ED Course.    Risk  Prescription drug management.  Decision regarding hospitalization.        ED Course as of 10/28/24 1450   Mon Oct 28, 2024   0651 On initial evaluation: well appearing in NAD, vital signs are normal. Afebrile, no tachypnea or hypoxia.  A&O x 3, airway patent, no JVD.  no respiratory distress, crackles, wheezes or retractions.extremiteis non tender. M/S no gross deformity, THOMAS x 4,GCS 15    0652 XR chest 2 views   0707 .decxr   1002 XR chest 1 view portable  CXR ordered and interpreted by me.  My initial interpretation.  Interval chest tube placement with resolution of previously noted right pneumothorax.    1004 XR chest 1 view portable  Cxr ordered and interpreted by me.   Persistent R pneumothorax.  Chest tube not in pleural space,    1005 Re-evaluation:  patient feels WOB/chest tightness improved.  SPO2 % on RA.  CXR reviewed with interval pneumothorax resolution after chest tube.  D/w [facility] Dr. Solorio, regarding admission    1013 With patient's verbal consent - updated patient's father Sanjay Saunders by phone        Medications   midazolam (VERSED) injection 1 mg (1 mg Intravenous Given 10/28/24 0751)   fentaNYL injection 50 mcg (50 mcg Intravenous Given 10/28/24 0751)   lidocaine-epinephrine (XYLOCAINE/EPINEPHRINE) 1 %-1:100,000 injection 20 mL (20 mL Infiltration Given 10/28/24 0826)   sodium chloride 0.9 % bolus 1,000 mL (0 mL Intravenous Stopped  10/28/24 0922)   lidocaine-epinephrine (XYLOCAINE/EPINEPHRINE) 1 %-1:100,000 injection 20 mL (20 mL Infiltration Given 10/28/24 0923)       ED Risk Strat Scores                           SBIRT 22yo+      Flowsheet Row Most Recent Value   Initial Alcohol Screen: US AUDIT-C     1. How often do you have a drink containing alcohol? 0 Filed at: 10/28/2024 0636   2. How many drinks containing alcohol do you have on a typical day you are drinking?  0 Filed at: 10/28/2024 0636   3a. Male UNDER 65: How often do you have five or more drinks on one occasion? 0 Filed at: 10/28/2024 0636   3b. FEMALE Any Age, or MALE 65+: How often do you have 4 or more drinks on one occassion? 0 Filed at: 10/28/2024 0636   Audit-C Score 0 Filed at: 10/28/2024 0636   LOU: How many times in the past year have you...    Used an illegal drug or used a prescription medication for non-medical reasons? Never Filed at: 10/28/2024 0636                            History of Present Illness       Chief Complaint   Patient presents with    Shortness of Breath     Pt reports feeling short of breath approx 20 mins ago. Pt reports getting spontaneous pneumothorax and getting chest tubes. Pt concerned for collapsed lung. Pt reports pain midsternal with no radiation.       History reviewed. No pertinent past medical history.   Past Surgical History:   Procedure Laterality Date    ABDOMINAL WALL SURGERY N/A 2018    Procedure: Intermediate  CLOSURE WOUND  2 cm left neck;  Surgeon: Berto Chavez MD;  Location: [facility];  Service: General    FACIAL/NECK BIOPSY Left 2018    Procedure: NECK MASS EXCISION BIOPSY;  Surgeon: Berto Chavez MD;  Location: [facility];  Service: General    KNEE SURGERY        Family History   Family history unknown: Yes      Social History     Tobacco Use    Smoking status: Former     Current packs/day: 0.00     Types: Cigarettes     Quit date:      Years since quittin.8    Smokeless tobacco: Never   Vaping Use  "   Vaping status: Never Used   Substance Use Topics    Alcohol use: Yes     Alcohol/week: 2.0 standard drinks of alcohol     Types: 2 Cans of beer per week     Comment: socially     Drug use: Yes     Types: Marijuana     Comment: \"couple weeks ago\"      E-Cigarette/Vaping    E-Cigarette Use Never User       E-Cigarette/Vaping Substances      I have reviewed and agree with the history as documented.     29-year-old male with past medical history significant for spontaneous pneumothorax presents emergency department for evaluation of chest tightness that started approximately 20 minutes prior to arrival.  Location is reported as the midsternal, central chest, described as a tight sensation, that has been present constantly since symptom onset.  Denies recent fall injury or trauma.  Reports no associated fevers, chills, sore throat, abdominal pain, nausea, vomiting, rash, wheezing, extremity swelling or syncope.  Patient reports nothing has been taken for treatment of symptoms.  States symptoms are not similar to prior pneumothorax.  Denies pleuritic pain or pain that is worse with taking a deep inspiration.  Denies hemoptysis.      History provided by:  Patient   used: No    Shortness of Breath  Associated symptoms: no abdominal pain, no diaphoresis, no fever, no rash and no vomiting        Review of Systems   Constitutional:  Negative for chills, diaphoresis, fever and unexpected weight change.   Eyes:  Negative for visual disturbance.   Respiratory:  Positive for chest tightness and shortness of breath. Negative for stridor.    Cardiovascular:  Negative for palpitations and leg swelling.   Gastrointestinal:  Negative for abdominal pain, nausea and vomiting.   Genitourinary:  Negative for decreased urine volume, difficulty urinating, hematuria and urgency.   Musculoskeletal:  Negative for gait problem.   Skin:  Negative for rash.   Neurological:  Negative for seizures, syncope, facial asymmetry, " weakness and numbness.   All other systems reviewed and are negative.          Objective       ED Triage Vitals [10/28/24 0634]   Temperature Pulse Blood Pressure Respirations SpO2 Patient Position - Orthostatic VS   97.6 °F (36.4 °C) 71 156/89 20 98 % Lying      Temp Source Heart Rate Source BP Location FiO2 (%) Pain Score    Oral Monitor Right arm -- 7      Vitals      Date and Time Temp Pulse SpO2 Resp BP Pain Score FACES Pain Rating User   10/28/24 1350 -- -- 98 % -- -- -- -- AR   10/28/24 1344 -- -- -- -- -- 7 -- AR   10/28/24 1047 97.8 °F (36.6 °C) 73 100 % -- 114/74 -- -- DII   10/28/24 1000 -- 84 100 % 19 130/62 -- -- LM   10/28/24 0900 -- 90 99 % 20 126/73 -- -- LM   10/28/24 0830 -- 83 97 % 24 140/76 -- -- LM   10/28/24 0700 -- 72 96 % 13 123/68 -- -- LM   10/28/24 0634 97.6 °F (36.4 °C) 71 98 % 20 156/89 7 -- JR            Physical Exam  Vitals and nursing note reviewed.   Constitutional:       General: He is not in acute distress.     Appearance: Normal appearance.   HENT:      Head: Normocephalic and atraumatic.      Right Ear: External ear normal.      Left Ear: External ear normal.      Nose: Nose normal.   Eyes:      General: No scleral icterus.  Cardiovascular:      Rate and Rhythm: Normal rate.      Pulses: Normal pulses.   Pulmonary:      Effort: Pulmonary effort is normal.      Breath sounds: Normal breath sounds. No wheezing, rhonchi or rales.   Abdominal:      Tenderness: There is no abdominal tenderness. There is no guarding or rebound.   Musculoskeletal:         General: No deformity or signs of injury. Normal range of motion.      Cervical back: Normal range of motion and neck supple.   Skin:     General: Skin is dry.      Capillary Refill: Capillary refill takes less than 2 seconds.      Coloration: Skin is not jaundiced.      Findings: No rash.   Neurological:      General: No focal deficit present.      Mental Status: He is alert and oriented to person, place, and time. Mental status is  at baseline.   Psychiatric:         Mood and Affect: Mood normal.         Behavior: Behavior normal.         Thought Content: Thought content normal.         Results Reviewed       Procedure Component Value Units Date/Time    Basic metabolic panel [[id number]] Collected: 10/28/24 0742    Lab Status: Final result Specimen: Blood from Arm, Left Updated: 10/28/24 0804     Sodium 139 mmol/L      Potassium 3.8 mmol/L      Chloride 103 mmol/L      CO2 30 mmol/L      ANION GAP 6 mmol/L      BUN 21 mg/dL      Creatinine 0.85 mg/dL      Glucose 100 mg/dL      Calcium 9.4 mg/dL      eGFR 116 ml/min/1.73sq m     Narrative:      National Kid[employer] Foundation guidelines for Chronic Kidney Disease (CKD):     Stage 1 with normal or high GFR (GFR > 90 mL/min/1.73 square meters)    Stage 2 Mild CKD (GFR = 60-89 mL/min/1.73 square meters)    Stage 3A Moderate CKD (GFR = 45-59 mL/min/1.73 square meters)    Stage 3B Moderate CKD (GFR = 30-44 mL/min/1.73 square meters)    Stage 4 Severe CKD (GFR = 15-29 mL/min/1.73 square meters)    Stage 5 End Stage CKD (GFR <15 mL/min/1.73 square meters)  Note: GFR calculation is accurate only with a steady state creatinine    Protime-INR [[id number]]  (Normal) Collected: 10/28/24 0742    Lab Status: Final result Specimen: Blood from Arm, Left Updated: 10/28/24 0759     Protime 12.6 seconds      INR 0.88    Narrative:      INR Therapeutic Range    Indication                                             INR Range      Atrial Fibrillation                                               2.0-3.0  Hypercoagulable State                                    2.0.2.3  Left Ventricular Asist Device                            2.0-3.0  Mechanical Heart Valve                                  -    Aortic(with afib, MI, embolism, HF, LA enlargement,    and/or coagulopathy)                                     2.0-3.0 (2.5-3.5)     Mitral                                                              2.5-3.5  Prosthetic/Bioprosthetic Heart Valve               2.0-3.0  Venous thromboembolism (VTE: VT, PE        2.0-3.0    APTT [[id number]]  (Normal) Collected: 10/28/24 0742    Lab Status: Final result Specimen: Blood from Arm, Left Updated: 10/28/24 0759     PTT 28 seconds     CBC and differential [[id number]]  (Abnormal) Collected: 10/28/24 0742    Lab Status: Final result Specimen: Blood from Arm, Left Updated: 10/28/24 0748     WBC 5.82 Thousand/uL      RBC 5.55 Million/uL      Hemoglobin 16.8 g/dL      Hematocrit 51.4 %      MCV 93 fL      MCH 30.3 pg      MCHC 32.7 g/dL      RDW 11.7 %      MPV 9.8 fL      Platelets 186 Thousands/uL      nRBC 0 /100 WBCs      Segmented % 55 %      Immature Grans % 0 %      Lymphocytes % 36 %      Monocytes % 7 %      Eosinophils Relative 2 %      Basophils Relative 0 %      Absolute Neutrophils 3.15 Thousands/µL      Absolute Immature Grans 0.01 Thousand/uL      Absolute Lymphocytes 2.10 Thousands/µL      Absolute Monocytes 0.40 Thousand/µL      Eosinophils Absolute 0.14 Thousand/µL      Basophils Absolute 0.02 Thousands/µL     FLU/COVID Rapid Antigen (30 min. TAT) - Preferred screening test in ED [[id number]]  (Normal) Collected: 10/28/24 0705    Lab Status: Final result Specimen: Nares from Nose Updated: 10/28/24 0728     SARS COV Rapid Antigen Negative     Influenza A Rapid Antigen Negative     Influenza B Rapid Antigen Negative    Narrative:      This test has been performed using the Quidel Rachael 2 FLU+SARS Antigen test under the Emergency Use Authorization (EUA). This test has been validated by the  and verified by the performing laboratory. The Rachael uses lateral flow immunofluorescent sandwich assay to detect SARS-COV, Influenza A and Influenza B Antigen.     The Quidel Rachael 2 SARS Antigen test does not differentiate between SARS-CoV and SARS-CoV-2.     Negative results are presumptive and may be confirmed with a molecular assay, if necessary, for patient  management. Negative results do not rule out SARS-CoV-2 or influenza infection and should not be used as the sole basis for treatment or patient management decisions. A negative test result may occur if the level of antigen in a sample is below the limit of detection of this test.     Positive results are indicative of the presence of viral antigens, but do not rule out bacterial infection or co-infection with other viruses.     All test results should be used as an adjunct to clinical observations and other information available to the provider.    FOR PEDIATRIC PATIENTS - copy/paste COVID Guidelines URL to browser: [email or web address]            XR chest 1 view portable   ED Interpretation by Fortino Lee PA-C (10/28 1004)   R chest tube with resolution of R pneumothorax.       Final Interpretation by Pino Allen MD (10/28 1225)      Right-sided pneumothorax markedly diminished in size with only a minimal right apical component seen on this study.      Right chest tube not appreciated on the field-of-view of this examination. Correlate with clinical findings/scenario.      The examination demonstrates a significant  finding and was documented as such in [facility] for liaison and referring practitioner immediate notification.            Workstation performed: [id number]         XR chest 1 view portable   Final Interpretation by Pino Allen MD (10/28 4164)      Persistent large right-sided pneumothorax, without a significant change.            Workstation performed: [id number]         XR chest portable   Final Interpretation by Seth Urbina MD (10/28 6973)      A right-sided chest tube appears to terminate in the chest wall with adequate entering the pleural space. A large right pneumothorax is unchanged since a chest radiograph from earlier in the morning.      I have reviewed this radiograph with the Fortino Lee physician assistant in the  emergency department.            Workstation performed: [id number]         XR chest 2 views   ED Interpretation by Fortino Lee PA-C (10/28 9613)   Abnormal   Acute right pneumothorax      Final Interpretation by Bob Miranda MD (10/28 5420)      Large right-sided pneumothorax. Midline trachea.      This report is in agreement with the preliminary interpretation.         Workstation performed: [id number]             ECG 12 Lead Documentation Only    Date/Time: 10/28/2024 6:43 AM    Performed by: Fortino Lee PA-C  Authorized by: Fortino Lee PA-C    Indications / Diagnosis:  C/p  ECG reviewed by me, the ED Provider: yes    Patient location:  ED  Previous ECG:     Previous ECG:  Compared to current    Comparison ECG info:  2/9/22    Similarity:  No change    Comparison to cardiac monitor: Yes    Interpretation:     Interpretation: normal    Rate:     ECG rate:  60    ECG rate assessment: normal    Rhythm:     Rhythm: sinus rhythm    Ectopy:     Ectopy: none    QRS:     QRS axis:  Right    QRS intervals:  Normal  Conduction:     Conduction: normal    ST segments:     ST segments:  Normal  T waves:     T waves: normal        ED Medication and Procedure Management   Prior to Admission Medications   Prescriptions Last Dose Informant Patient Reported? Taking?   acetaminophen (TYLENOL) 500 mg tablet Not Taking Self No No   Sig: Take 1 tablet (500 mg total) by mouth every 6 (six) hours as needed for mild pain or moderate pain   Patient not taking: Reported on 5/6/2024   ibuprofen (MOTRIN) 800 mg tablet Not Taking Self No No   Sig: Take 1 tablet (800 mg total) by mouth every 8 (eight) hours as needed for mild pain or moderate pain   Patient not taking: Reported on 5/6/2024      Facility-Administered Medications: None     Current Discharge Medication List        CONTINUE these medications which have NOT CHANGED    Details   acetaminophen (TYLENOL) 500 mg tablet Take 1 tablet (500 mg total) by  mouth every 6 (six) hours as needed for mild pain or moderate pain  Qty: 30 tablet, Refills: 0    Associated Diagnoses: Mass of left side of neck      ibuprofen (MOTRIN) 800 mg tablet Take 1 tablet (800 mg total) by mouth every 8 (eight) hours as needed for mild pain or moderate pain  Qty: 30 tablet, Refills: 0    Associated Diagnoses: Mass of left side of neck           No discharge procedures on file.  ED SEPSIS DOCUMENTATION   Time reflects when diagnosis was documented in both MDM as applicable and the Disposition within this note       Time User Action Codes Description Comment    10/28/2024  7:38 AM Fortino Lee Add [J93.9] Pneumothorax on right     10/28/2024 11:16 AM Benito Solorio Add [Z87.09] History of pneumothorax                  Fortino Lee PA-C  10/28/24 0767

## 2024-10-28 NOTE — ASSESSMENT & PLAN NOTE
Right sided spontaneous pneumothorax, previously had a right sided spontaneous pneumothorax in 2022, left-sided pneumothorax in 2017  Chest tube was placed this morning, small right apical pneumothorax remaining on follow-up chest x-ray  Will continue chest tube to suction and repeat chest x-ray tomorrow morning  Will also send him for CT scan of the chest once this pneumothorax resolves in order to better evaluate the lung parenchyma  Would likely need thoracic surgery evaluation given this is his second pneumothorax on the side

## 2024-10-28 NOTE — PLAN OF CARE
Problem: RESPIRATORY - ADULT  Goal: Achieves optimal ventilation and oxygenation  Description: INTERVENTIONS:  - Assess for changes in respiratory status  - Assess for changes in mentation and behavior  - Position to facilitate oxygenation and minimize respiratory effort  - Oxygen administered by appropriate delivery if ordered  - Initiate smoking cessation education as indicated  - Encourage broncho-pulmonary hygiene including cough, deep breathe, Incentive Spirometry  - Assess the need for suctioning and aspirate as needed  - Assess and instruct to report SOB or any respiratory difficulty  - Respiratory Therapy support as indicated  Outcome: Progressing     Problem: PAIN - ADULT  Goal: Verbalizes/displays adequate comfort level or baseline comfort level  Description: Interventions:  - Encourage patient to monitor pain and request assistance  - Assess pain using appropriate pain scale  - Administer analgesics based on type and severity of pain and evaluate response  - Implement non-pharmacological measures as appropriate and evaluate response  - Consider cultural and social influences on pain and pain management  - Notify physician/advanced practitioner if interventions unsuccessful or patient reports new pain  Outcome: Progressing

## 2024-10-28 NOTE — CONSULTS
Consultation - Pulmonology   Name: Jaswant Saunders 29 y.o. male I MRN: [id number]  Unit/Bed#: -01 I Date of Admission: 10/28/2024   Date of Service: 10/28/2024 I Hospital Day: 0   Inpatient consult to Pulmonology  Consult performed by: Oren Davis PA-C  Consult ordered by: Benito Solorio MD        Physician Requesting Evaluation: Gelacio Carroll MD   Reason for Evaluation / Principal Problem: Pneumothorax    Assessment & Plan  Recurrent spontaneous pneumothorax  Right sided spontaneous pneumothorax, previously had a right sided spontaneous pneumothorax in 2022, left-sided pneumothorax in 2017  Chest tube was placed this morning, small right apical pneumothorax remaining on follow-up chest x-ray  Will continue chest tube to suction and repeat chest x-ray tomorrow morning  Will also send him for CT scan of the chest once this pneumothorax resolves in order to better evaluate the lung parenchyma  Would likely need thoracic surgery evaluation given this is his second pneumothorax on the side  Pulmonology service will follow.  Please contact the SecureChat role for the Pulmonology service with any questions/concerns.    History of Present Illness   Jaswant Saunders is a 29 y.o. male with past medical history of left-sided pneumothorax in 2017, right-sided pneumothorax in 2022 who presents with complaint of shortness of breath and chest pain that woke him during the night.  He was found to have a pneumothorax and had chest tube placed.  Overall he feels much better aside from some discomfort at the chest tube site.    He had a left-sided pneumothorax in 2017 status post chest tube, pneumothorax in 2022 on the right status post chest tube.    Review of Systems   Constitutional: Negative.    HENT: Negative.     Respiratory: Negative.     Cardiovascular: Negative.    Gastrointestinal: Negative.    Genitourinary: Negative.    Musculoskeletal: Negative.    Skin: Negative.    Allergic/Immunologic: Negative.     Neurological: Negative.    Psychiatric/Behavioral: Negative.         Historical Information   I have reviewed the patient's PMH, PSH, Social History, Family History, Meds, and Allergies    Current Facility-Administered Medications:     heparin (porcine) subcutaneous injection 5,000 Units, Q8H KENDALL    ketorolac (TORADOL) injection 15 mg, Q6H PRN    traMADol (ULTRAM) tablet 50 mg, Q6H PRN  Prior to Admission Medications   Prescriptions Last Dose Informant Patient Reported? Taking?   acetaminophen (TYLENOL) 500 mg tablet Not Taking Self No No   Sig: Take 1 tablet (500 mg total) by mouth every 6 (six) hours as needed for mild pain or moderate pain   Patient not taking: Reported on 5/6/2024   ibuprofen (MOTRIN) 800 mg tablet Not Taking Self No No   Sig: Take 1 tablet (800 mg total) by mouth every 8 (eight) hours as needed for mild pain or moderate pain   Patient not taking: Reported on 5/6/2024      Facility-Administered Medications: None     Tobacco History: nonsmoker  Occupational History:   Family History:  Family History   Family history unknown: Yes       Objective :  Temp:  [97.6 °F (36.4 °C)-97.8 °F (36.6 °C)] 97.8 °F (36.6 °C)  HR:  [71-90] 73  BP: (114-156)/(62-89) 114/74  Resp:  [13-24] 19  SpO2:  [96 %-100 %] 100 %  O2 Device: Nasal cannula  Nasal Cannula O2 Flow Rate (L/min):  [2 L/min] 2 L/min    Physical Exam  Vitals reviewed.   Constitutional:       Appearance: Normal appearance.   HENT:      Head: Normocephalic and atraumatic.      Mouth/Throat:      Pharynx: Oropharynx is clear.   Eyes:      Conjunctiva/sclera: Conjunctivae normal.   Cardiovascular:      Rate and Rhythm: Normal rate and regular rhythm.   Pulmonary:      Effort: Pulmonary effort is normal. No respiratory distress.      Breath sounds: Normal breath sounds. No decreased breath sounds, wheezing, rhonchi or rales.      Comments: Chest tube in place, no air leak  Abdominal:      General: Abdomen is flat. There is no distension.    Musculoskeletal:         General: Normal range of motion.      Cervical back: Normal range of motion.      Right lower leg: No edema.      Left lower leg: No edema.   Skin:     General: Skin is warm and dry.   Neurological:      Mental Status: He is alert and oriented to person, place, and time.   Psychiatric:         Mood and Affect: Mood normal.         Behavior: Behavior normal.           Lab Results: I have reviewed the following results:  .     10/28/24  0742   WBC 5.82   HGB 16.8   HCT 51.4*      SODIUM 139   K 3.8      CO2 30   BUN 21   CREATININE 0.85   GLUC 100   PTT 28   INR 0.88     ABG: No new results in last 24 hours.    Imaging Results Review: I reviewed radiology reports from this admission including: chest xray.  Other Study Results Review: No additional pertinent studies reviewed.  PFT Results Reviewed: none available    VTE Prophylaxis: VTE covered by:  heparin (porcine), Subcutaneous

## 2024-10-29 ENCOUNTER — APPOINTMENT (INPATIENT)
Dept: CT IMAGING | Facility: HOSPITAL | Age: 29
DRG: 143 | End: 2024-10-29
Payer: COMMERCIAL

## 2024-10-29 ENCOUNTER — APPOINTMENT (INPATIENT)
Dept: RADIOLOGY | Facility: HOSPITAL | Age: 29
DRG: 143 | End: 2024-10-29
Payer: COMMERCIAL

## 2024-10-29 PROCEDURE — 71250 CT THORAX DX C-: CPT

## 2024-10-29 PROCEDURE — 71045 X-RAY EXAM CHEST 1 VIEW: CPT

## 2024-10-29 PROCEDURE — 99232 SBSQ HOSP IP/OBS MODERATE 35: CPT | Performed by: INTERNAL MEDICINE

## 2024-10-29 RX ADMIN — Medication 2.5 MG: at 22:12

## 2024-10-29 RX ADMIN — KETOROLAC TROMETHAMINE 15 MG: 30 INJECTION, SOLUTION INTRAMUSCULAR; INTRAVENOUS at 02:13

## 2024-10-29 RX ADMIN — Medication 2.5 MG: at 16:00

## 2024-10-29 RX ADMIN — Medication 2.5 MG: at 09:38

## 2024-10-29 NOTE — UTILIZATION REVIEW
Initial Clinical Review    Admission: Date/Time/Statement:   Admission Orders (From admission, onward)       Ordered        10/28/24 0951  INPATIENT ADMISSION  Once                          Orders Placed This Encounter   Procedures    INPATIENT ADMISSION     Standing Status:   Standing     Number of Occurrences:   1     Order Specific Question:   Level of Care     Answer:   Med Surg [16]     Order Specific Question:   Estimated length of stay     Answer:   More than 2 Midnights     Order Specific Question:   Certification     Answer:   I certify that inpatient services are medically necessary for this patient for a duration of greater than two midnights. See H&P and MD Progress Notes for additional information about the patient's course of treatment.     ED Arrival Information       Expected   -    Arrival   10/28/2024 06:26    Acuity   Urgent              Means of arrival   Walk-In    Escorted by   Family Member    Service   Hospitalist    Admission type   Emergency              Arrival complaint   SOB             Chief Complaint   Patient presents with    Shortness of Breath     Pt reports feeling short of breath approx 20 mins ago. Pt reports getting spontaneous pneumothorax and getting chest tubes. Pt concerned for collapsed lung. Pt reports pain midsternal with no radiation.       Initial Presentation: 29 y.o. male to ED from home w/  PMH of spontaneous pneumothorax who presents with Patient states he was sleeping and around 7 AM this morning he was awoken with a stabbing severe pain in his back.  He immediately became short of breath and felt this was similar to prior episodes when he had a spontaneous pneumothorax . RR 24 . Initial chest x-ray showed large right pneumothorax.  In the ED was treated with fentanyl, Versed, 1 L NS bolus and chest tube placement.  Admitted IP status w/ recurrent spontaneous pneumothorax . Plan chest tube , pulm consult , thoracic surgery consult .        Anticipated Length of  Stay/Certification Statement:  Patient will be admitted on an inpatient basis with an anticipated length of stay of greater than 2 midnights secondary to chest tube in place.     10/28 Pulm Consult   Right-sided pneumothorax spontaneous  Status post chest tube placement. Plan s/p chest tube placement , cont to suction , f/u CXR in am .   given his recurrent pneumothorax likely with rupture of his subpleural bleb     Date:  10/29  Day 2: complains of mild discomfort associated with the chest tube in place. Chest tube in place; no bubbling/air leak noted . Plan to repeat chest imaging today . May need transfer to [facility] for thoracic surgery eval . Normal breath sounds.     ED Treatment-Medication Administration from 10/28/2024 0626 to 10/28/2024 1045         Date/Time Order Dose Route Action     10/28/2024 0751 midazolam (VERSED) injection 1 mg 1 mg Intravenous Given     10/28/2024 0751 fentaNYL injection 50 mcg 50 mcg Intravenous Given     10/28/2024 0826 lidocaine-epinephrine (XYLOCAINE/EPINEPHRINE) 1 %-1:100,000 injection 20 mL 20 mL Infiltration Given     10/28/2024 0751 sodium chloride 0.9 % bolus 1,000 mL 1,000 mL Intravenous New Bag     10/28/2024 0923 lidocaine-epinephrine (XYLOCAINE/EPINEPHRINE) 1 %-1:100,000 injection 20 mL 20 mL Infiltration Given            Scheduled Medications:  heparin (porcine), 5,000 Units, Subcutaneous, Q8H KENDALL      Continuous IV Infusions:     PRN Meds:  HYDROmorphone, 0.2 mg, Intravenous, Q2H PRN  ketorolac, 15 mg, Intravenous, Q6H PRN  10/28 x1  10/29  x1  oxyCODONE, 2.5 mg, Oral, Q4H PRN   Or  oxyCODONE, 5 mg, Oral, Q4H PRN      ED Triage Vitals [10/28/24 0634]   Temperature Pulse Respirations Blood Pressure SpO2 Pain Score   97.6 °F (36.4 °C) 71 20 156/89 98 % 7     Weight (last 2 days)       Date/Time Weight    10/28/24 0634 64.4 (142)            Vital Signs (last 3 days)       Date/Time Temp Pulse Resp BP MAP (mmHg) SpO2 Calculated FIO2 (%) - Nasal Cannula Nasal Cannula O2 Flow  Rate (L/min) O2 Device Patient Position - Orthostatic VS Pain    10/29/24 0213 -- -- -- -- -- -- -- -- -- -- 7    10/29/24 02:11:07 -- 54 -- 125/63 84 98 % -- -- -- -- --    10/28/24 21:56:50 -- 67 -- 124/65 85 97 % -- -- -- -- --    10/28/24 20:55:25 98.6 °F (37 °C) 58 -- 124/63 83 97 % -- -- -- -- --    10/28/24 20:55:13 98.6 °F (37 °C) 62 -- 124/63 83 96 % -- -- -- -- --    10/28/24 1920 -- -- -- -- -- -- -- -- None (Room air) -- 7    10/28/24 1852 -- -- -- -- -- -- -- -- -- -- 6    10/28/24 1500 98 °F (36.7 °C) 76 18 131/96 108 98 % 28 2 L/min Nasal cannula Lying No Pain    10/28/24 1414 -- -- -- -- -- -- -- -- -- -- 3    10/28/24 1350 -- -- -- -- -- 98 % 28 2 L/min Nasal cannula -- --    10/28/24 1344 -- -- -- -- -- -- -- -- -- -- 7    10/28/24 10:47:46 97.8 °F (36.6 °C) 73 -- 114/74 87 100 % -- -- -- -- --    10/28/24 1000 -- 84 19 130/62 89 100 % 28 2 L/min Nasal cannula -- --    10/28/24 0900 -- 90 20 126/73 93 99 % -- -- None (Room air) Lying --    10/28/24 0830 -- 83 24 140/76 101 97 % -- -- None (Room air) Lying --    10/28/24 0700 -- 72 13 123/68 90 96 % -- -- None (Room air) -- --    10/28/24 0634 97.6 °F (36.4 °C) 71 20 156/89 114 98 % -- -- None (Room air) Lying 7              Pertinent Labs/Diagnostic Test Results:   Radiology:  XR chest 1 view portable   ED Interpretation by Fortino Lee PA-C (10/28 1004)   R chest tube with resolution of R pneumothorax.       Final Interpretation by Pino Allen MD (10/28 1115)      Right-sided pneumothorax markedly diminished in size with only a minimal right apical component seen on this study.      Right chest tube not appreciated on the field-of-view of this examination. Correlate with clinical findings/scenario.      The examination demonstrates a significant  finding and was documented as such in [facility] for liaison and referring practitioner immediate notification.            Workstation performed: [id number]         XR chest 1 view portable   Final  Interpretation by Pino Allen MD (10/28 1340)      Persistent large right-sided pneumothorax, without a significant change.            Workstation performed: [id number]         XR chest portable   Final Interpretation by Seth Urbina MD (10/28 0850)      A right-sided chest tube appears to terminate in the chest wall with adequate entering the pleural space. A large right pneumothorax is unchanged since a chest radiograph from earlier in the morning.      I have reviewed this radiograph with the Fortino Lee physician assistant in the emergency department.            Workstation performed: [id number]         XR chest 2 views   ED Interpretation by Fortino Lee PA-C (10/28 0707)   Abnormal   Acute right pneumothorax      Final Interpretation by Bob Miranda MD (10/28 1040)      Large right-sided pneumothorax. Midline trachea.      This report is in agreement with the preliminary interpretation.         Workstation performed: [id number]         XR chest portable    (Results Pending)     Cardiology:  ECG 12 lead   Final Result by Lorie Wyman MD (10/28 9563)   Normal sinus rhythm   Rightward axis      Confirmed by Lorie Wyman ([id number]) on 10/28/2024 1:53:09 PM        GI:  No orders to display           Results from last 7 days   Lab Units 10/28/24  0742   WBC Thousand/uL 5.82   HEMOGLOBIN g/dL 16.8   HEMATOCRIT % 51.4*   PLATELETS Thousands/uL 186   TOTAL NEUT ABS Thousands/µL 3.15         Results from last 7 days   Lab Units 10/28/24  0742   SODIUM mmol/L 139   POTASSIUM mmol/L 3.8   CHLORIDE mmol/L 103   CO2 mmol/L 30   ANION GAP mmol/L 6   BUN mg/dL 21   CREATININE mg/dL 0.85   EGFR ml/min/1.73sq m 116   CALCIUM mg/dL 9.4         Results from last 7 days   Lab Units 10/28/24  0742   GLUCOSE RANDOM mg/dL 100       Results from last 7 days   Lab Units 10/28/24  0742   PROTIME seconds 12.6   INR  0.88   PTT seconds 28           History reviewed. No pertinent past medical  history.  Present on Admission:   Recurrent spontaneous pneumothorax      Admitting Diagnosis: SOB (shortness of breath) [R06.02]  Pneumothorax on right [J93.9]  Age/Sex: 29 y.o. male    Network Utilization Review Department  ATTENTION: Please call with any questions or concerns to [phone] and carefully listen to the prompts so that you are directed to the right person. All voicemails are confidential.   For Discharge needs, contact Care Management DC Support Team at [phone] opt. 2  Send all requests for admission clinical reviews, approved or denied determinations and any other requests to dedicated fax number below belonging to the campus where the patient is receiving treatment. List of dedicated fax numbers for the Facilities:  FACILITY NAME UR FAX NUMBER   ADMISSION DENIALS (Administrative/Medical Necessity) [phone]   DISCHARGE SUPPORT TEAM (NETWORK) [phone]   PARENT CHILD HEALTH (Maternity/NICU/Pediatrics) [phone]   [facility] [phone]   [facility] [phone]   [facility] [phone]   [facility] [phone]   [facility] [phone]   [facility] [phone]   [facility] [phone]   [facility] [phone]   [facility] [phone]   [facility] [phone]   [facility] [phone]   [facility] [phone]

## 2024-10-29 NOTE — CASE MANAGEMENT
Case Management Assessment & Discharge Planning Note    Patient name Jaswant Saunders  Location /-01 MRN [id number]  : 1995 Date 10/29/2024       Current Admission Date: 10/28/2024  Current Admission Diagnosis:Recurrent spontaneous pneumothorax   Patient Active Problem List    Diagnosis Date Noted Date Diagnosed    History of pneumothorax 2024     Chest tightness 2024     Recurrent spontaneous pneumothorax 2017       LOS (days): 1  Geometric Mean LOS (GMLOS) (days):   Days to GMLOS:     OBJECTIVE:    Risk of Unplanned Readmission Score: 5.17         Current admission status: Inpatient       Preferred Pharmacy:   [id number] [place] - [address]  [address]  [place] [address]  Phone: [phone] Fax: [phone]    Primary Care Provider: Nadia Meléndez MD    Primary Insurance: [employer] PENDING  Secondary Insurance:     ASSESSMENT:  Active Health Care Proxies       Sanjay Saunders Health Care Representative - Father   Primary Phone: [phone] (Mobile)                 Advance Directives  Does patient have a Health Care POA?: No  Was patient offered paperwork?: Yes  Does patient currently have a Health Care decision maker?: No  Does patient have Advance Directives?: No  Was patient offered paperwork?: Yes  Primary Contact: mother, father, wife         Readmission Root Cause  30 Day Readmission: No    Patient Information  Admitted from:: Home  Mental Status: Alert  During Assessment patient was accompanied by: Not accompanied during assessment  Assessment information provided by:: Patient  Primary Caregiver: Self  Support Systems: Family members, Spouse/significant other, Parent  County of Residence: [place]  What city do you live in?: [place]  Home entry access options. Select all that apply.: No steps to enter home  Type of Current Residence: Bi-level  Upon entering residence, is there a bedroom on the main floor (no further steps)?:  No  A bedroom is located on the following floor levels of residence (select all that apply):: 2nd Floor  Upon entering residence, is there a bathroom on the main floor (no further steps)?: No  Indicate which floors of current residence have a bathroom (select all the apply):: 2nd Floor  Number of steps to 2nd floor from main floor: 5  Living Arrangements: Lives w/ Spouse/significant other, Lives w/ Parent(s)  Is patient a ?: No    Activities of Daily Living Prior to Admission  Functional Status: Independent  Completes ADLs independently?: Yes  Ambulates independently?: Yes  Does patient use assisted devices?: No  Does patient currently own DME?: No  Does patient have a history of Outpatient Therapy (PT/OT)?: No  Does the patient have a history of Short-Term Rehab?: No  Does patient have a history of HHC?: No  Does patient currently have HHC?: No         Patient Information Continued  Income Source: Employed  Does patient have prescription coverage?: No (switched insurances and new one starts on Nov 1st)  Does patient receive dialysis treatments?: No  Does patient have a history of substance abuse?: No  Does patient have a history of Mental Health Diagnosis?: No         Means of Transportation  Means of Transport to Appts:: Drives Self      Social Determinants of Health (SDOH)      Flowsheet Row Most Recent Value   Housing Stability    In the last 12 months, was there a time when you were not able to pay the mortgage or rent on time? N   In the past 12 months, how many times have you moved where you were living? 0   At any time in the past 12 months, were you homeless or living in a shelter (including now)? N   Transportation Needs    In the past 12 months, has lack of transportation kept you from medical appointments or from getting medications? no   In the past 12 months, has lack of transportation kept you from meetings, work, or from getting things needed for daily living? No   Food Insecurity    Within  the past 12 months, you worried that your food would run out before you got the money to buy more. Never true   Within the past 12 months, the food you bought just didn't last and you didn't have money to get more. Never true   Utilities    In the past 12 months has the electric, gas, oil, or water company threatened to shut off services in your home? No            DISCHARGE DETAILS:    Discharge planning discussed with:: Patient at the bedside  Freedom of Choice: Yes  Comments - Freedom of Choice: FOC maintained in discussion regarding discharge planning. Patient is alert oriented and competent to make decisions. Introduced self and role, explained role of CM in arranging services such as DME, STR, HHC, and providing community resource information. Discussed current living situation and needs at discharge. Patient is IPTA. CM offered HHC, STR, DME, PCP, OP CM, community resources. Patient states that he switched insurances and new one starts on Nov 1st, and currently doesn't have insurance for this hospitalization- CM will followup with hospital financial counselors. Does not use DME. Pt is aware and encouraged to seek CM for any questions or concerns. CM continues to follow.  CM contacted family/caregiver?: No- see comments  Were Treatment Team discharge recommendations reviewed with patient/caregiver?: Yes  Did patient/caregiver verbalize understanding of patient care needs?: Yes  Were patient/caregiver advised of the risks associated with not following Treatment Team discharge recommendations?: Yes    Contacts  Patient Contacts: mother Donna  Relationship to Patient:: Family  Reason/Outcome: Emergency Contact    Requested Home Health Care         Is the patient interested in HHC at discharge?: No    DME Referral Provided  Referral made for DME?: No    Other Referral/Resources/Interventions Provided:  Financial Resources Provided: Financial Counselor, Medicaid  Referral Comments: CM will continue to follow for  needs.  Government Services:: Medical Assistance    Would you like to participate in our [facility] service program?  : No - Declined    Treatment Team Recommendation: Acute Hospital Transfer     Transport at Discharge : [facility]

## 2024-10-29 NOTE — PLAN OF CARE
Problem: RESPIRATORY - ADULT  Goal: Achieves optimal ventilation and oxygenation  Description: INTERVENTIONS:  - Assess for changes in respiratory status  - Assess for changes in mentation and behavior  - Position to facilitate oxygenation and minimize respiratory effort  - Oxygen administered by appropriate delivery if ordered  - Initiate smoking cessation education as indicated  - Encourage broncho-pulmonary hygiene including cough, deep breathe, Incentive Spirometry  - Assess the need for suctioning and aspirate as needed  - Assess and instruct to report SOB or any respiratory difficulty  - Respiratory Therapy support as indicated  Outcome: Progressing     Problem: PAIN - ADULT  Goal: Verbalizes/displays adequate comfort level or baseline comfort level  Description: Interventions:  - Encourage patient to monitor pain and request assistance  - Assess pain using appropriate pain scale  - Administer analgesics based on type and severity of pain and evaluate response  - Implement non-pharmacological measures as appropriate and evaluate response  - Consider cultural and social influences on pain and pain management  - Notify physician/advanced practitioner if interventions unsuccessful or patient reports new pain  Outcome: Progressing     Problem: INFECTION - ADULT  Goal: Absence or prevention of progression during hospitalization  Description: INTERVENTIONS:  - Assess and monitor for signs and symptoms of infection  - Monitor lab/diagnostic results  - Monitor all insertion sites, i.e. indwelling lines, tubes, and drains  - Monitor endotracheal if appropriate and nasal secretions for changes in amount and color  - [place] appropriate cooling/warming therapies per order  - Administer medications as ordered  - Instruct and encourage patient and family to use good hand hygiene technique  - Identify and instruct in appropriate isolation precautions for identified infection/condition  Outcome: Progressing     Problem:  SAFETY ADULT  Goal: Patient will remain free of falls  Description: INTERVENTIONS:  - Educate patient/family on patient safety including physical limitations  - Instruct patient to call for assistance with activity   - Consult OT/PT to assist with strengthening/mobility   - Keep Call bell within reach  - Keep bed low and locked with side rails adjusted as appropriate  - Keep care items and personal belongings within reach  - Initiate and maintain comfort rounds  - Make Fall Risk Sign visible to staff  - Apply yellow socks and bracelet for high fall risk patients  - Consider moving patient to room near nurses station  Outcome: Progressing  Goal: Maintain or return to baseline ADL function  Description: INTERVENTIONS:  -  Assess patient's ability to carry out ADLs; assess patient's baseline for ADL function and identify physical deficits which impact ability to perform ADLs (bathing, care of mouth/teeth, toileting, grooming, dressing, etc.)  - Assess/evaluate cause of self-care deficits   - Assess range of motion  - Assess patient's mobility; develop plan if impaired  - Assess patient's need for assistive devices and provide as appropriate  - Encourage maximum independence but intervene and supervise when necessary  - Involve family in performance of ADLs  - Assess for home care needs following discharge   - Consider OT consult to assist with ADL evaluation and planning for discharge  - Provide patient education as appropriate  Outcome: Progressing  Goal: Maintains/Returns to pre admission functional level  Description: INTERVENTIONS:  - Perform AM-PAC 6 Click Basic Mobility/ Daily Activity assessment daily.  - Set and communicate daily mobility goal to care team and patient/family/caregiver.   - Collaborate with rehabilitation services on mobility goals if consulted  - Out of bed for toileting  - Record patient progress and toleration of activity level   Outcome: Progressing     Problem: DISCHARGE PLANNING  Goal:  Discharge to home or other facility with appropriate resources  Description: INTERVENTIONS:  - Identify barriers to discharge w/patient and caregiver  - Arrange for needed discharge resources and transportation as appropriate  - Identify discharge learning needs (meds, wound care, etc.)  - Arrange for interpretive services to assist at discharge as needed  - Refer to Case Management Department for coordinating discharge planning if the patient needs post-hospital services based on physician/advanced practitioner order or complex needs related to functional status, cognitive ability, or social support system  Outcome: Progressing     Problem: Knowledge Deficit  Goal: Patient/family/caregiver demonstrates understanding of disease process, treatment plan, medications, and discharge instructions  Description: Complete learning assessment and assess knowledge base.  Interventions:  - Provide teaching at level of understanding  - Provide teaching via preferred learning methods  Outcome: Progressing     Problem: Prexisting or High Potential for Compromised Skin Integrity  Goal: Skin integrity is maintained or improved  Description: INTERVENTIONS:  - Identify patients at risk for skin breakdown  - Assess and monitor skin integrity  - Assess and monitor nutrition and hydration status  - Monitor labs   - Assess for incontinence   - Turn and reposition patient  - Assist with mobility/ambulation  - Relieve pressure over bony prominences  - Avoid friction and shearing  - Provide appropriate hygiene as needed including keeping skin clean and dry  - Evaluate need for skin moisturizer/barrier cream  - Collaborate with interdisciplinary team   - Patient/family teaching  - Consider wound care consult   Outcome: Progressing

## 2024-10-29 NOTE — ASSESSMENT & PLAN NOTE
Right sided spontaneous pneumothorax, previously had a right sided spontaneous pneumothorax in 2022, left-sided pneumothorax in 2017  Chest tube in place, no air leak  CXR this morning shows resolution of pneumothorax  Chest tube to be placed to water seal  CT chest today and will reach out to thoracic surgery

## 2024-10-29 NOTE — CASE MANAGEMENT
Case Management Progress Note    Patient name Jaswant Saunders  Location /-01 MRN [id number]  : 1995 Date 10/29/2024       LOS (days): 1  Geometric Mean LOS (GMLOS) (days): 2.2  Days to GMLOS:1        OBJECTIVE:        Current admission status: Inpatient  Preferred Pharmacy:   [facility]/pharmacy #[id number] - [place] - [address]  [address]  [place] [address]  Phone: [phone] Fax: [phone]    Primary Care Provider: Nadia Meléndez MD    Primary Insurance: JOSE [employer] PENDING  Secondary Insurance:     PROGRESS NOTE:  CM dropped off some information regarding PATHs to patient and wife at bedside. Financial counseling dropped off information earlier this morning.

## 2024-10-29 NOTE — PROGRESS NOTES
Progress Note - Hospitalist   Name: Jaswant Saunders 29 y.o. male I MRN: [id number]  Unit/Bed#: -01 I Date of Admission: 10/28/2024   Date of Service: 10/29/2024 I Hospital Day: 1    Assessment & Plan  Recurrent spontaneous pneumothorax  Patient's third episode since 2017, twice on the right once on the left  No family history, no history of cystic fibrosis  Chest tube in place; no bubbling/air leak noted  Pulmonary consulted; appreciate input; will discuss with thoracic surgery as well  Repeat chest imaging today  May need to be txfr to [facility] for thoracic surgery eval    VTE Pharmacologic Prophylaxis: VTE Score: 3 Moderate Risk (Score 3-4) - Pharmacological DVT Prophylaxis Contraindicated. Sequential Compression Devices Ordered.    Mobility:   Basic Mobility Inpatient Raw Score: 18  JH-HLM Goal: 6: Walk 10 steps or more  JH-HLM Achieved: 7: Walk 25 feet or more  JH-HLM Goal achieved. Continue to encourage appropriate mobility.    Patient Centered Rounds: I performed bedside rounds with nursing staff today.   Discussions with Specialists or Other Care Team Provider: pulmonology    Education and Discussions with Family / Patient: Patient declined call to .     Current Length of Stay: 1 day(s)  Current Patient Status: Inpatient   Certification Statement: The patient will continue to require additional inpatient hospital stay due to plan noted above- potential txfr  Discharge Plan: Anticipate discharge in 24-48 hrs to dc home vs txfr to [facility]    Code Status: Level 1 - Full Code    Subjective   Currently denies SOB, chest pain. Only complains of mild discomfort associated with the chest tube in place. No overnight acute events noted. Patient is understanding of plan.  All questions answered.     Objective :  Temp:  [97.8 °F (36.6 °C)-98.6 °F (37 °C)] 98.3 °F (36.8 °C)  HR:  [54-84] 56  BP: (114-131)/(62-96) 122/72  Resp:  [18-19] 18  SpO2:  [96 %-100 %] 97 %  O2 Device: None (Room air)  Nasal Cannula O2 Flow  Rate (L/min):  [2 L/min] 2 L/min    Body mass index is 22.92 kg/m².     Input and Output Summary (last 24 hours):     Intake/Output Summary (Last 24 hours) at 10/29/2024 0953  Last data filed at 10/29/2024 0900  Gross per 24 hour   Intake 1500 ml   Output 1800 ml   Net -300 ml       Physical Exam  Vitals and nursing note reviewed.   Constitutional:       General: He is not in acute distress.     Appearance: He is well-developed. He is ill-appearing (Chronically). He is not diaphoretic.   HENT:      Head: Normocephalic and atraumatic.      Nose: Nose normal.   Eyes:      General: No scleral icterus.     Conjunctiva/sclera: Conjunctivae normal.      Pupils: Pupils are equal, round, and reactive to light.   Neck:      Thyroid: No thyromegaly.   Cardiovascular:      Rate and Rhythm: Normal rate and regular rhythm.      Heart sounds: Normal heart sounds. No murmur heard.  Pulmonary:      Effort: Pulmonary effort is normal.      Breath sounds: Normal breath sounds. No wheezing, rhonchi or rales.      Comments: Chest tube in place  Abdominal:      General: Bowel sounds are normal. There is no distension.      Palpations: Abdomen is soft.      Tenderness: There is no abdominal tenderness.   Musculoskeletal:         General: No swelling, tenderness or deformity.      Cervical back: Neck supple.   Skin:     General: Skin is warm and dry.   Neurological:      Mental Status: He is alert and oriented to person, place, and time. Mental status is at baseline.   Psychiatric:         Behavior: Behavior normal.         Thought Content: Thought content normal.         Judgment: Judgment normal.           Lines/Drains:  Lines/Drains/Airways       Active Status       Name Placement date Placement time Site Days    Chest Tube Right 10/28/24  0946  --  1                            Lab Results: I have reviewed the following results:   Results from last 7 days   Lab Units 10/28/24  0742   WBC Thousand/uL 5.82   HEMOGLOBIN g/dL 16.8    HEMATOCRIT % 51.4*   PLATELETS Thousands/uL 186   SEGS PCT % 55   LYMPHO PCT % 36   MONO PCT % 7   EOS PCT % 2     Results from last 7 days   Lab Units 10/28/24  0742   SODIUM mmol/L 139   POTASSIUM mmol/L 3.8   CHLORIDE mmol/L 103   CO2 mmol/L 30   BUN mg/dL 21   CREATININE mg/dL 0.85   ANION GAP mmol/L 6   CALCIUM mg/dL 9.4   GLUCOSE RANDOM mg/dL 100     Results from last 7 days   Lab Units 10/28/24  0742   INR  0.88                   Recent Cultures (last 7 days):         Imaging Results Review: I reviewed radiology reports from this admission including: chest xray and CT chest.  Other Study Results Review: EKG was reviewed.     Last 24 Hours Medication List:     Current Facility-Administered Medications:     heparin (porcine) subcutaneous injection 5,000 Units, Q8H KENDALL    HYDROmorphone HCl (DILAUDID) injection 0.2 mg, Q2H PRN    ketorolac (TORADOL) injection 15 mg, Q6H PRN    oxyCODONE (ROXICODONE) split tablet 2.5 mg, Q4H PRN **OR** oxyCODONE (ROXICODONE) IR tablet 5 mg, Q4H PRN    Administrative Statements   Today, Patient Was Seen By: Aristides Oquendo DO      **Please Note: This note may have been constructed using a voice recognition system.**

## 2024-10-29 NOTE — QUICK NOTE
Patient utilizing tramadol for pain with chest tube. Pain remains 7/10 after given tramadol approx 1 hour ago. Will discontinue tramadol. Add pain regimen as follows    Oxy 2.5 mg q4h prn moderate pain  Oxy 5 mg q4h prn severe pain  IV hydromorphone 0.2 mg q2h breakthrough    Can adjust further if needed. Continue assess pain per nursing protocol

## 2024-10-29 NOTE — PROGRESS NOTES
Progress Note - Pulmonology   Name: Jaswant Saunders 29 y.o. male I MRN: [id number]  Unit/Bed#: -01 I Date of Admission: 10/28/2024   Date of Service: 10/29/2024 I Hospital Day: 1    Assessment & Plan  Recurrent spontaneous pneumothorax  Right sided spontaneous pneumothorax, previously had a right sided spontaneous pneumothorax in 2022, left-sided pneumothorax in 2017  Chest tube in place, no air leak  CXR this morning shows resolution of pneumothorax  Chest tube to be placed to water seal  CT chest today and will reach out to thoracic surgery    24 Hour Events : No events  Subjective : Patient resting in bed. He is breathing well today.     Objective :  Temp:  [98 °F (36.7 °C)-98.6 °F (37 °C)] 98.3 °F (36.8 °C)  HR:  [54-76] 56  BP: (122-131)/(63-96) 122/72  Resp:  [18] 18  SpO2:  [96 %-98 %] 97 %  O2 Device: None (Room air)  Nasal Cannula O2 Flow Rate (L/min):  [2 L/min] 2 L/min    Physical Exam  Vitals reviewed.   Constitutional:       General: He is not in acute distress.     Appearance: Normal appearance. He is well-developed. He is not ill-appearing.   HENT:      Head: Normocephalic and atraumatic.      Mouth/Throat:      Pharynx: Oropharynx is clear.   Eyes:      Conjunctiva/sclera: Conjunctivae normal.   Cardiovascular:      Rate and Rhythm: Normal rate and regular rhythm.   Pulmonary:      Effort: Pulmonary effort is normal.      Breath sounds: No decreased breath sounds, wheezing, rhonchi or rales.   Abdominal:      General: Abdomen is flat. There is no distension.   Musculoskeletal:         General: Normal range of motion.      Cervical back: Normal range of motion.      Right lower leg: No edema.      Left lower leg: No edema.   Skin:     General: Skin is warm and dry.   Neurological:      Mental Status: He is alert and oriented to person, place, and time.   Psychiatric:         Mood and Affect: Mood normal.         Behavior: Behavior normal.         Lab Results: I have reviewed the following results:    No new results in last 24 hours.  ABG: No new results in last 24 hours.    Imaging Results Review: I reviewed radiology reports from this admission including: chest xray.  Other Study Results Review: No additional pertinent studies reviewed.  PFT Results Reviewed: none available

## 2024-10-29 NOTE — ASSESSMENT & PLAN NOTE
Patient's third episode since 2017, twice on the right once on the left  No family history, no history of cystic fibrosis  Chest tube in place; no bubbling/air leak noted  Pulmonary consulted; appreciate input; will discuss with thoracic surgery as well  Repeat chest imaging today  May need to be txfr to [facility] for thoracic surgery eval

## 2024-10-30 ENCOUNTER — APPOINTMENT (INPATIENT)
Dept: RADIOLOGY | Facility: HOSPITAL | Age: 29
DRG: 143 | End: 2024-10-30
Payer: COMMERCIAL

## 2024-10-30 VITALS
SYSTOLIC BLOOD PRESSURE: 124 MMHG | HEIGHT: 66 IN | RESPIRATION RATE: 16 BRPM | DIASTOLIC BLOOD PRESSURE: 85 MMHG | OXYGEN SATURATION: 97 % | TEMPERATURE: 98.4 F | WEIGHT: 142.2 LBS | BODY MASS INDEX: 22.85 KG/M2 | HEART RATE: 83 BPM

## 2024-10-30 LAB
ALBUMIN SERPL BCG-MCNC: 4.5 G/DL (ref 3.5–5)
ALP SERPL-CCNC: 72 U/L (ref 34–104)
ALT SERPL W P-5'-P-CCNC: 16 U/L (ref 7–52)
ANION GAP SERPL CALCULATED.3IONS-SCNC: 5 MMOL/L (ref 4–13)
AST SERPL W P-5'-P-CCNC: 24 U/L (ref 13–39)
BASOPHILS # BLD AUTO: 0.02 THOUSANDS/ΜL (ref 0–0.1)
BASOPHILS NFR BLD AUTO: 0 % (ref 0–1)
BILIRUB SERPL-MCNC: 0.86 MG/DL (ref 0.2–1)
BUN SERPL-MCNC: 15 MG/DL (ref 5–25)
CALCIUM SERPL-MCNC: 9.4 MG/DL (ref 8.4–10.2)
CHLORIDE SERPL-SCNC: 105 MMOL/L (ref 96–108)
CO2 SERPL-SCNC: 29 MMOL/L (ref 21–32)
CREAT SERPL-MCNC: 0.99 MG/DL (ref 0.6–1.3)
EOSINOPHIL # BLD AUTO: 0.13 THOUSAND/ΜL (ref 0–0.61)
EOSINOPHIL NFR BLD AUTO: 2 % (ref 0–6)
ERYTHROCYTE [DISTWIDTH] IN BLOOD BY AUTOMATED COUNT: 11.8 % (ref 11.6–15.1)
GFR SERPL CREATININE-BSD FRML MDRD: 101 ML/MIN/1.73SQ M
GLUCOSE SERPL-MCNC: 101 MG/DL (ref 65–140)
HCT VFR BLD AUTO: 48.8 % (ref 36.5–49.3)
HGB BLD-MCNC: 16.3 G/DL (ref 12–17)
IMM GRANULOCYTES # BLD AUTO: 0.02 THOUSAND/UL (ref 0–0.2)
IMM GRANULOCYTES NFR BLD AUTO: 0 % (ref 0–2)
LYMPHOCYTES # BLD AUTO: 1.36 THOUSANDS/ΜL (ref 0.6–4.47)
LYMPHOCYTES NFR BLD AUTO: 24 % (ref 14–44)
MAGNESIUM SERPL-MCNC: 2.2 MG/DL (ref 1.9–2.7)
MCH RBC QN AUTO: 30.6 PG (ref 26.8–34.3)
MCHC RBC AUTO-ENTMCNC: 33.4 G/DL (ref 31.4–37.4)
MCV RBC AUTO: 92 FL (ref 82–98)
MONOCYTES # BLD AUTO: 0.48 THOUSAND/ΜL (ref 0.17–1.22)
MONOCYTES NFR BLD AUTO: 9 % (ref 4–12)
NEUTROPHILS # BLD AUTO: 3.61 THOUSANDS/ΜL (ref 1.85–7.62)
NEUTS SEG NFR BLD AUTO: 65 % (ref 43–75)
NRBC BLD AUTO-RTO: 0 /100 WBCS
PLATELET # BLD AUTO: 187 THOUSANDS/UL (ref 149–390)
PMV BLD AUTO: 9.3 FL (ref 8.9–12.7)
POTASSIUM SERPL-SCNC: 4.1 MMOL/L (ref 3.5–5.3)
PROT SERPL-MCNC: 7.1 G/DL (ref 6.4–8.4)
RBC # BLD AUTO: 5.33 MILLION/UL (ref 3.88–5.62)
SODIUM SERPL-SCNC: 139 MMOL/L (ref 135–147)
WBC # BLD AUTO: 5.62 THOUSAND/UL (ref 4.31–10.16)

## 2024-10-30 PROCEDURE — 99255 IP/OBS CONSLTJ NEW/EST HI 80: CPT | Performed by: PHYSICIAN ASSISTANT

## 2024-10-30 PROCEDURE — 83735 ASSAY OF MAGNESIUM: CPT | Performed by: INTERNAL MEDICINE

## 2024-10-30 PROCEDURE — 12020 TX SUPFC WND DEHSN SMPL CLSR: CPT | Performed by: PHYSICIAN ASSISTANT

## 2024-10-30 PROCEDURE — 12001 RPR S/N/AX/GEN/TRNK 2.5CM/<: CPT | Performed by: PHYSICIAN ASSISTANT

## 2024-10-30 PROCEDURE — 71045 X-RAY EXAM CHEST 1 VIEW: CPT

## 2024-10-30 PROCEDURE — 99232 SBSQ HOSP IP/OBS MODERATE 35: CPT | Performed by: INTERNAL MEDICINE

## 2024-10-30 PROCEDURE — 85025 COMPLETE CBC W/AUTO DIFF WBC: CPT | Performed by: INTERNAL MEDICINE

## 2024-10-30 PROCEDURE — 80053 COMPREHEN METABOLIC PANEL: CPT | Performed by: INTERNAL MEDICINE

## 2024-10-30 PROCEDURE — 99239 HOSP IP/OBS DSCHRG MGMT >30: CPT

## 2024-10-30 PROCEDURE — 0HQ5XZZ REPAIR CHEST SKIN, EXTERNAL APPROACH: ICD-10-PCS | Performed by: SURGERY

## 2024-10-30 PROCEDURE — NC001 PR NO CHARGE: Performed by: PHYSICIAN ASSISTANT

## 2024-10-30 PROCEDURE — 0WP9X0Z REMOVAL OF DRAINAGE DEVICE FROM RIGHT PLEURAL CAVITY, EXTERNAL APPROACH: ICD-10-PCS

## 2024-10-30 RX ORDER — GINSENG 100 MG
1 CAPSULE ORAL 2 TIMES DAILY
Status: DISCONTINUED | OUTPATIENT
Start: 2024-10-30 | End: 2024-10-30 | Stop reason: HOSPADM

## 2024-10-30 RX ORDER — LIDOCAINE HYDROCHLORIDE AND EPINEPHRINE 10; 10 MG/ML; UG/ML
10 INJECTION, SOLUTION INFILTRATION; PERINEURAL ONCE
Status: DISCONTINUED | OUTPATIENT
Start: 2024-10-30 | End: 2024-10-30

## 2024-10-30 RX ORDER — LIDOCAINE HYDROCHLORIDE AND EPINEPHRINE 10; 10 MG/ML; UG/ML
10 INJECTION, SOLUTION INFILTRATION; PERINEURAL ONCE
Status: COMPLETED | OUTPATIENT
Start: 2024-10-30 | End: 2024-10-30

## 2024-10-30 RX ADMIN — LIDOCAINE HYDROCHLORIDE AND EPINEPHRINE 10 ML: 10; 10 INJECTION, SOLUTION INFILTRATION; PERINEURAL at 14:00

## 2024-10-30 NOTE — CASE MANAGEMENT
Case Management Progress Note    Patient name Jaswant Saunders  Location /-01 MRN [id number]  : 1995 Date 10/30/2024       LOS (days): 2  Geometric Mean LOS (GMLOS) (days): 2.2  Days to GMLOS:0        OBJECTIVE:        Current admission status: Inpatient  Preferred Pharmacy:   [facility]/pharmacy #[id number] - [place] - [address]  [address]  [place] [address]  Phone: [phone] Fax: [phone]    Primary Care Provider: Nadia Meléndez MD    Primary Insurance: JO[employer] A [employer] PENDING  Secondary Insurance:     PROGRESS NOTE:  Discussed patient's case in interdisciplinary rounds this morning with [facility] provider. Patient is medically cleared for discharge- chest tube pulled and pulmonary cleared patient. Patient has no CM needs. CM will continue to follow.

## 2024-10-30 NOTE — PLAN OF CARE
Problem: PAIN - ADULT  Goal: Verbalizes/displays adequate comfort level or baseline comfort level  Description: Interventions:  - Encourage patient to monitor pain and request assistance  - Assess pain using appropriate pain scale  - Administer analgesics based on type and severity of pain and evaluate response  - Implement non-pharmacological measures as appropriate and evaluate response  - Consider cultural and social influences on pain and pain management  - Notify physician/advanced practitioner if interventions unsuccessful or patient reports new pain  Outcome: Progressing     Problem: INFECTION - ADULT  Goal: Absence or prevention of progression during hospitalization  Description: INTERVENTIONS:  - Assess and monitor for signs and symptoms of infection  - Monitor lab/diagnostic results  - Monitor all insertion sites, i.e. indwelling lines, tubes, and drains  - Monitor endotracheal if appropriate and nasal secretions for changes in amount and color  - [place] appropriate cooling/warming therapies per order  - Administer medications as ordered  - Instruct and encourage patient and family to use good hand hygiene technique  - Identify and instruct in appropriate isolation precautions for identified infection/condition  Outcome: Progressing

## 2024-10-30 NOTE — CONSULTS
Consult- General Surgery   Jaswant Saunders 29 y.o. male MRN: [id number]  Unit/Bed#: -01 Encounter: [id number]      Assessment & Plan     Jaswant Saunders is a 29 y.o. male    Recurrent spontaneous pneumothorax s/p chest tube placement now removed bedside by Pulmonology  General Surgery consulted for placement of suture in chest tube incision    On evaluation, the patient has an approximately 2cm open laceration along his right rib cage s/p chest tube removal. Two 4-0 Nylon sutures placed. Please see procedure note for further details.   Apply antibiotic ointment over incision twice a day for 48 hours. OK to leave to air afterwards   Follow up with Pulmonology in one week for suture removal as an outpatient   This was discussed with the patient who expresses understanding       History of Present Illness     HPI:  Jaswant Saunders is a 29 y.o. male who presents with a right sided spontaneous pneumothorax which was treated with a bedside chest tube placement in the ED. Follow up CXR the next day showed resolution of the pneumothorax and the chest tube was subsequently placed to water seal and removed the following day after repeat CXR showed no further recollection of pneumothorax. Upon chest tube removal, incision was noted to be fairly large and steristrips were noted to be inadequate in obtaining appropriate skin approximation and General Surgery was subsequently consulted.     Review of Systems   Constitutional:  Negative for activity change, chills, fatigue, fever and unexpected weight change.   HENT:  Negative for congestion, ear pain, sore throat, tinnitus, trouble swallowing and voice change.    Eyes:  Negative for photophobia, pain, discharge and visual disturbance.   Respiratory:  Negative for apnea, cough, chest tightness, shortness of breath and wheezing.    Cardiovascular:  Negative for chest pain, palpitations and leg swelling.   Gastrointestinal:  Negative for abdominal distention, abdominal pain, diarrhea,  "nausea and vomiting.   Endocrine: Negative for cold intolerance, polyphagia and polyuria.   Genitourinary:  Negative for decreased urine volume, difficulty urinating, dysuria, flank pain, frequency, hematuria and urgency.   Musculoskeletal:  Negative for arthralgias, back pain, joint swelling and myalgias.   Skin:  Negative for color change, pallor, rash and wound.   Neurological:  Negative for dizziness, seizures, syncope, facial asymmetry, light-headedness and numbness.   Psychiatric/Behavioral:  Negative for agitation and behavioral problems.    All other systems reviewed and are negative.      Historical Information   History reviewed. No pertinent past medical history.  Past Surgical History:   Procedure Laterality Date    ABDOMINAL WALL SURGERY N/A 2018    Procedure: Intermediate  CLOSURE WOUND  2 cm left neck;  Surgeon: Berto Chavez MD;  Location: [facility];  Service: General    FACIAL/NECK BIOPSY Left 2018    Procedure: NECK MASS EXCISION BIOPSY;  Surgeon: Berto Chavez MD;  Location: [facility];  Service: General    KNEE SURGERY       Social History   Social History     Substance and Sexual Activity   Alcohol Use Yes    Alcohol/week: 2.0 standard drinks of alcohol    Types: 2 Cans of beer per week    Comment: socially      Social History     Substance and Sexual Activity   Drug Use Yes    Types: Marijuana    Comment: \"couple weeks ago\"     Social History     Tobacco Use   Smoking Status Former    Current packs/day: 0.00    Types: Cigarettes    Quit date:     Years since quittin.8   Smokeless Tobacco Never     Family History:   Family History   Family history unknown: Yes       Meds/Allergies   PTA meds:   Prior to Admission Medications   Prescriptions Last Dose Informant Patient Reported? Taking?   acetaminophen (TYLENOL) 500 mg tablet Not Taking Self No No   Sig: Take 1 tablet (500 mg total) by mouth every 6 (six) hours as needed for mild pain or moderate pain   Patient not " "taking: Reported on 5/6/2024   ibuprofen (MOTRIN) 800 mg tablet Not Taking Self No No   Sig: Take 1 tablet (800 mg total) by mouth every 8 (eight) hours as needed for mild pain or moderate pain   Patient not taking: Reported on 5/6/2024      Facility-Administered Medications: None     No Known Allergies    Objective   First Vitals:   Blood Pressure: 156/89 (10/28/24 0634)  Pulse: 71 (10/28/24 0634)  Temperature: 97.6 °F (36.4 °C) (10/28/24 0634)  Temp Source: Oral (10/28/24 0634)  Respirations: 20 (10/28/24 0634)  Height: 5' 6\" (167.6 cm) (10/28/24 0634)  Weight - Scale: 64.4 kg (142 lb) (10/28/24 0634)  SpO2: 98 % (10/28/24 0634)    Current Vitals:   Blood Pressure: 127/75 (10/30/24 0726)  Pulse: 55 (10/30/24 0726)  Temperature: 98.4 °F (36.9 °C) (10/30/24 0726)  Temp Source: Oral (10/30/24 0726)  Respirations: 16 (10/30/24 0726)  Height: 5' 6\" (167.6 cm) (10/29/24 2050)  Weight - Scale: 64.5 kg (142 lb 3.2 oz) (10/29/24 2050)  SpO2: 99 % (10/30/24 0900)      Intake/Output Summary (Last 24 hours) at 10/30/2024 1414  Last data filed at 10/30/2024 0819  Gross per 24 hour   Intake 960 ml   Output 5 ml   Net 955 ml       Invasive Devices       Peripheral Intravenous Line  Duration             Peripheral IV 10/28/24 Distal;Left;Upper;Ventral (anterior) Arm 2 days              Drain  Duration             Chest Tube 1 Right Midaxillary 994 days    Chest Tube Right 2 days                    Physical Exam  Constitutional:       General: He is not in acute distress.     Appearance: He is well-developed. He is not diaphoretic.   HENT:      Head: Normocephalic and atraumatic.      Right Ear: External ear normal.      Left Ear: External ear normal.      Mouth/Throat:      Pharynx: No oropharyngeal exudate.   Eyes:      Conjunctiva/sclera: Conjunctivae normal.      Pupils: Pupils are equal, round, and reactive to light.   Neck:      Thyroid: No thyromegaly.      Trachea: No tracheal deviation.   Cardiovascular:      Rate and " Rhythm: Normal rate and regular rhythm.      Heart sounds: Normal heart sounds. No murmur heard.     No friction rub. No gallop.   Pulmonary:      Effort: Pulmonary effort is normal. No respiratory distress.      Breath sounds: Normal breath sounds. No wheezing or rales.   Chest:      Chest wall: No tenderness.   Abdominal:      General: Bowel sounds are normal. There is no distension.      Palpations: Abdomen is soft.      Tenderness: There is no abdominal tenderness. There is no guarding or rebound.   Musculoskeletal:         General: No tenderness or deformity. Normal range of motion.      Cervical back: Normal range of motion and neck supple.   Skin:     General: Skin is warm and dry.      Coloration: Skin is not pale.      Findings: No erythema or rash.      Comments: 2cm laceration along right flank/rib cage with exposed subcutaneous tissue    Neurological:      Mental Status: He is alert and oriented to person, place, and time.   Psychiatric:         Behavior: Behavior normal.         Thought Content: Thought content normal.         Lab Results: I have personally reviewed pertinent lab results.    Recent Results (from the past 36 hour(s))   CBC and differential    Collection Time: 10/30/24  5:03 AM   Result Value Ref Range    WBC 5.62 4.31 - 10.16 Thousand/uL    RBC 5.33 3.88 - 5.62 Million/uL    Hemoglobin 16.3 12.0 - 17.0 g/dL    Hematocrit 48.8 36.5 - 49.3 %    MCV 92 82 - 98 fL    MCH 30.6 26.8 - 34.3 pg    MCHC 33.4 31.4 - 37.4 g/dL    RDW 11.8 11.6 - 15.1 %    MPV 9.3 8.9 - 12.7 fL    Platelets 187 149 - 390 Thousands/uL    nRBC 0 /100 WBCs    Segmented % 65 43 - 75 %    Immature Grans % 0 0 - 2 %    Lymphocytes % 24 14 - 44 %    Monocytes % 9 4 - 12 %    Eosinophils Relative 2 0 - 6 %    Basophils Relative 0 0 - 1 %    Absolute Neutrophils 3.61 1.85 - 7.62 Thousands/µL    Absolute Immature Grans 0.02 0.00 - 0.20 Thousand/uL    Absolute Lymphocytes 1.36 0.60 - 4.47 Thousands/µL    Absolute Monocytes  0.48 0.17 - 1.22 Thousand/µL    Eosinophils Absolute 0.13 0.00 - 0.61 Thousand/µL    Basophils Absolute 0.02 0.00 - 0.10 Thousands/µL   Comprehensive metabolic panel    Collection Time: 10/30/24  5:03 AM   Result Value Ref Range    Sodium 139 135 - 147 mmol/L    Potassium 4.1 3.5 - 5.3 mmol/L    Chloride 105 96 - 108 mmol/L    CO2 29 21 - 32 mmol/L    ANION GAP 5 4 - 13 mmol/L    BUN 15 5 - 25 mg/dL    Creatinine 0.99 0.60 - 1.30 mg/dL    Glucose 101 65 - 140 mg/dL    Calcium 9.4 8.4 - 10.2 mg/dL    AST 24 13 - 39 U/L    ALT 16 7 - 52 U/L    Alkaline Phosphatase 72 34 - 104 U/L    Total Protein 7.1 6.4 - 8.4 g/dL    Albumin 4.5 3.5 - 5.0 g/dL    Total Bilirubin 0.86 0.20 - 1.00 mg/dL    eGFR 101 ml/min/1.73sq m   Magnesium    Collection Time: 10/30/24  5:03 AM   Result Value Ref Range    Magnesium 2.2 1.9 - 2.7 mg/dL     Imaging: Results Review Statement: No pertinent imaging studies reviewed.  XR chest portable    Result Date: 10/30/2024  Impression: No radiographic evidence of pneumothorax. Resident: RACHELE Luis I, the attending radiologist, have reviewed the images and agree with the final report above. Workstation performed: [id number]     CT chest wo contrast    Result Date: 10/29/2024  Impression: Chest tube in superior lateral right pleural space with trace right pneumothorax. Small bilateral blebs and bullae. Workstation performed: [id number]     XR chest portable    Result Date: 10/29/2024  Impression: Previously seen trace right apical pneumothorax no longer radiographically evident. Resident: Ayden Nelson I, the attending radiologist, have reviewed the images and agree with the final report above. Workstation performed: [id number]     XR chest 1 view portable    Result Date: 10/28/2024  Impression: Persistent large right-sided pneumothorax, without a significant change. Workstation performed: [id number]     XR chest 1 view portable    Result Date: 10/28/2024  Impression: Right-sided pneumothorax  markedly diminished in size with only a minimal right apical component seen on this study. Right chest tube not appreciated on the field-of-view of this examination. Correlate with clinical findings/scenario. The examination demonstrates a significant  finding and was documented as such in [facility] for liaison and referring practitioner immediate notification. Workstation performed: [id number]     XR chest 2 views    Result Date: 10/28/2024  Impression: Large right-sided pneumothorax. Midline trachea. This report is in agreement with the preliminary interpretation. Workstation performed: [id number]     XR chest portable    Result Date: 10/28/2024  Impression: A right-sided chest tube appears to terminate in the chest wall with adequate entering the pleural space. A large right pneumothorax is unchanged since a chest radiograph from earlier in the morning. I have reviewed this radiograph with the Fortino Lee physician assistant in the emergency department. Workstation performed: [id number]       EKG, Pathology, and Other Studies: Results Review Statement: No pertinent imaging studies reviewed.

## 2024-10-30 NOTE — ASSESSMENT & PLAN NOTE
Right sided spontaneous pneumothorax, previously had a right sided spontaneous pneumothorax in 2022, left-sided pneumothorax in 2017  Chest tube in place, no air leak  CT chest yesterday shows tiny apical pneumothorax, there are small bilateral blebs and bullae  Will repeat CXR this morning, if still without PTX, will plan to clamp and remove chest tube  Will need outpatient evaluation with CT surgery for bleb resection and pleurodesis

## 2024-10-30 NOTE — PROCEDURES
Universal Protocol:  procedure performed by consultantConsent: Verbal consent obtained.  Consent given by: patient  Patient understanding: patient states understanding of the procedure being performed  Patient identity confirmed: arm band  Laceration repair    Date/Time: 10/30/2024 2:25 PM    Performed by: Tatyana Melendez PA-C  Authorized by: Tatyana Melendez PA-C  Body area: trunk  Location details: chest  Laceration length: 2 cm  Foreign bodies: no foreign bodies  Tendon involvement: none  Nerve involvement: none  Vascular damage: no  Anesthesia: local infiltration    Anesthesia:  Local Anesthetic: lidocaine 1% without epinephrine  Anesthetic total: 5 mL    Sedation:  Patient sedated: no      Wound Dehiscence:  Superficial Wound Dehiscence: simple closure      Procedure Details:  Preparation: Patient was prepped and draped in the usual sterile fashion.  Irrigation solution: saline  Amount of cleaning: standard  Skin closure: 4-0 nylon  Number of sutures: 2  Technique: simple  Approximation: close  Approximation difficulty: simple  Dressing: antibiotic ointment

## 2024-10-30 NOTE — DISCHARGE INSTR - AVS FIRST PAGE
Please follow up with:  PCP in 1-2 weeks  Thoracic surgery - call to make appt  Pulmonology     Suture Care:  - Keep the incision clean and dry the first two days, do not allow it to get wet during this time. After two days, you can get the incision wet in the shower and gently pat dry afterwards. Do not submerge the incision underwater until the sutures are removed  - Apply antibiotic ointment over the sutures/incision twice a day for the first two days. After two days, you no longer need to do this  - You may cover the sutures with a bandaid for comfort and to prevent the sutures from catching  - Follow up in the Pulmonology office in 1 week for suture removal

## 2024-10-30 NOTE — PLAN OF CARE
Problem: RESPIRATORY - ADULT  Goal: Achieves optimal ventilation and oxygenation  Description: INTERVENTIONS:  - Assess for changes in respiratory status  - Assess for changes in mentation and behavior  - Position to facilitate oxygenation and minimize respiratory effort  - Oxygen administered by appropriate delivery if ordered  - Initiate smoking cessation education as indicated  - Encourage broncho-pulmonary hygiene including cough, deep breathe, Incentive Spirometry  - Assess the need for suctioning and aspirate as needed  - Assess and instruct to report SOB or any respiratory difficulty  - Respiratory Therapy support as indicated  Outcome: Progressing     Problem: PAIN - ADULT  Goal: Verbalizes/displays adequate comfort level or baseline comfort level  Description: Interventions:  - Encourage patient to monitor pain and request assistance  - Assess pain using appropriate pain scale  - Administer analgesics based on type and severity of pain and evaluate response  - Implement non-pharmacological measures as appropriate and evaluate response  - Consider cultural and social influences on pain and pain management  - Notify physician/advanced practitioner if interventions unsuccessful or patient reports new pain  Outcome: Progressing     Problem: INFECTION - ADULT  Goal: Absence or prevention of progression during hospitalization  Description: INTERVENTIONS:  - Assess and monitor for signs and symptoms of infection  - Monitor lab/diagnostic results  - Monitor all insertion sites, i.e. indwelling lines, tubes, and drains  - Monitor endotracheal if appropriate and nasal secretions for changes in amount and color  - [place] appropriate cooling/warming therapies per order  - Administer medications as ordered  - Instruct and encourage patient and family to use good hand hygiene technique  - Identify and instruct in appropriate isolation precautions for identified infection/condition  Outcome: Progressing     Problem:  SAFETY ADULT  Goal: Patient will remain free of falls  Description: INTERVENTIONS:  - Educate patient/family on patient safety including physical limitations  - Instruct patient to call for assistance with activity   - Consult OT/PT to assist with strengthening/mobility   - Keep Call bell within reach  - Keep bed low and locked with side rails adjusted as appropriate  - Keep care items and personal belongings within reach  - Initiate and maintain comfort rounds  - Apply yellow socks and bracelet for high fall risk patients  - Consider moving patient to room near nurses station  Outcome: Progressing  Goal: Maintain or return to baseline ADL function  Description: INTERVENTIONS:  -  Assess patient's ability to carry out ADLs; assess patient's baseline for ADL function and identify physical deficits which impact ability to perform ADLs (bathing, care of mouth/teeth, toileting, grooming, dressing, etc.)  - Assess/evaluate cause of self-care deficits   - Assess range of motion  - Assess patient's mobility; develop plan if impaired  - Assess patient's need for assistive devices and provide as appropriate  - Encourage maximum independence but intervene and supervise when necessary  - Involve family in performance of ADLs  - Assess for home care needs following discharge   - Consider OT consult to assist with ADL evaluation and planning for discharge  - Provide patient education as appropriate  Outcome: Progressing  Goal: Maintains/Returns to pre admission functional level  Description: INTERVENTIONS:  - Perform AM-PAC 6 Click Basic Mobility/ Daily Activity assessment daily.  - Set and communicate daily mobility goal to care team and patient/family/caregiver.   - Collaborate with rehabilitation services on mobility goals if consulted  - Out of bed for toileting  - Record patient progress and toleration of activity level   Outcome: Progressing     Problem: DISCHARGE PLANNING  Goal: Discharge to home or other facility with  appropriate resources  Description: INTERVENTIONS:  - Identify barriers to discharge w/patient and caregiver  - Arrange for needed discharge resources and transportation as appropriate  - Identify discharge learning needs (meds, wound care, etc.)  - Arrange for interpretive services to assist at discharge as needed  - Refer to Case Management Department for coordinating discharge planning if the patient needs post-hospital services based on physician/advanced practitioner order or complex needs related to functional status, cognitive ability, or social support system  Outcome: Progressing     Problem: Knowledge Deficit  Goal: Patient/family/caregiver demonstrates understanding of disease process, treatment plan, medications, and discharge instructions  Description: Complete learning assessment and assess knowledge base.  Interventions:  - Provide teaching at level of understanding  - Provide teaching via preferred learning methods  Outcome: Progressing     Problem: Prexisting or High Potential for Compromised Skin Integrity  Goal: Skin integrity is maintained or improved  Description: INTERVENTIONS:  - Identify patients at risk for skin breakdown  - Assess and monitor skin integrity  - Assess and monitor nutrition and hydration status  - Monitor labs   - Assess for incontinence   - Turn and reposition patient  - Assist with mobility/ambulation  - Relieve pressure over bony prominences  - Avoid friction and shearing  - Provide appropriate hygiene as needed including keeping skin clean and dry  - Evaluate need for skin moisturizer/barrier cream  - Collaborate with interdisciplinary team   - Patient/family teaching  - Consider wound care consult   Outcome: Progressing

## 2024-10-30 NOTE — PROGRESS NOTES
Progress Note - Pulmonology   Name: Jaswant Saunders 29 y.o. male I MRN: [id number]  Unit/Bed#: -01 I Date of Admission: 10/28/2024   Date of Service: 10/30/2024 I Hospital Day: 2    Assessment & Plan  Recurrent spontaneous pneumothorax  Right sided spontaneous pneumothorax, previously had a right sided spontaneous pneumothorax in 2022, left-sided pneumothorax in 2017  Chest tube in place, no air leak  CT chest yesterday shows tiny apical pneumothorax, there are small bilateral blebs and bullae  Will repeat CXR this morning, if still without PTX, will plan to clamp and remove chest tube  Will need outpatient evaluation with CT surgery for bleb resection and pleurodesis      24 Hour Events : No events  Subjective : Patient resting in bed.  Feeling well without any shortness of breath or chest pain.    Objective :  Temp:  [97.5 °F (36.4 °C)-98.7 °F (37.1 °C)] 98.4 °F (36.9 °C)  HR:  [55-85] 55  BP: (125-127)/(74-80) 127/75  Resp:  [16-18] 16  SpO2:  [96 %-99 %] 99 %  O2 Device: None (Room air)    Physical Exam  Vitals reviewed.   Constitutional:       General: He is not in acute distress.     Appearance: Normal appearance. He is not ill-appearing.   HENT:      Head: Normocephalic and atraumatic.      Mouth/Throat:      Pharynx: Oropharynx is clear.   Eyes:      Conjunctiva/sclera: Conjunctivae normal.   Cardiovascular:      Rate and Rhythm: Normal rate and regular rhythm.   Pulmonary:      Effort: Pulmonary effort is normal.      Breath sounds: No decreased breath sounds, wheezing, rhonchi or rales.      Comments: Chest tube in place, no airleak  Abdominal:      General: Abdomen is flat. There is no distension.   Musculoskeletal:         General: Normal range of motion.      Cervical back: Normal range of motion.      Right lower leg: No edema.      Left lower leg: No edema.   Skin:     General: Skin is warm and dry.   Neurological:      Mental Status: He is alert and oriented to person, place, and time.    Psychiatric:         Mood and Affect: Mood normal.         Behavior: Behavior normal.           Lab Results: I have reviewed the following results:   .     10/30/24  0503   WBC 5.62   HGB 16.3   HCT 48.8      SODIUM 139   K 4.1      CO2 29   BUN 15   CREATININE 0.99   GLUC 101   MG 2.2   AST 24   ALT 16   ALB 4.5   TBILI 0.86   ALKPHOS 72     ABG: No new results in last 24 hours.    Imaging Results Review: I reviewed radiology reports from this admission including: chest xray and CT chest.  Other Study Results Review: No additional pertinent studies reviewed.  PFT Results Reviewed: none available

## 2024-10-31 NOTE — DISCHARGE SUMMARY
Discharge Summary - Internal Medicine   Name: Jaswant Saunders 29 y.o. male I MRN: [id number]  Unit/Bed#: -01 I Date of Admission: 10/28/2024   Date of Service: 10/30/2024 I Hospital Day: 2    Medical Problems       Resolved Problems  Date Reviewed: 10/28/2024   None     * Recurrent spontaneous pneumothorax  Assessment & Plan  Patient's third episode since 2017, twice on the right once on the left  No family history, no history of cystic fibrosis, A1AT neg  Chest tube in place; no bubbling/air leak noted  Pulmonary consulted  Repeat chest imaging today WNL   Chest tube removed 10/30  OP thoracic surgery eval for possible bleb resection vs pleurodesis  OP pulmonology f/u and removal of sutures        Discharging Physician / Practitioner: Emilie Soyeon Kim, MD  PCP: Nadia Meléndez MD  Admission Date:   Admission Orders (From admission, onward)       Ordered        10/28/24 0951  INPATIENT ADMISSION  Once                          Discharge Date: 10/30/24    Consultations During Hospital Stay:  pulmonology    Procedures Performed:   Chest tube placement & removal  Suture placed    Significant Findings / Test Results:   Large R pneumothorax with midline trachea  CT lung with Small bilateral blebs and bullae     Incidental Findings:   none     Test Results Pending at Discharge (will require follow up):   Blebs on CT -- follow up with thoracic surgery  Pulmonology follow up - suture removal  PCP in 1-2 weeks     Outpatient Tests Requested:  no    Complications:  no    Reason for Admission: dyspnea 2/2 PTX    Hospital Course:   Jaswant Saunders is a 29 y.o. male patient who originally presented to the hospital on 10/28/2024 due to dyspnea with large right-sided pneumothorax with midline trachea requiring chest tube placement by pulmonology.  No further air leaks and chest tube was pulled on 10/30, and sutures placed on chest tube wound by surgery.  CT scan showed small bilateral blebs and bullae and in setting of prior  "spontaneous pneumothorax on the right in 2022 and left-sided pneumothorax in 2017, patient instructed to follow-up with thoracic surgery for resection of blebs versus pleurodesis outpatient.        Please see above list of diagnoses and related plan for additional information.     Condition at Discharge: good    Discharge Day Visit / Exam:   Subjective: Patient denies any pain or dyspnea on day of discharge.  Tolerated removal of chest tube as well as placement of sutures well.  Denies any fevers or chills.  Vitals: Blood Pressure: 124/85 (10/30/24 1432)  Pulse: 83 (10/30/24 1432)  Temperature: 98.4 °F (36.9 °C) (10/30/24 0726)  Temp Source: Oral (10/30/24 0726)  Respirations: 16 (10/30/24 0726)  Height: 5' 6\" (167.6 cm) (10/29/24 2050)  Weight - Scale: 64.5 kg (142 lb 3.2 oz) (10/29/24 2050)  SpO2: 97 % (10/30/24 1432)  Physical Exam  Vitals and nursing note reviewed.   Constitutional:       General: He is not in acute distress.     Appearance: He is well-developed.   HENT:      Head: Normocephalic and atraumatic.   Eyes:      Conjunctiva/sclera: Conjunctivae normal.   Cardiovascular:      Rate and Rhythm: Normal rate and regular rhythm.      Heart sounds: No murmur heard.  Pulmonary:      Effort: Pulmonary effort is normal. No respiratory distress.      Breath sounds: Normal breath sounds.      Comments: Chest tube removed  Sutures intact  Abdominal:      Palpations: Abdomen is soft.      Tenderness: There is no abdominal tenderness.   Musculoskeletal:         General: No swelling.      Cervical back: Neck supple.   Skin:     General: Skin is warm and dry.      Capillary Refill: Capillary refill takes less than 2 seconds.   Neurological:      Mental Status: He is alert.   Psychiatric:         Mood and Affect: Mood normal.          Discussion with Family: Updated  (significant other) at bedside.    Discharge instructions/Information to patient and family:   See after visit summary for information " provided to patient and family.      Provisions for Follow-Up Care:  See after visit summary for information related to follow-up care and any pertinent home health orders.      Mobility at time of Discharge:   Basic Mobility Inpatient Raw Score: 18  JH-HLM Goal: 6: Walk 10 steps or more  JH-HLM Achieved: 7: Walk 25 feet or more  HLM Goal achieved. Continue to encourage appropriate mobility.     Disposition:   Home    Planned Readmission: no    Discharge Medications:  See after visit summary for reconciled discharge medications provided to patient and/or family.      Administrative Statements   Discharge Statement:  I have spent a total time of 45 minutes in caring for this patient on the day of the visit/encounter. >30 minutes of time was spent on: Patient and family education.    **Please Note: This note may have been constructed using a voice recognition system**

## 2024-10-31 NOTE — ASSESSMENT & PLAN NOTE
Patient's third episode since 2017, twice on the right once on the left  No family history, no history of cystic fibrosis, A1AT neg  Chest tube in place; no bubbling/air leak noted  Pulmonary consulted  Repeat chest imaging today WNL   Chest tube removed 10/30  OP thoracic surgery eval for possible bleb resection vs pleurodesis  OP pulmonology f/u and removal of sutures

## 2024-11-04 ENCOUNTER — TELEPHONE (OUTPATIENT)
Age: 29
End: 2024-11-04

## 2024-11-04 NOTE — TELEPHONE ENCOUNTER
Spoke with pt regarding needing insurance information - pt states he has [employer] insurance that began 11/1/2024 and he will bring it into office to have information added and updated

## 2024-11-08 ENCOUNTER — OFFICE VISIT (OUTPATIENT)
Age: 29
End: 2024-11-08
Payer: COMMERCIAL

## 2024-11-08 VITALS
HEART RATE: 65 BPM | TEMPERATURE: 98.9 F | DIASTOLIC BLOOD PRESSURE: 68 MMHG | SYSTOLIC BLOOD PRESSURE: 118 MMHG | BODY MASS INDEX: 22.02 KG/M2 | HEIGHT: 66 IN | RESPIRATION RATE: 16 BRPM | WEIGHT: 137 LBS | OXYGEN SATURATION: 98 %

## 2024-11-08 DIAGNOSIS — J93.9 PNEUMOTHORAX ON RIGHT: Primary | ICD-10-CM

## 2024-11-08 DIAGNOSIS — J43.9 BLEB, LUNG (HCC): ICD-10-CM

## 2024-11-08 PROCEDURE — 99213 OFFICE O/P EST LOW 20 MIN: CPT | Performed by: PHYSICIAN ASSISTANT

## 2024-11-08 NOTE — PROGRESS NOTES
"Assessment/Plan:   Diagnoses and all orders for this visit:    Pneumothorax on right  -     Ambulatory referral to Pulmonology    Bleb, lung (HCC)      Patient is here today for follow-up.  Recent hospitalization for right sided pneumothorax.  Chest tube was placed and pneumothorax did resolve.  Sutures were removed today from the chest tube site.  He has previous right sided pneumothorax in 2022 and left-sided pneumothorax in 2017.  Does have blebs on CT scan.  He is scheduled to see thoracic surgery for consultation next week.  He can follow-up with us as needed.        Suture removal    Date/Time: 11/8/2024 12:30 PM    Performed by: Oren Davis PA-C  Authorized by: Oren Davis PA-C  Universal Protocol:  procedure performed by consultant    Patient location:  Clinic  Location:     Laterality:  Right    Location:  Trunk    Trunk location:  Chest  Procedure details:     Tools used:  Suture removal kit    Wound appearance:  No sign(s) of infection, nontender and clean    Number of sutures removed:  1  Post-procedure details:     Post-removal:  No dressing applied    Patient tolerance of procedure:  Tolerated well, no immediate complications       No follow-ups on file.  All questions are answered to the patient's satisfaction and understanding.  He verbalizes understanding.  He is encouraged to call with any further questions or concerns.    Portions of the record may have been created with voice recognition software.  Occasional wrong word or \"sound a like\" substitutions may have occurred due to the inherent limitations of voice recognition software.  Read the chart carefully and recognize, using context, where substitutions have occurred.    Electronically Signed by Oren Davis PA-C    ______________________________________________________________________    Chief Complaint:   Chief Complaint   Patient presents with    Follow-up       Patient ID: Jaswant is a 29 y.o. y.o. male has no past medical " history on file.    11/8/2024  Patient presents today for follow-up visit.  Patient is a 29-year-old male with past medical history of left-sided pneumothorax in 2017, right-sided pneumothorax in 2022.  He was hospitalized last week with right sided pneumothorax and had a chest tube placed.  Pneumothorax did resolve and chest tube was removed.    He is here today for follow-up.  He is doing well with his breathing, some slight discomfort at the incision of the chest tube due to the sutures.  Otherwise feeling well.          Review of Systems   Constitutional: Negative.    HENT: Negative.     Respiratory: Negative.     Cardiovascular: Negative.    Gastrointestinal: Negative.    Genitourinary: Negative.    Musculoskeletal: Negative.    Skin: Negative.    Allergic/Immunologic: Negative.    Neurological: Negative.    Psychiatric/Behavioral: Negative.         Smoking history: He reports that he quit smoking about 12 years ago. His smoking use included cigarettes. He has never used smokeless tobacco.    The following portions of the patient's history were reviewed and updated as appropriate: allergies, current medications, past family history, past medical history, past social history, past surgical history, and problem list.    Immunization History   Administered Date(s) Administered    COVID-19 [employer] ([employer]) vaccine 0.5 mL 04/29/2021, 04/05/2022    Tdap 12/31/2016     Current Outpatient Medications   Medication Sig Dispense Refill    acetaminophen (TYLENOL) 500 mg tablet Take 1 tablet (500 mg total) by mouth every 6 (six) hours as needed for mild pain or moderate pain 30 tablet 0    ibuprofen (MOTRIN) 800 mg tablet Take 1 tablet (800 mg total) by mouth every 8 (eight) hours as needed for mild pain or moderate pain 30 tablet 0     No current facility-administered medications for this visit.     Allergies: Patient has no known allergies.    Objective:  Vitals:    11/08/24 1238 11/08/24 1239   BP: 118/68    BP Location:  "Right arm    Patient Position: Sitting    Cuff Size: Large    Pulse: 65    Resp: 16    Temp:  98.9 °F (37.2 °C)   SpO2: 98% 98%   Weight: 62.1 kg (137 lb)    Height: 5' 6\" (1.676 m)    Oxygen Therapy  SpO2: 98 %  Oxygen Therapy: None (Room air)  .  Wt Readings from Last 3 Encounters:   11/08/24 62.1 kg (137 lb)   10/29/24 64.5 kg (142 lb 3.2 oz)   05/06/24 59.4 kg (131 lb)     Body mass index is 22.11 kg/m².    Physical Exam  Constitutional:       General: He is not in acute distress.     Appearance: Normal appearance. He is well-developed. He is not ill-appearing.   HENT:      Head: Normocephalic and atraumatic.      Mouth/Throat:      Pharynx: Oropharynx is clear.   Eyes:      Pupils: Pupils are equal, round, and reactive to light.   Cardiovascular:      Rate and Rhythm: Normal rate and regular rhythm.   Pulmonary:      Effort: Pulmonary effort is normal. No respiratory distress.      Breath sounds: Normal breath sounds. No decreased breath sounds, wheezing, rhonchi or rales.   Abdominal:      General: Abdomen is flat. There is no distension.   Musculoskeletal:         General: Normal range of motion.      Cervical back: Normal range of motion.      Right lower leg: No edema.      Left lower leg: No edema.   Skin:     General: Skin is warm and dry.      Findings: No rash.      Comments: Right sided chest wall incision dry and clean, 1 suture noted and was removed   Neurological:      Mental Status: He is alert and oriented to person, place, and time.   Psychiatric:         Mood and Affect: Mood normal.         Behavior: Behavior normal.         Lab Review:   Lab Results   Component Value Date    K 4.1 10/30/2024     10/30/2024    CO2 29 10/30/2024    BUN 15 10/30/2024    CREATININE 0.99 10/30/2024    CALCIUM 9.4 10/30/2024     Lab Results   Component Value Date    WBC 5.62 10/30/2024    HGB 16.3 10/30/2024    HCT 48.8 10/30/2024    MCV 92 10/30/2024     10/30/2024       Diagnostics:    Reviewed " hospital imaging - CT scan and CXRs  Office Spirometry Results:     ESS:    XR chest portable    Result Date: 10/30/2024  Narrative: XR CHEST PORTABLE INDICATION: Follow-up pneumothorax. COMPARISON: Chest radiograph 10/29/2024; CT chest 10/29/2024 FINDINGS: No radiographic evidence of pneumothorax. Small caliber right-sided chest tube in unchanged position. No significant subcutaneous emphysema. No pleural effusion. No focal consolidation. Normal cardiomediastinal silhouette. Bones are unremarkable for age. Normal upper abdomen.     Impression: No radiographic evidence of pneumothorax. Resident: RACHELE Luis I, the attending radiologist, have reviewed the images and agree with the final report above. Workstation performed: [id number]     CT chest wo contrast    Result Date: 10/29/2024  Narrative: CT CHEST WITHOUT IV CONTRAST INDICATION:   recurrent pneumothorax. Per my review of the medical record, admitted with spontaneous right pneumothorax. Previous spontaneous right pneumothorax in 2022, left pneumothorax 2017. COMPARISON: CXR 10/29/2024, 10/28/2024, chest CT 8/25/2017. TECHNIQUE: Chest CT without intravenous contrast.  Axial, sagittal, coronal 2D reformats and coronal MIPS from source data. Radiation dose length product (DLP):  265 mGy-cm . Radiation dose exposure minimized using iterative reconstruction and automated exposure control. FINDINGS: LUNGS: No acute disease. Small bilateral blebs and bullae. AIRWAYS: No significant filling defects. PLEURA: Chest tube in superior lateral right pleural space. Trace right pneumothorax. HEART/GREAT VESSELS:  Normal for age. MEDIASTINUM AND JUAN ANTONIO:  Unremarkable. CHEST WALL AND LOWER NECK: Minimal subcutaneous emphysema in the right lateral chest wall related to chest tube placement. UPPER ABDOMEN:  Unremarkable. OSSEOUS STRUCTURES: Normal for age.     Impression: Chest tube in superior lateral right pleural space with trace right pneumothorax. Small bilateral blebs and  bullae. Workstation performed: [id number]     XR chest portable    Result Date: 10/29/2024  Narrative: XR CHEST PORTABLE INDICATION: Follow-up pneumothorax. COMPARISON: Multiple recent chest radiographs; CTA chest 8/25/2017 FINDINGS: Right chest tube. Trace apical right pneumothorax no longer radiographically evident. Lucent bleb/bullae evident at the right apex. Minimal left apical fibronodular pleural thickening. No focal consolidation or effusion. Normal cardiomediastinal silhouette. Bones are unremarkable for age. Normal upper abdomen.     Impression: Previously seen trace right apical pneumothorax no longer radiographically evident. Resident: Ayden Nelson I, the attending radiologist, have reviewed the images and agree with the final report above. Workstation performed: [id number]     XR chest 1 view portable    Result Date: 10/28/2024  Narrative: XR CHEST PORTABLE INDICATION: pneumothorax. COMPARISON: Chest radiograph performed earlier the same day. FINDINGS: Large right-sided pneumothorax has not significantly changed since the previous examination. Left lung remains unremarkable. No significant effusion Normal cardiomediastinal silhouette. Bones are unremarkable for age. Normal upper abdomen.     Impression: Persistent large right-sided pneumothorax, without a significant change. Workstation performed: [id number]     XR chest 1 view portable    Result Date: 10/28/2024  Narrative: XR CHEST PORTABLE INDICATION: s/p chest tube. COMPARISON: Chest radiograph performed earlier the same day. FINDINGS: By history, a right-sided chest tube was placed, and the right-sided pneumothorax is markedly diminished in size, with only a minimal right apical component seen. The right chest tube however is not clearly visualized on the field of view of this exam. Normal cardiomediastinal silhouette. Bones are unremarkable for age. Normal upper abdomen.     Impression: Right-sided pneumothorax markedly diminished in size  with only a minimal right apical component seen on this study. Right chest tube not appreciated on the field-of-view of this examination. Correlate with clinical findings/scenario. The examination demonstrates a significant  finding and was documented as such in [facility] for liaison and referring practitioner immediate notification. Workstation performed: [id number]     XR chest 2 views    Result Date: 10/28/2024  Narrative: XR CHEST PA AND LATERAL INDICATION: sob. COMPARISON: 5/6/2024 FINDINGS: There is a large right pneumothorax. The trachea is midline. No effusions. Normal cardiomediastinal silhouette. Bones are unremarkable for age. Normal upper abdomen.     Impression: Large right-sided pneumothorax. Midline trachea. This report is in agreement with the preliminary interpretation. Workstation performed: [id number]     XR chest portable    Result Date: 10/28/2024  Narrative: XR CHEST PORTABLE INDICATION: chest tube. Right pneumothorax. COMPARISON: Portable chest from earlier today at 0829 hours. FINDINGS: In the interim, right-sided chest tube was placed. The tube lies in the right side of the chest wall and does not extend into the pleural space. Clear lungs. Persistent large right-sided pneumothorax, unchanged since the earlier exam. Normal cardiomediastinal silhouette. No mediastinal shift. Bones are unremarkable for age. Normal upper abdomen.     Impression: A right-sided chest tube appears to terminate in the chest wall with adequate entering the pleural space. A large right pneumothorax is unchanged since a chest radiograph from earlier in the morning. I have reviewed this radiograph with the Fortino Lee physician assistant in the emergency department. Workstation performed: [id number]

## 2024-11-19 ENCOUNTER — TELEPHONE (OUTPATIENT)
Dept: CARDIAC SURGERY | Facility: CLINIC | Age: 29
End: 2024-11-19

## 2024-11-19 ENCOUNTER — CONSULT (OUTPATIENT)
Dept: CARDIAC SURGERY | Facility: CLINIC | Age: 29
End: 2024-11-19
Payer: COMMERCIAL

## 2024-11-19 VITALS
DIASTOLIC BLOOD PRESSURE: 84 MMHG | WEIGHT: 136 LBS | HEART RATE: 87 BPM | OXYGEN SATURATION: 99 % | SYSTOLIC BLOOD PRESSURE: 108 MMHG | BODY MASS INDEX: 21.86 KG/M2 | HEIGHT: 66 IN

## 2024-11-19 DIAGNOSIS — J93.83 RECURRENT SPONTANEOUS PNEUMOTHORAX: ICD-10-CM

## 2024-11-19 PROCEDURE — 99205 OFFICE O/P NEW HI 60 MIN: CPT | Performed by: THORACIC SURGERY (CARDIOTHORACIC VASCULAR SURGERY)

## 2024-11-19 RX ORDER — GABAPENTIN 100 MG/1
600 CAPSULE ORAL ONCE
OUTPATIENT
Start: 2024-11-19 | End: 2024-11-19

## 2024-11-19 RX ORDER — ACETAMINOPHEN 325 MG/1
975 TABLET ORAL ONCE
OUTPATIENT
Start: 2024-11-19 | End: 2024-11-19

## 2024-11-19 NOTE — TELEPHONE ENCOUNTER
Patient had a thoracic consultation with Dr. Scott today at the [facility]. Left a detailed message introducing myself and requesting to schedule surgery. Awaiting on call back from patient.

## 2024-11-19 NOTE — ASSESSMENT & PLAN NOTE
Today in the office, we discussed his history of spontaneous pneumothorax.  We discussed that at this time, he could undergo expectant management and if he experiences the third time, then we would plan to take him to the operating room at that time.  We also discussed proceeding straight to the operating room room now in order to prevent any surgical emergency in the future and proceeding with a right VATS blebectomy and mechanical pleurodesis.  We would preferentially treat the right side first since he has had 2 spontaneous pneumothoraces on that side.  After discussing the risks and benefits of the surgery, risks including failure of the surgery, bleeding and infection, he would like to proceed.  Consent was obtained for the above procedure.  He would like to wait until after the new year and the holidays.  I believe that this is fine.  I did explain to him though that if he were to experience another spontaneous pneumothorax prior to this, then the plan would just be to proceed straight to the operating room while he was admitted to the hospital.  He understands and agrees with the plan.    Karolyn Scott MD  Thoracic Surgery  Office: [phone]

## 2024-11-20 ENCOUNTER — TELEPHONE (OUTPATIENT)
Dept: CARDIAC SURGERY | Facility: CLINIC | Age: 29
End: 2024-11-20

## 2024-11-20 NOTE — TELEPHONE ENCOUNTER
Made 2nd attempt to reach out to patient to schedule surgery. I left a detailed voice message informing patient that I will be reaching to his mother (emergency contact) if I don't hear back from him. Will follow up with his mother on Thursday, 11/21/24 to see if we can proceed scheduling. We are looking to schedule the procedure after the new year 2025.

## 2024-11-22 ENCOUNTER — TELEPHONE (OUTPATIENT)
Dept: CARDIAC SURGERY | Facility: CLINIC | Age: 29
End: 2024-11-22

## 2024-11-22 NOTE — TELEPHONE ENCOUNTER
Left a message for patient's father Mr. Saunders to let him know that I have been trying to get in contact with patient to further discuss surgical process. Asked if he or patient can call me to schedule surgery. Awaiting on call back from them.

## 2024-11-27 ENCOUNTER — TELEPHONE (OUTPATIENT)
Dept: CARDIAC SURGERY | Facility: CLINIC | Age: 29
End: 2024-11-27

## 2024-11-27 NOTE — TELEPHONE ENCOUNTER
Patient returned my call and we solidified surgery date for Tuesday, 2/11/25 with thoracic provider Dr. Scott. Scheduled an updated H&P to see a PA at the [facility] on 2/4/25. Post op appt was also scheduled for 3/4/25. Discussed surgical process with patient. Patient verbalized his understanding on his next steps. Provided my direct phone number to patient- if he should have further questions leading up to his surgery.

## 2024-12-04 ENCOUNTER — TELEPHONE (OUTPATIENT)
Dept: CARDIAC SURGERY | Facility: CLINIC | Age: 29
End: 2024-12-04

## 2024-12-04 NOTE — TELEPHONE ENCOUNTER
Left message for patient letting him know that there was discrepancy on my end regarding procedure date. We initially confirmed Tuesday, 2/11/25 and will need to move surgery to providers OR block time on Wednesday, 2/12/25. Awaiting on call back for message confirmation.

## 2025-01-24 ENCOUNTER — TELEPHONE (OUTPATIENT)
Dept: CARDIAC SURGERY | Facility: CLINIC | Age: 30
End: 2025-01-24

## 2025-01-24 NOTE — TELEPHONE ENCOUNTER
Left detailed message for patient informing him that there has been a change in Dr. Scott's schedule and will need to move his scheduled procedure from Wednesday, 2/12/2025 to Wednesday, 2/5/2025 or Wednesday, 2/19/2025. Provided options for patient. Awaiting on call back.

## 2025-01-27 ENCOUNTER — TELEPHONE (OUTPATIENT)
Dept: CARDIAC SURGERY | Facility: CLINIC | Age: 30
End: 2025-01-27

## 2025-01-27 NOTE — TELEPHONE ENCOUNTER
Left 2nd detailed message informing patient that there's been a change in provider's schedule and will need to reschedule his surgery date from 2/12/2025 to either 2/5/2025 or 2/19/2025. Awaiting on call back to reschedule. Provided my direct phone number.

## 2025-02-03 ENCOUNTER — ANESTHESIA EVENT (OUTPATIENT)
Dept: PERIOP | Facility: HOSPITAL | Age: 30
DRG: 165 | End: 2025-02-03
Payer: COMMERCIAL

## 2025-02-03 NOTE — PRE-PROCEDURE INSTRUCTIONS
Pre-Surgery Instructions:   Medication Instructions    acetaminophen (TYLENOL) 500 mg tablet Uses PRN- OK to take day of surgery    ibuprofen (MOTRIN) 800 mg tablet Stop taking 7 days prior to surgery.      Medication instructions for day surgery reviewed. Please use only a sip of water to take your instructed medications. Avoid all over the counter vitamins, supplements and NSAIDS for one week prior to surgery per anesthesia guidelines. Tylenol is ok to take as needed.     You will receive a call one business day prior to surgery with an arrival time and hospital directions. If your surgery is scheduled on a Monday, the hospital will be calling you on the Friday prior to your surgery. If you have not heard from anyone by 8pm, please call the hospital supervisor through the hospital  at [phone]. ([place] [phone] or [place] [phone]).    Do not eat or drink anything after midnight the night before your surgery, including candy, mints, lifesavers, or chewing gum. Do not drink alcohol 24hrs before your surgery. Try not to smoke at least 24hrs before your surgery.       Follow the pre surgery showering instructions as listed in the “My Surgical Experience Booklet” or otherwise provided by your surgeon's office. Do not use a blade to shave the surgical area 1 week before surgery. It is okay to use a clean electric clippers up to 24 hours before surgery. Do not apply any lotions, creams, including makeup, cologne, deodorant, or perfumes after showering on the day of your surgery. Do not use dry shampoo, hair spray, hair gel, or any type of hair products.     No contact lenses, eye make-up, or artificial eyelashes. Remove nail polish, including gel polish, and any artificial, gel, or acrylic nails if possible. Remove all jewelry including rings and body piercing jewelry.     Wear causal clothing that is easy to take on and off. Consider your type of surgery.    Keep any valuables, jewelry, piercings  at home. Please bring any specially ordered equipment (sling, braces) if indicated.    Arrange for a responsible person to drive you to and from the hospital on the day of your surgery. Please confirm the visitor policy for the day of your procedure when you receive your phone call with an arrival time.     Call the surgeon's office with any new illnesses, exposures, or additional questions prior to surgery.    Please reference your “My Surgical Experience Booklet” for additional information to prepare for your upcoming surgery.

## 2025-02-04 ENCOUNTER — OFFICE VISIT (OUTPATIENT)
Dept: CARDIAC SURGERY | Facility: CLINIC | Age: 30
End: 2025-02-04
Payer: COMMERCIAL

## 2025-02-04 VITALS
DIASTOLIC BLOOD PRESSURE: 80 MMHG | BODY MASS INDEX: 21.21 KG/M2 | SYSTOLIC BLOOD PRESSURE: 128 MMHG | HEIGHT: 66 IN | TEMPERATURE: 98.1 F | OXYGEN SATURATION: 98 % | HEART RATE: 59 BPM | WEIGHT: 132 LBS

## 2025-02-04 DIAGNOSIS — J93.83 RECURRENT SPONTANEOUS PNEUMOTHORAX: Primary | ICD-10-CM

## 2025-02-04 PROCEDURE — 99213 OFFICE O/P EST LOW 20 MIN: CPT | Performed by: THORACIC SURGERY (CARDIOTHORACIC VASCULAR SURGERY)

## 2025-02-04 NOTE — PROGRESS NOTES
/Name: Jaswant Saunders      : 1995      MRN: [id number]  Encounter Provider: [facility]acic [facility]  Encounter Date: 2025   Encounter department: [facility]  :  Assessment & Plan  Recurrent spontaneous pneumothorax  Jaswant is a 29 y.o. male who presents today for updated history and physical prior to planned right VATS blebectomy and mechanical pleurodesis with Dr. Scott on 2025. This is being done after two episodes of spontaneous right pneumothorax. Pt denies any interval changes in health. I explained the procedure and expected hospital course again with patient. Consent was previously signed. He plans to go for his routine pre-op labs later today. Will proceed as planned. All questions answered.       Thoracic History     No problems updated.     History of Present Illness     HPI Jaswant Saunders is a 29 y.o. male who presents today for an updated history and physical. Patient is scheduled for right VATS blebectomy with mechanical pleurodesis on 2025.    Patients had two episodes of spontaneous pneumothorax on the right and one on the left. He's been previously treated with chest tubes and has not required additional intervention. He was seen in consultation by Dr. Scott on  and elected to proceed with right blebectomy and pleurodesis.    Pt denies any medical changes since last seen. He denies CP, SOB, cough, N/V, F/C.    Past chest surgeries: None aside from chest tube placements     ECOG score: 0    Review of Systems   Constitutional:  Negative for chills, diaphoresis and unexpected weight change.   HENT: Negative.     Respiratory:  Negative for cough, shortness of breath and wheezing.    Cardiovascular:  Negative for chest pain and leg swelling.   Gastrointestinal:  Negative for abdominal pain, diarrhea and nausea.   Musculoskeletal: Negative.    Skin: Negative.    Neurological: Negative.    Hematological:  Negative for adenopathy. Does not  bruise/bleed easily.   Psychiatric/Behavioral: Negative.     All other systems reviewed and are negative.     Medical History Reviewed by provider this encounter:  Tobacco  Allergies  Meds  Problems  Med Hx  Surg Hx  Fam Hx     .  Past Medical History   History reviewed. No pertinent past medical history.  Past Surgical History:   Procedure Laterality Date    ABDOMINAL WALL SURGERY N/A 11/27/2018    Procedure: Intermediate  CLOSURE WOUND  2 cm left neck;  Surgeon: Berto Chavez MD;  Location: [facility];  Service: General    FACIAL/NECK BIOPSY Left 11/27/2018    Procedure: NECK MASS EXCISION BIOPSY;  Surgeon: Berto Chavez MD;  Location: [facility];  Service: General    KNEE SURGERY       Family History   Family history unknown: Yes      reports that he quit smoking about 13 years ago. His smoking use included cigarettes. He has never used smokeless tobacco. He reports that he does not currently use alcohol after a past usage of about 2.0 standard drinks of alcohol per week. He reports that he does not currently use drugs after having used the following drugs: Marijuana.  Current Outpatient Medications on File Prior to Visit   Medication Sig Dispense Refill    [DISCONTINUED] acetaminophen (TYLENOL) 500 mg tablet Take 1 tablet (500 mg total) by mouth every 6 (six) hours as needed for mild pain or moderate pain 30 tablet 0    [DISCONTINUED] ibuprofen (MOTRIN) 800 mg tablet Take 1 tablet (800 mg total) by mouth every 8 (eight) hours as needed for mild pain or moderate pain 30 tablet 0     No current facility-administered medications on file prior to visit.   No Known Allergies   Current Outpatient Medications on File Prior to Visit   Medication Sig Dispense Refill    [DISCONTINUED] acetaminophen (TYLENOL) 500 mg tablet Take 1 tablet (500 mg total) by mouth every 6 (six) hours as needed for mild pain or moderate pain 30 tablet 0    [DISCONTINUED] ibuprofen (MOTRIN) 800 mg tablet Take 1 tablet (800 mg total)  "by mouth every 8 (eight) hours as needed for mild pain or moderate pain 30 tablet 0     No current facility-administered medications on file prior to visit.      Social History     Tobacco Use    Smoking status: Former     Current packs/day: 0.00     Types: Cigarettes     Quit date:      Years since quittin.1    Smokeless tobacco: Never   Vaping Use    Vaping status: Never Used   Substance and Sexual Activity    Alcohol use: Not Currently     Alcohol/week: 2.0 standard drinks of alcohol     Types: 2 Cans of beer per week     Comment: socially     Drug use: Not Currently     Types: Marijuana     Comment: \"couple weeks ago\"    Sexual activity: Never        Objective   /80 (BP Location: Left arm, Patient Position: Sitting, Cuff Size: Standard)   Pulse 59   Temp 98.1 °F (36.7 °C) (Temporal)   Ht 5' 6\" (1.676 m)   Wt 59.9 kg (132 lb)   SpO2 98%   BMI 21.31 kg/m²     Pain Screening:  Pain Score: 0-No pain  ECOG    Physical Exam  Vitals reviewed.   Constitutional:       General: He is not in acute distress.     Appearance: Normal appearance. He is not ill-appearing.   HENT:      Head: Normocephalic.      Nose: Nose normal.      Mouth/Throat:      Mouth: Mucous membranes are moist.   Cardiovascular:      Rate and Rhythm: Normal rate.   Pulmonary:      Effort: Pulmonary effort is normal.      Breath sounds: Normal breath sounds. No wheezing, rhonchi or rales.      Comments: Previous right chest tube incisions healed  Abdominal:      Palpations: Abdomen is soft.   Musculoskeletal:         General: No swelling. Normal range of motion.   Skin:     General: Skin is warm.   Neurological:      General: No focal deficit present.      Mental Status: He is alert and oriented to person, place, and time.   Psychiatric:         Mood and Affect: Mood normal.     "

## 2025-02-04 NOTE — H&P (VIEW-ONLY)
/Name: Jaswant Saunders      : 1995      MRN: [id number]  Encounter Provider: Thoracic [facility]  Encounter Date: 2025   Encounter department: [facility]  :  Assessment & Plan  Recurrent spontaneous pneumothorax  Jaswant is a 29 y.o. male who presents today for updated history and physical prior to planned right VATS blebectomy and mechanical pleurodesis with Dr. Scott on 2025. This is being done after two episodes of spontaneous right pneumothorax. Pt denies any interval changes in health. I explained the procedure and expected hospital course again with patient. Consent was previously signed. He plans to go for his routine pre-op labs later today. Will proceed as planned. All questions answered.       Thoracic History     No problems updated.     History of Present Illness     HPI Jaswant Saunders is a 29 y.o. male who presents today for an updated history and physical. Patient is scheduled for right VATS blebectomy with mechanical pleurodesis on 2025.    Patients had two episodes of spontaneous pneumothorax on the right and one on the left. He's been previously treated with chest tubes and has not required additional intervention. He was seen in consultation by Dr. Scott on  and elected to proceed with right blebectomy and pleurodesis.    Pt denies any medical changes since last seen. He denies CP, SOB, cough, N/V, F/C.    Past chest surgeries: None aside from chest tube placements     ECOG score: 0    Review of Systems   Constitutional:  Negative for chills, diaphoresis and unexpected weight change.   HENT: Negative.     Respiratory:  Negative for cough, shortness of breath and wheezing.    Cardiovascular:  Negative for chest pain and leg swelling.   Gastrointestinal:  Negative for abdominal pain, diarrhea and nausea.   Musculoskeletal: Negative.    Skin: Negative.    Neurological: Negative.    Hematological:  Negative for adenopathy. Does not  bruise/bleed easily.   Psychiatric/Behavioral: Negative.     All other systems reviewed and are negative.     Medical History Reviewed by provider this encounter:  Tobacco  Allergies  Meds  Problems  Med Hx  Surg Hx  Fam Hx     .  Past Medical History   History reviewed. No pertinent past medical history.  Past Surgical History:   Procedure Laterality Date    ABDOMINAL WALL SURGERY N/A 11/27/2018    Procedure: Intermediate  CLOSURE WOUND  2 cm left neck;  Surgeon: Berto Chavez MD;  Location: [facility];  Service: General    FACIAL/NECK BIOPSY Left 11/27/2018    Procedure: NECK MASS EXCISION BIOPSY;  Surgeon: Berto Chavez MD;  Location: [facility];  Service: General    KNEE SURGERY       Family History   Family history unknown: Yes      reports that he quit smoking about 13 years ago. His smoking use included cigarettes. He has never used smokeless tobacco. He reports that he does not currently use alcohol after a past usage of about 2.0 standard drinks of alcohol per week. He reports that he does not currently use drugs after having used the following drugs: Marijuana.  Current Outpatient Medications on File Prior to Visit   Medication Sig Dispense Refill    [DISCONTINUED] acetaminophen (TYLENOL) 500 mg tablet Take 1 tablet (500 mg total) by mouth every 6 (six) hours as needed for mild pain or moderate pain 30 tablet 0    [DISCONTINUED] ibuprofen (MOTRIN) 800 mg tablet Take 1 tablet (800 mg total) by mouth every 8 (eight) hours as needed for mild pain or moderate pain 30 tablet 0     No current facility-administered medications on file prior to visit.   No Known Allergies   Current Outpatient Medications on File Prior to Visit   Medication Sig Dispense Refill    [DISCONTINUED] acetaminophen (TYLENOL) 500 mg tablet Take 1 tablet (500 mg total) by mouth every 6 (six) hours as needed for mild pain or moderate pain 30 tablet 0    [DISCONTINUED] ibuprofen (MOTRIN) 800 mg tablet Take 1 tablet (800 mg total)  "by mouth every 8 (eight) hours as needed for mild pain or moderate pain 30 tablet 0     No current facility-administered medications on file prior to visit.      Social History     Tobacco Use    Smoking status: Former     Current packs/day: 0.00     Types: Cigarettes     Quit date:      Years since quittin.1    Smokeless tobacco: Never   Vaping Use    Vaping status: Never Used   Substance and Sexual Activity    Alcohol use: Not Currently     Alcohol/week: 2.0 standard drinks of alcohol     Types: 2 Cans of beer per week     Comment: socially     Drug use: Not Currently     Types: Marijuana     Comment: \"couple weeks ago\"    Sexual activity: Never        Objective   /80 (BP Location: Left arm, Patient Position: Sitting, Cuff Size: Standard)   Pulse 59   Temp 98.1 °F (36.7 °C) (Temporal)   Ht 5' 6\" (1.676 m)   Wt 59.9 kg (132 lb)   SpO2 98%   BMI 21.31 kg/m²     Pain Screening:  Pain Score: 0-No pain  ECOG    Physical Exam  Vitals reviewed.   Constitutional:       General: He is not in acute distress.     Appearance: Normal appearance. He is not ill-appearing.   HENT:      Head: Normocephalic.      Nose: Nose normal.      Mouth/Throat:      Mouth: Mucous membranes are moist.   Cardiovascular:      Rate and Rhythm: Normal rate.   Pulmonary:      Effort: Pulmonary effort is normal.      Breath sounds: Normal breath sounds. No wheezing, rhonchi or rales.      Comments: Previous right chest tube incisions healed  Abdominal:      Palpations: Abdomen is soft.   Musculoskeletal:         General: No swelling. Normal range of motion.   Skin:     General: Skin is warm.   Neurological:      General: No focal deficit present.      Mental Status: He is alert and oriented to person, place, and time.   Psychiatric:         Mood and Affect: Mood normal.     "

## 2025-02-04 NOTE — ASSESSMENT & PLAN NOTE
Jaswant is a 29 y.o. male who presents today for updated history and physical prior to planned right VATS blebectomy and mechanical pleurodesis with Dr. Scott on 2/19/2025. This is being done after two episodes of spontaneous right pneumothorax. Pt denies any interval changes in health. I explained the procedure and expected hospital course again with patient. Consent was previously signed. He plans to go for his routine pre-op labs later today. Will proceed as planned. All questions answered.

## 2025-02-05 ENCOUNTER — APPOINTMENT (OUTPATIENT)
Dept: LAB | Facility: HOSPITAL | Age: 30
End: 2025-02-05
Payer: COMMERCIAL

## 2025-02-05 DIAGNOSIS — J93.83 RECURRENT SPONTANEOUS PNEUMOTHORAX: ICD-10-CM

## 2025-02-05 LAB
ANION GAP SERPL CALCULATED.3IONS-SCNC: 4 MMOL/L (ref 4–13)
BUN SERPL-MCNC: 19 MG/DL (ref 5–25)
CALCIUM SERPL-MCNC: 9 MG/DL (ref 8.4–10.2)
CHLORIDE SERPL-SCNC: 106 MMOL/L (ref 96–108)
CO2 SERPL-SCNC: 29 MMOL/L (ref 21–32)
CREAT SERPL-MCNC: 0.78 MG/DL (ref 0.6–1.3)
ERYTHROCYTE [DISTWIDTH] IN BLOOD BY AUTOMATED COUNT: 12.1 % (ref 11.6–15.1)
GFR SERPL CREATININE-BSD FRML MDRD: 121 ML/MIN/1.73SQ M
GLUCOSE P FAST SERPL-MCNC: 100 MG/DL (ref 65–99)
HCT VFR BLD AUTO: 47.4 % (ref 36.5–49.3)
HGB BLD-MCNC: 16.1 G/DL (ref 12–17)
INR PPP: 0.88 (ref 0.85–1.19)
MCH RBC QN AUTO: 30.8 PG (ref 26.8–34.3)
MCHC RBC AUTO-ENTMCNC: 34 G/DL (ref 31.4–37.4)
MCV RBC AUTO: 91 FL (ref 82–98)
PLATELET # BLD AUTO: 142 THOUSANDS/UL (ref 149–390)
PMV BLD AUTO: 9.2 FL (ref 8.9–12.7)
POTASSIUM SERPL-SCNC: 4.1 MMOL/L (ref 3.5–5.3)
PROTHROMBIN TIME: 12.6 SECONDS (ref 12.3–15)
RBC # BLD AUTO: 5.22 MILLION/UL (ref 3.88–5.62)
SODIUM SERPL-SCNC: 139 MMOL/L (ref 135–147)
WBC # BLD AUTO: 5.29 THOUSAND/UL (ref 4.31–10.16)

## 2025-02-05 PROCEDURE — 85610 PROTHROMBIN TIME: CPT

## 2025-02-05 PROCEDURE — 85027 COMPLETE CBC AUTOMATED: CPT

## 2025-02-05 PROCEDURE — 36415 COLL VENOUS BLD VENIPUNCTURE: CPT

## 2025-02-05 PROCEDURE — 80048 BASIC METABOLIC PNL TOTAL CA: CPT

## 2025-02-19 ENCOUNTER — HOSPITAL ENCOUNTER (INPATIENT)
Facility: HOSPITAL | Age: 30
LOS: 4 days | Discharge: HOME | DRG: 165 | End: 2025-02-23
Attending: THORACIC SURGERY (CARDIOTHORACIC VASCULAR SURGERY) | Admitting: THORACIC SURGERY (CARDIOTHORACIC VASCULAR SURGERY)
Payer: COMMERCIAL

## 2025-02-19 ENCOUNTER — ANESTHESIA (OUTPATIENT)
Dept: PERIOP | Facility: HOSPITAL | Age: 30
DRG: 165 | End: 2025-02-19
Payer: COMMERCIAL

## 2025-02-19 ENCOUNTER — APPOINTMENT (INPATIENT)
Dept: RADIOLOGY | Facility: HOSPITAL | Age: 30
DRG: 165 | End: 2025-02-19
Payer: COMMERCIAL

## 2025-02-19 DIAGNOSIS — J93.83 RECURRENT SPONTANEOUS PNEUMOTHORAX: Primary | ICD-10-CM

## 2025-02-19 PROBLEM — Z87.891 FORMER SMOKER: Status: ACTIVE | Noted: 2025-02-19

## 2025-02-19 LAB
ANION GAP SERPL CALCULATED.3IONS-SCNC: 5 MMOL/L (ref 4–13)
BUN SERPL-MCNC: 11 MG/DL (ref 5–25)
CALCIUM SERPL-MCNC: 9.4 MG/DL (ref 8.4–10.2)
CHLORIDE SERPL-SCNC: 107 MMOL/L (ref 96–108)
CO2 SERPL-SCNC: 27 MMOL/L (ref 21–32)
CREAT SERPL-MCNC: 0.91 MG/DL (ref 0.6–1.3)
ERYTHROCYTE [DISTWIDTH] IN BLOOD BY AUTOMATED COUNT: 12.1 % (ref 11.6–15.1)
GFR SERPL CREATININE-BSD FRML MDRD: 112 ML/MIN/1.73SQ M
GLUCOSE SERPL-MCNC: 102 MG/DL (ref 65–140)
HCT VFR BLD AUTO: 52.2 % (ref 36.5–49.3)
HGB BLD-MCNC: 17.1 G/DL (ref 12–17)
MAGNESIUM SERPL-MCNC: 2.1 MG/DL (ref 1.9–2.7)
MCH RBC QN AUTO: 30.9 PG (ref 26.8–34.3)
MCHC RBC AUTO-ENTMCNC: 32.8 G/DL (ref 31.4–37.4)
MCV RBC AUTO: 94 FL (ref 82–98)
PLATELET # BLD AUTO: 173 THOUSANDS/UL (ref 149–390)
PMV BLD AUTO: 9.3 FL (ref 8.9–12.7)
POTASSIUM SERPL-SCNC: 4.8 MMOL/L (ref 3.5–5.3)
RBC # BLD AUTO: 5.54 MILLION/UL (ref 3.88–5.62)
SODIUM SERPL-SCNC: 139 MMOL/L (ref 135–147)
WBC # BLD AUTO: 8.49 THOUSAND/UL (ref 4.31–10.16)

## 2025-02-19 PROCEDURE — 88307 TISSUE EXAM BY PATHOLOGIST: CPT | Performed by: PATHOLOGY

## 2025-02-19 PROCEDURE — 83735 ASSAY OF MAGNESIUM: CPT

## 2025-02-19 PROCEDURE — 80048 BASIC METABOLIC PNL TOTAL CA: CPT

## 2025-02-19 PROCEDURE — 0BBK4ZZ EXCISION OF RIGHT LUNG, PERCUTANEOUS ENDOSCOPIC APPROACH: ICD-10-PCS | Performed by: THORACIC SURGERY (CARDIOTHORACIC VASCULAR SURGERY)

## 2025-02-19 PROCEDURE — C2615 SEALANT, PULMONARY, LIQUID: HCPCS | Performed by: THORACIC SURGERY (CARDIOTHORACIC VASCULAR SURGERY)

## 2025-02-19 PROCEDURE — 71045 X-RAY EXAM CHEST 1 VIEW: CPT

## 2025-02-19 PROCEDURE — 32655 THORACOSCOPY RESECT BULLAE: CPT | Performed by: THORACIC SURGERY (CARDIOTHORACIC VASCULAR SURGERY)

## 2025-02-19 PROCEDURE — 85027 COMPLETE CBC AUTOMATED: CPT

## 2025-02-19 PROCEDURE — 94664 DEMO&/EVAL PT USE INHALER: CPT

## 2025-02-19 PROCEDURE — 0B5N4ZZ DESTRUCTION OF RIGHT PLEURA, PERCUTANEOUS ENDOSCOPIC APPROACH: ICD-10-PCS | Performed by: THORACIC SURGERY (CARDIOTHORACIC VASCULAR SURGERY)

## 2025-02-19 PROCEDURE — 0BJ08ZZ INSPECTION OF TRACHEOBRONCHIAL TREE, VIA NATURAL OR ARTIFICIAL OPENING ENDOSCOPIC: ICD-10-PCS | Performed by: THORACIC SURGERY (CARDIOTHORACIC VASCULAR SURGERY)

## 2025-02-19 RX ORDER — PANTOPRAZOLE SODIUM 40 MG/1
40 TABLET, DELAYED RELEASE ORAL
Status: DISCONTINUED | OUTPATIENT
Start: 2025-02-20 | End: 2025-02-23 | Stop reason: HOSPADM

## 2025-02-19 RX ORDER — ACETAMINOPHEN 325 MG/1
975 TABLET ORAL EVERY 6 HOURS
Status: DISCONTINUED | OUTPATIENT
Start: 2025-02-19 | End: 2025-02-23 | Stop reason: HOSPADM

## 2025-02-19 RX ORDER — DOCUSATE SODIUM 100 MG/1
100 CAPSULE, LIQUID FILLED ORAL 2 TIMES DAILY
Status: DISCONTINUED | OUTPATIENT
Start: 2025-02-19 | End: 2025-02-23 | Stop reason: HOSPADM

## 2025-02-19 RX ORDER — FENTANYL CITRATE/PF 50 MCG/ML
25 SYRINGE (ML) INJECTION
Status: DISCONTINUED | OUTPATIENT
Start: 2025-02-19 | End: 2025-02-19 | Stop reason: HOSPADM

## 2025-02-19 RX ORDER — DEXAMETHASONE SODIUM PHOSPHATE 10 MG/ML
INJECTION, SOLUTION INTRAMUSCULAR; INTRAVENOUS AS NEEDED
Status: DISCONTINUED | OUTPATIENT
Start: 2025-02-19 | End: 2025-02-19

## 2025-02-19 RX ORDER — LIDOCAINE HYDROCHLORIDE 20 MG/ML
INJECTION, SOLUTION EPIDURAL; INFILTRATION; INTRACAUDAL; PERINEURAL AS NEEDED
Status: DISCONTINUED | OUTPATIENT
Start: 2025-02-19 | End: 2025-02-19

## 2025-02-19 RX ORDER — HYDROMORPHONE HCL/PF 1 MG/ML
0.5 SYRINGE (ML) INJECTION
Status: DISCONTINUED | OUTPATIENT
Start: 2025-02-19 | End: 2025-02-23 | Stop reason: HOSPADM

## 2025-02-19 RX ORDER — MIDAZOLAM HYDROCHLORIDE 2 MG/2ML
INJECTION, SOLUTION INTRAMUSCULAR; INTRAVENOUS AS NEEDED
Status: DISCONTINUED | OUTPATIENT
Start: 2025-02-19 | End: 2025-02-19

## 2025-02-19 RX ORDER — PROPOFOL 10 MG/ML
INJECTION, EMULSION INTRAVENOUS AS NEEDED
Status: DISCONTINUED | OUTPATIENT
Start: 2025-02-19 | End: 2025-02-19

## 2025-02-19 RX ORDER — ROCURONIUM BROMIDE 10 MG/ML
INJECTION, SOLUTION INTRAVENOUS AS NEEDED
Status: DISCONTINUED | OUTPATIENT
Start: 2025-02-19 | End: 2025-02-19

## 2025-02-19 RX ORDER — HYDROMORPHONE HCL/PF 1 MG/ML
0.5 SYRINGE (ML) INJECTION
Status: DISCONTINUED | OUTPATIENT
Start: 2025-02-19 | End: 2025-02-19 | Stop reason: HOSPADM

## 2025-02-19 RX ORDER — PROPOFOL 10 MG/ML
INJECTION, EMULSION INTRAVENOUS CONTINUOUS PRN
Status: DISCONTINUED | OUTPATIENT
Start: 2025-02-19 | End: 2025-02-19

## 2025-02-19 RX ORDER — ACETAMINOPHEN 325 MG/1
975 TABLET ORAL ONCE
Status: COMPLETED | OUTPATIENT
Start: 2025-02-19 | End: 2025-02-19

## 2025-02-19 RX ORDER — SODIUM CHLORIDE, SODIUM LACTATE, POTASSIUM CHLORIDE, CALCIUM CHLORIDE 600; 310; 30; 20 MG/100ML; MG/100ML; MG/100ML; MG/100ML
60 INJECTION, SOLUTION INTRAVENOUS CONTINUOUS
Status: DISCONTINUED | OUTPATIENT
Start: 2025-02-19 | End: 2025-02-20

## 2025-02-19 RX ORDER — SODIUM CHLORIDE, SODIUM LACTATE, POTASSIUM CHLORIDE, CALCIUM CHLORIDE 600; 310; 30; 20 MG/100ML; MG/100ML; MG/100ML; MG/100ML
INJECTION, SOLUTION INTRAVENOUS CONTINUOUS PRN
Status: DISCONTINUED | OUTPATIENT
Start: 2025-02-19 | End: 2025-02-19

## 2025-02-19 RX ORDER — CEFAZOLIN SODIUM 2 G/50ML
2000 SOLUTION INTRAVENOUS ONCE
Status: DISCONTINUED | OUTPATIENT
Start: 2025-02-19 | End: 2025-02-23 | Stop reason: HOSPADM

## 2025-02-19 RX ORDER — POLYETHYLENE GLYCOL 3350 17 G/17G
17 POWDER, FOR SOLUTION ORAL DAILY PRN
Status: DISCONTINUED | OUTPATIENT
Start: 2025-02-19 | End: 2025-02-23 | Stop reason: HOSPADM

## 2025-02-19 RX ORDER — GABAPENTIN 300 MG/1
300 CAPSULE ORAL 3 TIMES DAILY
Status: DISCONTINUED | OUTPATIENT
Start: 2025-02-19 | End: 2025-02-23 | Stop reason: HOSPADM

## 2025-02-19 RX ORDER — GABAPENTIN 300 MG/1
600 CAPSULE ORAL ONCE
Status: COMPLETED | OUTPATIENT
Start: 2025-02-19 | End: 2025-02-19

## 2025-02-19 RX ORDER — SODIUM CHLORIDE, SODIUM LACTATE, POTASSIUM CHLORIDE, CALCIUM CHLORIDE 600; 310; 30; 20 MG/100ML; MG/100ML; MG/100ML; MG/100ML
75 INJECTION, SOLUTION INTRAVENOUS CONTINUOUS
Status: DISCONTINUED | OUTPATIENT
Start: 2025-02-19 | End: 2025-02-19

## 2025-02-19 RX ORDER — ENOXAPARIN SODIUM 100 MG/ML
40 INJECTION SUBCUTANEOUS DAILY
Status: DISCONTINUED | OUTPATIENT
Start: 2025-02-20 | End: 2025-02-23 | Stop reason: HOSPADM

## 2025-02-19 RX ORDER — SENNOSIDES 8.6 MG
1 TABLET ORAL DAILY
Status: DISCONTINUED | OUTPATIENT
Start: 2025-02-19 | End: 2025-02-23 | Stop reason: HOSPADM

## 2025-02-19 RX ORDER — ONDANSETRON 2 MG/ML
4 INJECTION INTRAMUSCULAR; INTRAVENOUS EVERY 6 HOURS PRN
Status: DISCONTINUED | OUTPATIENT
Start: 2025-02-19 | End: 2025-02-23 | Stop reason: HOSPADM

## 2025-02-19 RX ORDER — KETAMINE HCL IN NACL, ISO-OSM 100MG/10ML
SYRINGE (ML) INJECTION AS NEEDED
Status: DISCONTINUED | OUTPATIENT
Start: 2025-02-19 | End: 2025-02-19

## 2025-02-19 RX ORDER — FENTANYL CITRATE 50 UG/ML
INJECTION, SOLUTION INTRAMUSCULAR; INTRAVENOUS AS NEEDED
Status: DISCONTINUED | OUTPATIENT
Start: 2025-02-19 | End: 2025-02-19

## 2025-02-19 RX ORDER — ONDANSETRON 2 MG/ML
INJECTION INTRAMUSCULAR; INTRAVENOUS AS NEEDED
Status: DISCONTINUED | OUTPATIENT
Start: 2025-02-19 | End: 2025-02-19

## 2025-02-19 RX ORDER — OXYCODONE HYDROCHLORIDE 5 MG/1
5 TABLET ORAL EVERY 4 HOURS PRN
Refills: 0 | Status: DISCONTINUED | OUTPATIENT
Start: 2025-02-19 | End: 2025-02-23 | Stop reason: HOSPADM

## 2025-02-19 RX ORDER — CEFAZOLIN SODIUM 1 G/3ML
INJECTION, POWDER, FOR SOLUTION INTRAMUSCULAR; INTRAVENOUS AS NEEDED
Status: DISCONTINUED | OUTPATIENT
Start: 2025-02-19 | End: 2025-02-19

## 2025-02-19 RX ADMIN — SODIUM CHLORIDE, SODIUM LACTATE, POTASSIUM CHLORIDE, AND CALCIUM CHLORIDE 75 ML/HR: .6; .31; .03; .02 INJECTION, SOLUTION INTRAVENOUS at 12:37

## 2025-02-19 RX ADMIN — DEXAMETHASONE SODIUM PHOSPHATE 10 MG: 10 INJECTION, SOLUTION INTRAMUSCULAR; INTRAVENOUS at 14:23

## 2025-02-19 RX ADMIN — FENTANYL CITRATE 25 MCG: 50 INJECTION INTRAMUSCULAR; INTRAVENOUS at 17:10

## 2025-02-19 RX ADMIN — LIDOCAINE HYDROCHLORIDE 100 MG: 20 INJECTION, SOLUTION EPIDURAL; INFILTRATION; INTRACAUDAL; PERINEURAL at 14:23

## 2025-02-19 RX ADMIN — MIDAZOLAM 2 MG: 1 INJECTION INTRAMUSCULAR; INTRAVENOUS at 14:18

## 2025-02-19 RX ADMIN — SENNOSIDES 8.6 MG: 8.6 TABLET ORAL at 18:01

## 2025-02-19 RX ADMIN — FENTANYL CITRATE 25 MCG: 50 INJECTION INTRAMUSCULAR; INTRAVENOUS at 17:05

## 2025-02-19 RX ADMIN — CEFAZOLIN 2000 MG: 1 INJECTION, POWDER, FOR SOLUTION INTRAMUSCULAR; INTRAVENOUS at 14:49

## 2025-02-19 RX ADMIN — DOCUSATE SODIUM 100 MG: 100 CAPSULE, LIQUID FILLED ORAL at 18:01

## 2025-02-19 RX ADMIN — ACETAMINOPHEN 975 MG: 325 TABLET, FILM COATED ORAL at 18:01

## 2025-02-19 RX ADMIN — ROCURONIUM BROMIDE 70 MG: 10 INJECTION, SOLUTION INTRAVENOUS at 14:24

## 2025-02-19 RX ADMIN — GABAPENTIN 600 MG: 300 CAPSULE ORAL at 12:14

## 2025-02-19 RX ADMIN — PROPOFOL 180 MCG/KG/MIN: 10 INJECTION, EMULSION INTRAVENOUS at 14:23

## 2025-02-19 RX ADMIN — REMIFENTANIL HYDROCHLORIDE 0.15 MCG/KG/MIN: 1 INJECTION, POWDER, LYOPHILIZED, FOR SOLUTION INTRAVENOUS at 14:23

## 2025-02-19 RX ADMIN — PHENYLEPHRINE HYDROCHLORIDE 30 MCG/MIN: 10 INJECTION INTRAVENOUS at 14:23

## 2025-02-19 RX ADMIN — SODIUM CHLORIDE, SODIUM LACTATE, POTASSIUM CHLORIDE, AND CALCIUM CHLORIDE: .6; .31; .03; .02 INJECTION, SOLUTION INTRAVENOUS at 14:17

## 2025-02-19 RX ADMIN — Medication 50 MG: at 14:23

## 2025-02-19 RX ADMIN — ROCURONIUM BROMIDE 20 MG: 10 INJECTION, SOLUTION INTRAVENOUS at 15:21

## 2025-02-19 RX ADMIN — FENTANYL CITRATE 100 MCG: 50 INJECTION INTRAMUSCULAR; INTRAVENOUS at 14:23

## 2025-02-19 RX ADMIN — ACETAMINOPHEN 975 MG: 325 TABLET, FILM COATED ORAL at 12:14

## 2025-02-19 RX ADMIN — ONDANSETRON 4 MG: 2 INJECTION INTRAMUSCULAR; INTRAVENOUS at 14:18

## 2025-02-19 RX ADMIN — GABAPENTIN 300 MG: 300 CAPSULE ORAL at 18:01

## 2025-02-19 RX ADMIN — ACETAMINOPHEN 975 MG: 325 TABLET, FILM COATED ORAL at 21:08

## 2025-02-19 RX ADMIN — PROPOFOL 100 MG: 10 INJECTION, EMULSION INTRAVENOUS at 14:23

## 2025-02-19 RX ADMIN — SUGAMMADEX 200 MG: 100 INJECTION, SOLUTION INTRAVENOUS at 15:57

## 2025-02-19 RX ADMIN — Medication 2.5 MG: at 22:25

## 2025-02-19 NOTE — INTERVAL H&P NOTE
H&P reviewed. After examining the patient I find no changes in the patients condition since the H&P had been written.    Vitals:    02/19/25 1208   BP: 125/83   Pulse: 70   Resp: 16   Temp: 97.9 °F (36.6 °C)   SpO2: 100%     Safe to proceed. R VATS bleb resection with mechanical pleurodesis    Karolyn Scott MD  Thoracic Surgery  Office: [phone]

## 2025-02-19 NOTE — ANESTHESIA PREPROCEDURE EVALUATION
Procedure:  right thoracoscopic blebectomy with mechanical pleurodesis (Right: Chest)  BRONCHOSCOPY FLEXIBLE ([place])    Relevant Problems   PULMONARY   (+) Recurrent spontaneous pneumothorax      Behavioral Health   (+) Former smoker      Other   (+) History of pneumothorax        Physical Exam    Airway    Mallampati score: II  TM Distance: >3 FB  Neck ROM: full     Dental   No notable dental hx     Cardiovascular      Pulmonary   No wheezes    Other Findings    Anesthesia Plan  ASA Score- 3     Anesthesia Type- general with ASA Monitors.         Additional Monitors:     Airway Plan:     Comment: MK - Left 39Fr & 41 available..       Plan Factors-Exercise tolerance (METS): >4 METS.    Chart reviewed. EKG reviewed.  Existing labs reviewed. Patient summary reviewed.    Patient is not a current smoker.              Induction- intravenous.    Postoperative Plan- Plan for postoperative opioid use. Planned trial extubation        Informed Consent- Anesthetic plan and risks discussed with patient.  I personally reviewed this patient with the CRNA. Discussed and agreed on the Anesthesia Plan with the CRNA..      NPO Status:  No vitals data found for the desired time range.      VITALS  There were no vitals taken for this visit. BP Readings from Last 3 Encounters:   02/04/25 128/80   11/19/24 108/84   11/08/24 118/68        LABS  Results from Last 12 Months   Lab Units 02/05/25  0849 10/30/24  0503   WBC Thousand/uL 5.29 5.62   HEMOGLOBIN g/dL 16.1 16.3   HEMATOCRIT % 47.4 48.8   PLATELETS Thousands/uL 142* 187     Results from Last 12 Months   Lab Units 02/05/25  0849 10/28/24  0742   INR  0.88 0.88   PTT seconds  --  28    Results from Last 12 Months   Lab Units 02/05/25  0849 10/30/24  0503 10/28/24  0742   SODIUM mmol/L 139 139 139   POTASSIUM mmol/L 4.1 4.1 3.8   CHLORIDE mmol/L 106 105 103   CO2 mmol/L 29 29 30   ANION GAP mmol/L 4 5 6   BUN mg/dL 19 15 21   CREATININE mg/dL 0.78 0.99 0.85   CALCIUM mg/dL 9.0 9.4  9.4   GLUCOSE RANDOM mg/dL  --  101 100   MAGNESIUM mg/dL  --  2.2  --         ECG      ECHOCARDIOGRAPHY OR OTHER TESTING/IMAGING  Encounter Date: 10/28/24   ECG 12 lead   Result Value    Ventricular Rate 60    Atrial Rate 60    NH Interval 122    QRSD Interval 86    QT Interval 386    QTC Interval 386    P Axis 86    QRS Axis 92    T Wave Axis 84    Narrative    Normal sinus rhythm  Rightward axis    Confirmed by Lorie Wyman ([id number]) on 10/28/2024 1:53:09 PM     No results found for this or any previous visit. N/a     ------- ANESTHESIA RISK-BENEFIT DISCUSSION -------  BENEFITS OF A SPECIALIZED ANESTHESIA TEAM INCLUDE (NBK 450676, PMID 55774047):  (1) Reduce mortality and morbidity for major surgeries/procedures. (2) The team provides analgesia/sedation/amnesia/akinesia as safely as possible. (3) The team strives to reduce discomfort as safely as possible.    RISKS, AND PLANS TO MITIGATE RISKS, INCLUDE:    - Neurologic system: IntraOp awareness (Risk is ~1:1,000 - 1:14,000; PMID 60455274), Stroke (Risk ~<0.1-2% for most cases; PMID 93162331), nerve injury, vision loss, and POCD.     - Airway and Pulmonary system: Dental or mouth injury, throat pain, critical hypoxia, pneumothorax, prolonged intubation, post-op respiratory compromise.  Airway/Intubation risks and prior data: No prior advanced airway notes in [facility] EMR  Major ARISCAT risk factors for pulmonary complications include: Intrathoracic Surgery: 2 pts and Case surgical time estimated at >2hrs: 1pt, yielding a score of 2-3= Intermediate risk, 13.3%.  - Cardiovascular system: Hypotension, arrhythmias, cardiac injury or arrest, blood clots, bleeding, infection, vascular injuries.  Toby's RCRI score items: none, yielding an RCRI Score of 0= 0.4% risk of MACE  Are el-op or intra-op beta blockers indicated? (PMID 30439206): no  - FEN/GI system: Aspiration risk (~0.5% per PMC 2395861) and PONV (10-80% per Apfel score) especially if the patient has not  fasted.  ASA NPO guideline compliance?: Yes  - Medication risk assessment: Allergic reactions, excessive bleeding with anticoagulant use, overdoses, drug-drug interactions, injury to a fetus or  in pregnant or breastfeeding patients, el-procedural sedation including while driving/operating machinery.  Recent relevant medications: See MAR or Med Review  Personal or family history of anesthesia complications: no  Pregnancy Status: N/A  - Estimate mortality risks associated with anesthesia based on ASA-PS (PMID 55256220): ASA-PS III: 1:3,500

## 2025-02-19 NOTE — RESPIRATORY THERAPY NOTE
"RT Protocol Note  Jaswant Saunders 29 y.o. male MRN: [id number]  Unit/Bed#: [facility] 524-01 Encounter: [id number]    Assessment    Principal Problem:    Recurrent spontaneous pneumothorax  Active Problems:    Former smoker      Home Pulmonary Medications:  None       Past Medical History:   Diagnosis Date    Pneumothorax     Aug 2017, 2024     Social History     Socioeconomic History    Marital status: /Civil Union     Spouse name: None    Number of children: None    Years of education: None    Highest education level: None   Occupational History    None   Tobacco Use    Smoking status: Former     Current packs/day: 0.00     Types: Cigarettes     Quit date:      Years since quittin.1    Smokeless tobacco: Never   Vaping Use    Vaping status: Never Used   Substance and Sexual Activity    Alcohol use: Not Currently     Alcohol/week: 2.0 standard drinks of alcohol     Types: 2 Cans of beer per week     Comment: socially     Drug use: Not Currently     Types: Marijuana     Comment: \"couple weeks ago\"    Sexual activity: Never   Other Topics Concern    None   Social History Narrative    None     Social Drivers of Health     Financial Resource Strain: Not on file   Food Insecurity: No Food Insecurity (10/29/2024)    Nursing - Inadequate Food Risk Classification     Worried About Running Out of Food in the Last Year: Never true     Ran Out of Food in the Last Year: Never true     Ran Out of Food in the Last Year: Never true   Transportation Needs: No Transportation Needs (10/29/2024)    Nursing - Transportation Risk Classification     Lack of Transportation: Not on file     Lack of Transportation: No   Physical Activity: Not on file   Stress: Not on file   Social Connections: Unknown (2024)    Received from [facility]    Social Connections     How often do you feel lonely or isolated from those around you? (Adult - for ages 18 years and over): Not on file   Intimate Partner Violence: Unknown (10/29/2024)    " "Nursing IPS     Feels Physically and Emotionally Safe: Not on file     Physically Hurt by Someone: Not on file     Humiliated or Emotionally Abused by Someone: Not on file     Physically Hurt by Someone: No     Hurt or Threatened by Someone: No   Housing Stability: Unknown (10/29/2024)    Nursing: Inadequate Housing Risk Classification     Has Housing: Not on file     Worried About Losing Housing: Not on file     Unable to Get Utilities: Not on file     Unable to Pay for Housing in the Last Year: No     Has Housin       Subjective         Objective    Physical Exam:   Assessment Type: Assess only  General Appearance: Alert, Awake  Respiratory Pattern: Normal  Chest Assessment: Chest expansion symmetrical  Bilateral Breath Sounds: Clear, Diminished    Vitals:  Blood pressure 136/88, pulse 67, temperature 98 °F (36.7 °C), resp. rate 19, height 5' 6\" (1.676 m), weight 59 kg (130 lb), SpO2 100%.          Imaging and other studies: Results Review Statement: No pertinent imaging studies reviewed.          Plan    Respiratory Plan: Discontinue Protocol  Airway Clearance Plan: Incentive Spirometer     Resp Comments: Pt in hospital following a right thorascopic blebectomy, pt denies any pulm history or taking pulm meds at home. Pt not in resp distress, bs clear/diminished. Pt shown how to use IS, pt told how often to use it. Pt displayed proper technique and understanding. Will dc resp protocol and leave acp for IS.   "

## 2025-02-19 NOTE — DISCHARGE INSTR - AVS FIRST PAGE
You underwent a minimally invasive thoracic surgery. Below are your discharge instructions.     Incision Care:  - Your incisions were closed with stitches underneath of the skin which will dissolve. There is purple surgical glue on top of the incisions. The surgical glue will flake off over the next few weeks. There is a non-dissolvable stitch at your chest tube site which will be removed in the office.   - After your chest tube was removed, a gauze dressing was placed over the incision. This gauze can be removed in 48 hours. After this time you may place some gauze over your chest tube site if it is leaking some fluid.  - You do not need dressings over your other incisions.  Do not apply any cream or ointments to the incisions unless instructed by your surgeon.  - You may shower daily - no soaking in a tub bath. Wash your incisions gently with soap and water and pat dry. Do not scrub the incisions.  - Bruising at your incisions is normal. This will resolve over the next few weeks.     Activity  - No lifting greater than 10lbs until you are evaluated in the office.   - Walking and light activity is encouraged. Recommend you are active during the day and using your Incentive Spirometer when you are sitting for a prolonged period.   - No driving while you are using narcotic pain medications. If you are no longer taking narcotics and considering driving, you must make sure you can quickly react to changes on the road. This can be challenging in the first few weeks after surgery.     Pain:  - Some degree of pain or discomfort after surgery is expected. This pain may become more noticeable after discharge as you start moving around more.   - Your pain regiment includes the following:   - Tylenol 650 mg tablet. Take 1 tablet, every 6 hours for 1 week.     - Gabapentin 300 mg tablet. Take 1 tablet, 3x a day for 3 weeks.   - Oxycodone (Narcotic) 5mg tablet. Take 1 tablet, every 4-6 hours as needed for pain.     Please do  NOT take ibuprofen based products (such as Motrin, Aleve) for the next 3 weeks.     Medications:  - Continue on your home medications including any blood thinner and aspirin, as instructed.     Diet:  - You should continue on your normal diet.     Bowel Regiment:  - Constipation after surgery while on narcotic pain medications is normal.  - You should continue taking a bowel regiment while on a narcotic pain medication to keep your bowel movements soft. Your discharge bowel regiment:   - Docusate (Colace) 100mg tablet. Take 1 tablet, twice a day.    - Senna 8.6mg tablet. Take 1 tablet daily.   - If your bowel movements become lose, stop taking the bowel regiment above.     Follow-up:  - You have a scheduled follow-up appointment on: 3/4/25 at 9:15am  - Obtain your Chest X-ray prior to your scheduled post-operative appointment.   - A couple of days after discharge our office will call you to check in with how you are recovering at home.     Concerns:  - Call the office for any questions or concerns. Many postoperative issues can be sorted out over the phone.   - Go to the Emergency Department if you have shortness of breath and/or new onset chest pain.   - Call the office or go to the Emergency Department if you develop a fever greater than 101, persistent chills, persistent nausea/vomiting, worsening or uncontrolled pain, and/or increasing redness or foul smelling drainage from an incision.     Thoracic Surgery Office: [phone]    Dr. William Burfeind, MD Rachael Hart, PA-C Dr. Meredith Harrison, MD Amylyn Mortimer, PA-C Dr. Dustin Manchester, MD Dr. Stephen Dingley, DO

## 2025-02-19 NOTE — ANESTHESIA POSTPROCEDURE EVALUATION
Post-Op Assessment Note    CV Status:  Stable    Pain management: adequate       Mental Status:  Alert and awake   Hydration Status:  Euvolemic   PONV Controlled:  Controlled   Airway Patency:  Patent     Post Op Vitals Reviewed: Yes    No anethesia notable event occurred.    Staff: Anesthesiologist, CRNA           Last Filed PACU Vitals:  Vitals Value Taken Time   Temp     Pulse 95    /94    Resp 22    SpO2 98

## 2025-02-19 NOTE — OP NOTE
OPERATIVE REPORT  PATIENT NAME: Jaswant Saunders    :  1995  MRN: [id number]  Pt Location: BE OR ROOM 08    SURGERY DATE: 2025    Surgeons and Role:     * Karolyn Scott MD - Primary     * Eduard Murray MD - Assisting    Preop Diagnosis:  Recurrent spontaneous pneumothorax [J93.83]    Post-Op Diagnosis Codes:     * Recurrent spontaneous pneumothorax [J93.83]    Procedure(s):  Right - right thoracoscopic blebectomy with mechanical pleurodesis  BRONCHOSCOPY FLEXIBLE    Specimen(s):  ID Type Source Tests Collected by Time Destination   1 : RIGHT Apical Wedge Tissue Lung TISSUE EXAM Karolyn Scott MD 2025 1503    2 : RUL Wedge Tissue Lung, Right Upper Lobe TISSUE EXAM Karolyn Scott MD 2025 1508        Estimated Blood Loss:   Minimal    Drains:  Chest Tube 1 Right Midaxillary (Active)   Number of days: 1106       Chest Tube Right (Active)   Number of days: 114       Chest Tube Right 24 Fr. (Active)   Number of days: 0       Anesthesia Type:   General    Operative Indications:  Recurrent spontaneous pneumothorax [J93.83]    Operative Findings:  Multiple small blebs at the apex and one in the right upper lobe, taken separately      Complications:   None    Procedure and Technique:  INDICATION:       Jaswant Saunders is a 29 y.o. male who presented with a spontaneous pneumothorax twice. He was managed conservatively both times with a chest tube and presented to thoracic surgery as an outpatient to discuss elective blebectomy and pleurodesis. It was decided to take him to the operating room for a blebectomy and mechanical pleurodesis. The potential risks and benefits of the surgery were explained to the patient and he elected to proceed. All of his questions were answered.      OPERATION IN DETAIL:    The patient was correctly identified by name and medical record number in the holding area and brought to the operative suite, where he was placed supine on the operative table.      The  patient was positioned in the left lateral decubitus position, prepped and draped in the usual fashion. A time-out was performed to confirm procedure and laterality. A 2cm incision was made in the 8th intercostal space, in the posterior axillary line. This was carried down to the pleura and the thoracic cavity was entered. The thoracoscope was introduced and the cavity explored. A working port, about 3cm in length, was then created in the 4th intercostal space in the anterior axillary line. In addition, a third port was created in the 7th intercostal space in line with the scapula tip. An intercostal nerve block was then applied using Exparel in rib spaces 3-10.      The apex of the lung was grasped with a Mcintyre grasper. At least one bleb was identified at the apex. This was then resected using serial firings of a tissue load stapler. The staple line was hemostatic. There was an additional smaller bleb in the right upper lobe. This was also resected using serial firings of a tissue load stapler. The lung was fully mobilized and no additional blebs were identified, including in the superior segment.     At this point, attention was turned to the pleurodesis. Using a Bovie scratch pad, the parietal pleura was de-epithelialized in order to perform a complete mechanical pleurodesis. This was performed over all anterior and posterior surfaces, in addition to the apex and diaphragmatic surface.        The resection bed and all dissected areas were examined and hemostasis was deemed as adequate. A posterior apical 24F chest tube was placed and secured to the skin with 0 prolene sutures. The lung was re-expanded under direct visualization. The incisions were closed in layers using Vicryl suture followed by subcuticular monofilament suture. Dermabond was applied to the wounds.      At the end of the procedure, the instrument, sponge and needle counts were confirmed to be correct x2.      The patient tolerated the  procedure well and was delivered to the PACU.    I was present for the entire procedure.    Patient Disposition:  PACU  and extubated and stable             SIGNATURE: Karolyn Scott MD  DATE: February 19, 2025  TIME: 3:35 PM

## 2025-02-19 NOTE — PLAN OF CARE
Problem: PAIN - ADULT  Goal: Verbalizes/displays adequate comfort level or baseline comfort level  Description: Interventions:  - Encourage patient to monitor pain and request assistance  - Assess pain using appropriate pain scale  - Administer analgesics based on type and severity of pain and evaluate response  - Implement non-pharmacological measures as appropriate and evaluate response  - Consider cultural and social influences on pain and pain management  - Notify physician/advanced practitioner if interventions unsuccessful or patient reports new pain  2/19/2025 1833 by Kika Pedroza RN  Outcome: Progressing  2/19/2025 1832 by Kika Pedroza RN  Outcome: Progressing     Problem: DISCHARGE PLANNING  Goal: Discharge to home or other facility with appropriate resources  Description: INTERVENTIONS:  - Identify barriers to discharge w/patient and caregiver  - Arrange for needed discharge resources and transportation as appropriate  - Identify discharge learning needs (meds, wound care, etc.)  - Arrange for interpretive services to assist at discharge as needed  - Refer to Case Management Department for coordinating discharge planning if the patient needs post-hospital services based on physician/advanced practitioner order or complex needs related to functional status, cognitive ability, or social support system  Outcome: Progressing     Problem: Knowledge Deficit  Goal: Patient/family/caregiver demonstrates understanding of disease process, treatment plan, medications, and discharge instructions  Description: Complete learning assessment and assess knowledge base.  Interventions:  - Provide teaching at level of understanding  - Provide teaching via preferred learning methods  Outcome: Progressing     Problem: RESPIRATORY - ADULT  Goal: Achieves optimal ventilation and oxygenation  Description: INTERVENTIONS:  - Assess for changes in respiratory status  - Assess for changes in mentation and behavior  -  Position to facilitate oxygenation and minimize respiratory effort  - Oxygen administered by appropriate delivery if ordered  - Initiate smoking cessation education as indicated  - Encourage broncho-pulmonary hygiene including cough, deep breathe, Incentive Spirometry  - Assess the need for suctioning and aspirate as needed  - Assess and instruct to report SOB or any respiratory difficulty  - Respiratory Therapy support as indicated  2/19/2025 1833 by Kika Pedroza, RN  Outcome: Progressing  2/19/2025 1832 by Kika Pedroza RN  Outcome: Progressing

## 2025-02-20 LAB
ANION GAP SERPL CALCULATED.3IONS-SCNC: 6 MMOL/L (ref 4–13)
BUN SERPL-MCNC: 13 MG/DL (ref 5–25)
CALCIUM SERPL-MCNC: 9.1 MG/DL (ref 8.4–10.2)
CHLORIDE SERPL-SCNC: 105 MMOL/L (ref 96–108)
CO2 SERPL-SCNC: 28 MMOL/L (ref 21–32)
CREAT SERPL-MCNC: 0.89 MG/DL (ref 0.6–1.3)
ERYTHROCYTE [DISTWIDTH] IN BLOOD BY AUTOMATED COUNT: 12 % (ref 11.6–15.1)
GFR SERPL CREATININE-BSD FRML MDRD: 114 ML/MIN/1.73SQ M
GLUCOSE SERPL-MCNC: 113 MG/DL (ref 65–140)
HCT VFR BLD AUTO: 45.5 % (ref 36.5–49.3)
HGB BLD-MCNC: 15.3 G/DL (ref 12–17)
MAGNESIUM SERPL-MCNC: 2 MG/DL (ref 1.9–2.7)
MCH RBC QN AUTO: 31 PG (ref 26.8–34.3)
MCHC RBC AUTO-ENTMCNC: 33.6 G/DL (ref 31.4–37.4)
MCV RBC AUTO: 92 FL (ref 82–98)
PLATELET # BLD AUTO: 188 THOUSANDS/UL (ref 149–390)
PMV BLD AUTO: 9.7 FL (ref 8.9–12.7)
POTASSIUM SERPL-SCNC: 4.1 MMOL/L (ref 3.5–5.3)
RBC # BLD AUTO: 4.94 MILLION/UL (ref 3.88–5.62)
SODIUM SERPL-SCNC: 139 MMOL/L (ref 135–147)
WBC # BLD AUTO: 10.67 THOUSAND/UL (ref 4.31–10.16)

## 2025-02-20 PROCEDURE — 94664 DEMO&/EVAL PT USE INHALER: CPT

## 2025-02-20 PROCEDURE — 97166 OT EVAL MOD COMPLEX 45 MIN: CPT

## 2025-02-20 PROCEDURE — 80048 BASIC METABOLIC PNL TOTAL CA: CPT

## 2025-02-20 PROCEDURE — 99024 POSTOP FOLLOW-UP VISIT: CPT | Performed by: THORACIC SURGERY (CARDIOTHORACIC VASCULAR SURGERY)

## 2025-02-20 PROCEDURE — 83735 ASSAY OF MAGNESIUM: CPT

## 2025-02-20 PROCEDURE — 85027 COMPLETE CBC AUTOMATED: CPT

## 2025-02-20 PROCEDURE — 97162 PT EVAL MOD COMPLEX 30 MIN: CPT

## 2025-02-20 RX ADMIN — SENNOSIDES 8.6 MG: 8.6 TABLET ORAL at 08:47

## 2025-02-20 RX ADMIN — GABAPENTIN 300 MG: 300 CAPSULE ORAL at 17:28

## 2025-02-20 RX ADMIN — GABAPENTIN 300 MG: 300 CAPSULE ORAL at 08:47

## 2025-02-20 RX ADMIN — ENOXAPARIN SODIUM 40 MG: 40 INJECTION SUBCUTANEOUS at 08:47

## 2025-02-20 RX ADMIN — Medication 2.5 MG: at 17:30

## 2025-02-20 RX ADMIN — ACETAMINOPHEN 975 MG: 325 TABLET, FILM COATED ORAL at 04:00

## 2025-02-20 RX ADMIN — DOCUSATE SODIUM 100 MG: 100 CAPSULE, LIQUID FILLED ORAL at 08:48

## 2025-02-20 RX ADMIN — ACETAMINOPHEN 975 MG: 325 TABLET, FILM COATED ORAL at 21:00

## 2025-02-20 RX ADMIN — ACETAMINOPHEN 975 MG: 325 TABLET, FILM COATED ORAL at 09:43

## 2025-02-20 RX ADMIN — PANTOPRAZOLE SODIUM 40 MG: 40 TABLET, DELAYED RELEASE ORAL at 05:43

## 2025-02-20 RX ADMIN — GABAPENTIN 300 MG: 300 CAPSULE ORAL at 21:00

## 2025-02-20 RX ADMIN — DOCUSATE SODIUM 100 MG: 100 CAPSULE, LIQUID FILLED ORAL at 17:31

## 2025-02-20 NOTE — QUICK NOTE
Thoracic Surgery Postop Check:    Procedure: Right thoracoscopic blebectomy with mechanical pleurodesis    Subjective:  No acute distress.  Resting comfortably in bed.  Pain controlled.  Patient denies nausea, vomiting, fever, chills, shortness of breath, chest pain.  Tolerating diet, voiding.  Passing flatus.    1000 cc on IS    R CT with 200 cc serosanguinous output, -20, -AL    Objective  Vitals:    02/19/25 1715 02/19/25 1730 02/19/25 1915 02/19/25 2239   BP: 139/91 136/88 109/71 118/79   BP Location:   Left arm    Pulse: 67 67 72 62   Resp: 22 19 15    Temp:  98 °F (36.7 °C) (!) 97 °F (36.1 °C) (!) 97.3 °F (36.3 °C)   TempSrc:   Oral    SpO2: 100% 100%  100%   Weight:       Height:           GENERAL: No acute distress  CV: Regular rate  LUNGS: Nonlabored respirations on RA  Incisions cdi. CT site with scant ss drainage around  ABDOMEN: Abdomen soft, non-tender, nondistended  EXTREMITIES: No edema bilateral lower extremities     ---  Liudmila Carbone MD  General Surgery PGY-II

## 2025-02-20 NOTE — OCCUPATIONAL THERAPY NOTE
Occupational Therapy Evaluation     Patient Name: Jaswant Saunders  Today's Date: 2/20/2025  Problem List  Principal Problem:    Recurrent spontaneous pneumothorax  Active Problems:    Former smoker    Past Medical History  Past Medical History:   Diagnosis Date    Pneumothorax     Aug 2017, Nov 2024     Past Surgical History  Past Surgical History:   Procedure Laterality Date    ABDOMINAL WALL SURGERY N/A 11/27/2018    Procedure: Intermediate  CLOSURE WOUND  2 cm left neck;  Surgeon: Berto Chavez MD;  Location: [facility];  Service: General    FACIAL/NECK BIOPSY Left 11/27/2018    Procedure: NECK MASS EXCISION BIOPSY;  Surgeon: Berto Chavez MD;  Location: [facility];  Service: General    KNEE SURGERY      ACL           02/20/25 0903   OT Last Visit   OT Visit Date 02/20/25   Note Type   Note type Evaluation   Additional Comments Pt seen w/ PT to increase safety, decrease fall risk, and maximize functional/occupational performance 2* medical complexity which is a regression from pt's functional baseline.   Pain Assessment   Pain Assessment Tool 0-10   Pain Score 2   Pain Location/Orientation Location: Incision;Location: Chest  (Chest tube incision)   Effect of Pain on Daily Activities Impacts ability to engage in valued occupations   Hospital Pain Intervention(s) Repositioned;Ambulation/increased activity;Emotional support   Restrictions/Precautions   Weight Bearing Precautions Per Order No   Other Precautions Multiple lines;Telemetry;Pain  (Chest tube)   Home Living   Type of Home House  (2  with 2 FLORY)   Home Layout Two level;1/2 bath on main level;Bed/bath upstairs;Performs ADLs on one level;Able to live on main level with bedroom/bathroom;Access   Bathroom Shower/Tub Tub/shower unit   Bathroom Toilet Standard   Bathroom Equipment Grab bars in shower   Bathroom Accessibility Accessible   Home Equipment Other (Comment)  (No DME)   Additional Comments Pt reports living with his wife and 1 year old son in a 2  "SH with 2 FLORY, 1/2 on first with full bathroom/bedroom on 2nd floor; no DME PTA   Prior Function   Level of [place] Independent with ADLs;Independent with functional mobility;Independent with IADLS   Lives With Spouse;Family;Son   Receives Help From Family   IADLs Independent with driving;Independent with meal prep;Independent with medication management   Falls in the last 6 months 0   Vocational Full time employment   Comments (+) driving   Lifestyle   Autonomy PTA, pt was independent in ADLs/IADLs and uses no DME for functional mobility; (+) driving   Reciprocal Relationships Lives with his wife and 1 year old son   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   Intrinsic Gratification Enjoys spending time with his family and son   General   Family/Caregiver Present Yes   Additional General Comments Pt's wife present   Subjective   Subjective \"I am feeling good!\"   ADL   Where Assessed Edge of bed   Eating Assistance 7  Independent   Grooming Assistance 7  Independent   UB Bathing Assistance 7  Independent   LB Bathing Assistance 7  Independent   UB Dressing Assistance 7  Independent   LB Dressing Assistance 7  Independent   Toileting Assistance  7  Independent   Functional Assistance 7  Independent   Bed Mobility   Supine to Sit Unable to assess   Sit to Supine Unable to assess   Additional Comments Upon arrival, pt found sitting upright on EOB; @ end of session, pt left sitting upright on EOB with all functional needs in reach with wife present in room   Transfers   Sit to Stand 7  Independent   Stand to Sit 7  Independent   Additional Comments w/ no DME   Functional Mobility   Functional Mobility 7  Independent   Additional Comments Pt completed long household functional mobility distances @ an independent level w/ no DME   Balance   Static Sitting Normal   Dynamic Sitting Normal   Static Standing Normal   Dynamic Standing Normal   Ambulatory Good   Activity Tolerance   Activity Tolerance " "Patient tolerated treatment well;Patient limited by fatigue   Medical Staff Made Aware LUDIVINA Padilla   Nurse Made Aware RN cleared   RUE Assessment   RUE Assessment WFL   LUE Assessment   LUE Assessment WFL   Hand Function   Gross Motor Coordination Functional   Fine Motor Coordination Functional   Cognition   Overall Cognitive Status WFL   Arousal/Participation Alert;Responsive;Arousable;Cooperative   Attention Within functional limits   Orientation Level Oriented X4   Memory Within functional limits   Following Commands Follows all commands and directions without difficulty   Comments Pt pleasant and cooperative   Assessment   Prognosis Good   Assessment Pt is a 30 yo male admitted to [facility] s/p \"Right - right thoracoscopic blebectomy with mechanical pleurodesis BRONCHOSCOPY FLEXIBLE on 2/19 2* recurrent spontaneous pneumothorax. Pt  has a past medical history of Pneumothorax. Pt with active OT orders and OT consulted to assess pt's functional status and occupational performance to determine safe d/c needs. Pt seen with PT to increase safety, decrease fall risk, and maximize functional/occupational performance 2* medical complexity which is a regression from pt's functional baseline. Pt lives with his wife in a 2  with 2 FLORY. PTA, pt was independent in ADLs/IADLs and uses no DME for functional mobility. (+) driving. Discussed role and scope of OT in which pt was receptive. At this time, pt is not demonstrating any significant occupational deficits and is functioning at a level of independent for functional transfers/mobility w/ no DME and UB/LB ADLs. From an OT standpoint, recommend discharge to home with increased support once medically stable. Pt was receptive regarding education on returning home safely with energy conservation techniques and demonstrated good carryover during occupational/functional performance. At this time, pt demonstrates good insight/safety awareness and does not express any concerns " regarding performing ADL/IADL/functional mobility tasks. No further skilled acute care OT services are needed at this time. The patient's raw score on the AM-PAC Daily Activity Inpatient Short Form is 24. A raw score of greater than or equal to 19 suggests the patient may benefit from discharge to home. Please refer to the recommendation of the Occupational Therapist for safe discharge planning.  Recommend continued engagement in ADL/functional mobility tasks with nursing and restorative therapy staff as appropriate to promote the highest level of independence prior to discharge. OT is discharging pt from caseload at this time, please reconsult if needed.   Goals   Patient Goals To go home   Plan   OT Frequency Eval only   Discharge Recommendation   Rehab Resource Intensity Level, OT No post-acute rehabilitation needs   AM-PAC Daily Activity Inpatient   Lower Body Dressing 4   Bathing 4   Toileting 4   Upper Body Dressing 4   Grooming 4   Eating 4   Daily Activity Raw Score 24   Daily Activity Standardized Score (Calc for Raw Score >=11) 57.54   AM-PAC Applied Cognition Inpatient   Following a Speech/Presentation 4   Understanding Ordinary Conversation 4   Taking Medications 4   Remembering Where Things Are Placed or Put Away 4   Remembering List of 4-5 Errands 4   Taking Care of Complicated Tasks 4   Applied Cognition Raw Score 24   Applied Cognition Standardized Score 62.21   End of Consult   Education Provided Yes   Patient Position at End of Consult Seated edge of bed;All needs within reach   Nurse Communication Nurse aware of consult     Fatimah Begum MS, OTR/L

## 2025-02-20 NOTE — UTILIZATION REVIEW
Initial Clinical Review    Elective Inpatient surgical procedure  Age/Sex: 29 y.o. male  Surgery Date: 2/19/2025   Procedure:   Right - right thoracoscopic blebectomy with mechanical pleurodesis  BRONCHOSCOPY FLEXIBLE  Anesthesia: General  Operative Findings: Multiple small blebs at the apex and one in the right upper lobe, taken separately       POD#1 Progress Note: Pain controlled.  Tolerate p.o.  No nausea, vomiting, fever, chills, shortness breath, chest pain.  Voiding and passing flatus. CT - 350 out, serosang, no air leak.  Continue diet as tolerated. Discontinue IV fluids. Maintain chest tube to suction for three days. Pain and nausea control PRN. Encourage IS, ambulation. PT/OT eval and treat. DVT ppx.    Admission Orders: Date/Time/Statement:   Admission Orders (From admission, onward)       Ordered        02/19/25 1559  Inpatient Admission  Once                          Orders Placed This Encounter   Procedures    Inpatient Admission     Standing Status:   Standing     Number of Occurrences:   1     Level of Care:   Med Surg [16]     Bed request comments:   p5     Estimated length of stay:   Inpatient Only Surgery     Diet: Regular House  Mobility: Ambulate   DVT Prophylaxis: SCD, Enoxaparin SC    Medications/Pain Control:   Scheduled Medications:  acetaminophen, 975 mg, Oral, Q6H  cefazolin, 2,000 mg, Intravenous, Once  docusate sodium, 100 mg, Oral, BID  enoxaparin, 40 mg, Subcutaneous, Daily  gabapentin, 300 mg, Oral, TID  pantoprazole, 40 mg, Oral, Early Morning  senna, 1 tablet, Oral, Daily      Continuous IV Infusions:  lactated ringers infusion  Rate: 60 mL/hr Dose: 60 mL/hr  Freq: Continuous Route: IV  Last Dose: 60 mL/hr (02/19/25 1630)  Start: 02/19/25 1600 End: 02/20/25 0744       PRN Meds:  HYDROmorphone, 0.5 mg, Intravenous, Q3H PRN  ondansetron, 4 mg, Intravenous, Q6H PRN  oxyCODONE, 5 mg, Oral, Q4H PRN  oxyCODONE, 2.5 mg, Oral, Q4H PRN 02/19 x 1  polyethylene glycol, 17 g, Oral, Daily  PRN      Vital Signs (last 3 days)       Date/Time Temp Pulse Resp BP MAP (mmHg) SpO2 Calculated FIO2 (%) - Nasal Cannula O2 Flow Rate (L/min) Nasal Cannula O2 Flow Rate (L/min) O2 Device Cardiac (WDL) Patient Position - Orthostatic VS Stuyvesant Coma Scale Score Pain    02/20/25 07:14:32 98.4 °F (36.9 °C) 57 16 111/64 80 99 % -- -- -- -- -- Lying -- --    02/20/25 0400 -- -- -- -- -- -- -- -- -- -- -- -- -- 4    02/20/25 02:48:41 98.7 °F (37.1 °C) 56 16 111/58 76 100 % -- -- -- None (Room air) -- Lying -- --    02/19/25 22:39:39 97.3 °F (36.3 °C) 62 -- 118/79 92 100 % -- -- -- None (Room air) -- -- -- --    02/19/25 2225 -- -- -- -- -- -- -- -- -- -- -- -- -- 6    02/19/25 2108 -- -- -- -- -- -- -- -- -- -- -- -- -- 3    02/19/25 2015 -- -- -- -- -- -- -- -- -- -- -- -- 15 --    02/19/25 1915 97 °F (36.1 °C) 72 15 109/71 -- -- -- -- -- -- -- Lying -- --    02/19/25 1815 -- -- -- -- -- -- -- -- -- -- -- -- 15 --    02/19/25 1801 -- -- -- -- -- -- -- -- -- -- -- -- -- 3    02/19/25 1730 98 °F (36.7 °C) 67 19 136/88 106 100 % 36 -- 4 L/min Nasal cannula WDL -- 15 2    02/19/25 1715 -- 67 22 139/91 111 100 % -- -- -- -- -- -- 15 2    02/19/25 1710 -- -- -- -- -- -- -- -- -- -- -- -- -- 6    02/19/25 1705 -- -- -- -- -- -- -- -- -- -- -- -- -- 7    02/19/25 1700 -- 66 22 142/96 115 100 % -- -- -- -- -- -- -- 7    02/19/25 1645 -- 73 22 140/92 110 100 % 36 -- 4 L/min Nasal cannula -- -- 14 No Pain    02/19/25 1630 -- 93 24 139/100 112 99 % -- -- -- -- -- -- -- --    02/19/25 1624 97.5 °F (36.4 °C) 86 22 139/94 113 99 % -- 6 L/min -- Simple mask WDL -- 8 --    02/19/25 1208 97.9 °F (36.6 °C) 70 16 125/83 -- 100 % -- -- -- None (Room air) -- -- -- No Pain          Weight (last 2 days)       Date/Time Weight    02/20/25 0540 57.6 (127)    02/19/25 1208 59 (130)            Pertinent Labs/Diagnostic Test Results:   Radiology:  XR chest portable   Final Interpretation by Carmencita Weaver MD (02/19 8651)      Trace right apical  pneumothorax following chest tube placement.            Workstation performed: [id number]           Cardiology:  No orders to display     GI:  No orders to display           Results from last 7 days   Lab Units 02/20/25  0522 02/19/25  1643   WBC Thousand/uL 10.67* 8.49   HEMOGLOBIN g/dL 15.3 17.1*   HEMATOCRIT % 45.5 52.2*   PLATELETS Thousands/uL 188 173         Results from last 7 days   Lab Units 02/20/25  0522 02/19/25  1643   SODIUM mmol/L 139 139   POTASSIUM mmol/L 4.1 4.8   CHLORIDE mmol/L 105 107   CO2 mmol/L 28 27   ANION GAP mmol/L 6 5   BUN mg/dL 13 11   CREATININE mg/dL 0.89 0.91   EGFR ml/min/1.73sq m 114 112   CALCIUM mg/dL 9.1 9.4   MAGNESIUM mg/dL 2.0 2.1             Results from last 7 days   Lab Units 02/20/25  0522 02/19/25  1643   GLUCOSE RANDOM mg/dL 113 102           Network Utilization Review Department  ATTENTION: Please call with any questions or concerns to [phone] and carefully listen to the prompts so that you are directed to the right person. All voicemails are confidential.   For Discharge needs, contact Care Management DC Support Team at [phone] opt. 2  Send all requests for admission clinical reviews, approved or denied determinations and any other requests to dedicated fax number below belonging to the campus where the patient is receiving treatment. List of dedicated fax numbers for the Facilities:  FACILITY NAME UR FAX NUMBER   ADMISSION DENIALS (Administrative/Medical Necessity) [phone]   DISCHARGE SUPPORT TEAM (NETWORK) [phone]   PARENT CHILD HEALTH (Maternity/NICU/Pediatrics) [phone]   [facility] [phone]   [facility] [phone]   [facility] [phone]   [facility] [phone]   [facility] [phone]   [facility] [phone]   [facility] [phone]   [facility]  [facility] [phone]   [facility] [phone]   Critical access hospital [phone]   [facility] [phone]   [facility] [phone]

## 2025-02-20 NOTE — PROGRESS NOTES
Progress Note - Thoracic    Name: Jaswant Saunders 29 y.o. male I MRN: [id number]  [facility] I Date of Admission: 2/19/2025   Date of Service: 2/20/2025 I Hospital Day: 1    Assessment & Plan  Recurrent spontaneous pneumothorax  29-year-old male with history of spontaneous pneumothorax.  Now status post 2/19 VATS blebectomy and mechanical pleurodesis.    Right chest tube to thopaz unit  350 cc serosanguineous  - 20, -AL    IS 1000    Plan:   - Continue diet as tolerated  - Discontinue IV fluids  - Maintain chest tube to suction for three days   - Pain and nausea control PRN  - Encourage IS, ambulation   - PT/OT eval and treat   - Appreciate CM for dispo planning   - DVT ppx    Subjective : Pain controlled.  Tolerate p.o.  No nausea, vomiting, fever, chills, shortness breath, chest pain.  Voiding and passing flatus.    Objective :  Temp:  [97 °F (36.1 °C)-98.7 °F (37.1 °C)] 98.7 °F (37.1 °C)  HR:  [56-93] 56  BP: (109-142)/() 111/58  Resp:  [15-24] 16  SpO2:  [99 %-100 %] 100 %  O2 Device: None (Room air)  Nasal Cannula O2 Flow Rate (L/min):  [4 L/min] 4 L/min    I/O         02/18 0701  02/19 0700 02/19 0701  02/20 0700    I.V. (mL/kg)  800 (13.6)    Total Intake(mL/kg)  800 (13.6)    Urine (mL/kg/hr)  500    Chest Tube  200    Total Output  700    Net  +100                Lines/Drains/Airways       Active Status       Name Placement date Placement time Site Days    Chest Tube Right Pleural 24 Fr. 02/19/25  1534  Pleural  less than 1                  Physical Exam  Gen: NAD, Resting in bed  Neuro: A&O, No focal deficits  Head: Normal Cephalic, Atraumatic  Eye: EOMI, No scleral icterus  CV: Regular rate, Cap refill <2 sec  Pulm: Normal work of breathing, No respiratory distress on room air  Chest tube in place with serosanguinous output. Incisions cdi.   Abd: Soft, Non-tender, Non-distended   Ext: No edema bilateral lower extremities, Non-tender  Skin: Warm, Dry, Intact      Lab Results: I have reviewed  the following results:  Recent Labs     02/19/25  1643 02/20/25  0522   WBC 8.49 10.67*   HGB 17.1* 15.3   HCT 52.2* 45.5    188   SODIUM 139  --    K 4.8  --      --    CO2 27  --    BUN 11  --    CREATININE 0.91  --    GLUC 102  --    MG 2.1  --      XR chest portable   Final Result by Carmencita Weaver MD (02/19 9601)      Trace right apical pneumothorax following chest tube placement.            Workstation performed: [id number]           VTE Pharmacologic Prophylaxis: VTE covered by:  enoxaparin, Subcutaneous      VTE Mechanical Prophylaxis: sequential compression device    ---  Liudmila Carbone MD  General Surgery PGY-II

## 2025-02-20 NOTE — UTILIZATION REVIEW
"NOTIFICATION OF INPATIENT ADMISSION   AUTHORIZATION REQUEST   SERVICING FACILITY:   [facility]  Address: [address] [place] [address]  Tax ID: [id number]  NPI: [id number] ATTENDING PROVIDER:  Attending Name and NPI#: Karolyn Scott Md [[id number]]  Address: [address] [place] [address]  Phone: [phone]   ADMISSION INFORMATION:  Place of Service: Inpatient [facility]  Place of Service Code: 21  Inpatient Admission Date/Time: 2/19/25  3:59 PM  Discharge Date/Time: No discharge date for patient encounter.  Admitting Diagnosis Code/Description:  Recurrent spontaneous pneumothorax [J93.83]     UTILIZATION REVIEW CONTACT:  Kristie Milan"Idalmis\"Ronnie Utilization   Network Utilization Review Department  Phone: [phone]  Fax: [phone]  Email: [email or web address]  Contact for approvals/pending authorizations, clinical reviews, and discharge.     PHYSICIAN ADVISORY SERVICES:  Medical Necessity Denial & Alme-bx-Ibil Review  Phone: [phone]  Fax: [phone]  Email: [email or web address]     DISCHARGE SUPPORT TEAM:  For Patients Discharge Needs & Updates  Phone: [phone] opt. 2 Fax: [phone]  Email: [email or web address]     "

## 2025-02-20 NOTE — ASSESSMENT & PLAN NOTE
29-year-old male with history of spontaneous pneumothorax.  Now status post 2/19 VATS blebectomy and mechanical pleurodesis.    Right chest tube to thopaz unit  350 cc serosanguineous  - 20, -AL    IS 1000    Plan:   - Continue diet as tolerated  - Discontinue IV fluids  - Maintain chest tube to suction for three days   - Pain and nausea control PRN  - Encourage IS, ambulation   - PT/OT eval and treat   - Appreciate CM for dispo planning   - DVT ppx

## 2025-02-21 LAB
ANION GAP SERPL CALCULATED.3IONS-SCNC: 8 MMOL/L (ref 4–13)
BUN SERPL-MCNC: 14 MG/DL (ref 5–25)
CALCIUM SERPL-MCNC: 9.2 MG/DL (ref 8.4–10.2)
CHLORIDE SERPL-SCNC: 104 MMOL/L (ref 96–108)
CO2 SERPL-SCNC: 29 MMOL/L (ref 21–32)
CREAT SERPL-MCNC: 0.85 MG/DL (ref 0.6–1.3)
GFR SERPL CREATININE-BSD FRML MDRD: 116 ML/MIN/1.73SQ M
GLUCOSE SERPL-MCNC: 104 MG/DL (ref 65–140)
POTASSIUM SERPL-SCNC: 4 MMOL/L (ref 3.5–5.3)
SODIUM SERPL-SCNC: 141 MMOL/L (ref 135–147)

## 2025-02-21 PROCEDURE — 99024 POSTOP FOLLOW-UP VISIT: CPT | Performed by: SURGERY

## 2025-02-21 PROCEDURE — 94664 DEMO&/EVAL PT USE INHALER: CPT

## 2025-02-21 PROCEDURE — 80048 BASIC METABOLIC PNL TOTAL CA: CPT

## 2025-02-21 RX ORDER — OXYCODONE HYDROCHLORIDE 5 MG/1
5 TABLET ORAL EVERY 4 HOURS PRN
Qty: 20 TABLET | Refills: 0 | Status: SHIPPED | OUTPATIENT
Start: 2025-02-21

## 2025-02-21 RX ORDER — ACETAMINOPHEN 325 MG/1
650 TABLET ORAL EVERY 6 HOURS
Qty: 56 TABLET | Refills: 0 | Status: SHIPPED | OUTPATIENT
Start: 2025-02-21 | End: 2025-02-28

## 2025-02-21 RX ORDER — GABAPENTIN 300 MG/1
300 CAPSULE ORAL 3 TIMES DAILY
Qty: 63 CAPSULE | Refills: 0 | Status: SHIPPED | OUTPATIENT
Start: 2025-02-21 | End: 2025-03-14

## 2025-02-21 RX ADMIN — ACETAMINOPHEN 975 MG: 325 TABLET, FILM COATED ORAL at 16:13

## 2025-02-21 RX ADMIN — DOCUSATE SODIUM 100 MG: 100 CAPSULE, LIQUID FILLED ORAL at 08:35

## 2025-02-21 RX ADMIN — ACETAMINOPHEN 975 MG: 325 TABLET, FILM COATED ORAL at 03:36

## 2025-02-21 RX ADMIN — GABAPENTIN 300 MG: 300 CAPSULE ORAL at 08:35

## 2025-02-21 RX ADMIN — ACETAMINOPHEN 975 MG: 325 TABLET, FILM COATED ORAL at 09:27

## 2025-02-21 RX ADMIN — GABAPENTIN 300 MG: 300 CAPSULE ORAL at 22:06

## 2025-02-21 RX ADMIN — PANTOPRAZOLE SODIUM 40 MG: 40 TABLET, DELAYED RELEASE ORAL at 05:31

## 2025-02-21 RX ADMIN — ENOXAPARIN SODIUM 40 MG: 40 INJECTION SUBCUTANEOUS at 08:35

## 2025-02-21 RX ADMIN — GABAPENTIN 300 MG: 300 CAPSULE ORAL at 16:13

## 2025-02-21 RX ADMIN — ACETAMINOPHEN 975 MG: 325 TABLET, FILM COATED ORAL at 22:06

## 2025-02-21 RX ADMIN — SENNOSIDES 8.6 MG: 8.6 TABLET ORAL at 08:35

## 2025-02-21 RX ADMIN — Medication 2.5 MG: at 16:10

## 2025-02-21 NOTE — ASSESSMENT & PLAN NOTE
29-year-old male with history of spontaneous pneumothorax.  Now status post 2/19 VATS blebectomy and mechanical pleurodesis.    Plan:   - Continue diet as tolerated  - Maintain chest tube to suction for three days   - Pain and nausea control PRN  - Encourage IS, ambulation   - PT/OT eval and treat   - Appreciate CM for dispo planning   - DVT ppx  - no further labs needed

## 2025-02-21 NOTE — PLAN OF CARE
Problem: PAIN - ADULT  Goal: Verbalizes/displays adequate comfort level or baseline comfort level  Description: Interventions:  - Encourage patient to monitor pain and request assistance  - Assess pain using appropriate pain scale  - Administer analgesics based on type and severity of pain and evaluate response  - Implement non-pharmacological measures as appropriate and evaluate response  - Consider cultural and social influences on pain and pain management  - Notify physician/advanced practitioner if interventions unsuccessful or patient reports new pain  Outcome: Progressing     Problem: RESPIRATORY - ADULT  Goal: Achieves optimal ventilation and oxygenation  Description: INTERVENTIONS:  - Assess for changes in respiratory status  - Assess for changes in mentation and behavior  - Position to facilitate oxygenation and minimize respiratory effort  - Oxygen administered by appropriate delivery if ordered  - Initiate smoking cessation education as indicated  - Encourage broncho-pulmonary hygiene including cough, deep breathe, Incentive Spirometry  - Assess the need for suctioning and aspirate as needed  - Assess and instruct to report SOB or any respiratory difficulty  - Respiratory Therapy support as indicated  Outcome: Progressing     Problem: DISCHARGE PLANNING  Goal: Discharge to home or other facility with appropriate resources  Description: INTERVENTIONS:  - Identify barriers to discharge w/patient and caregiver  - Arrange for needed discharge resources and transportation as appropriate  - Identify discharge learning needs (meds, wound care, etc.)  - Arrange for interpretive services to assist at discharge as needed  - Refer to Case Management Department for coordinating discharge planning if the patient needs post-hospital services based on physician/advanced practitioner order or complex needs related to functional status, cognitive ability, or social support system  Outcome: Progressing     Problem:  Knowledge Deficit  Goal: Patient/family/caregiver demonstrates understanding of disease process, treatment plan, medications, and discharge instructions  Description: Complete learning assessment and assess knowledge base.  Interventions:  - Provide teaching at level of understanding  - Provide teaching via preferred learning methods  Outcome: Progressing

## 2025-02-21 NOTE — PROGRESS NOTES
Progress Note - Thoracic    Name: Jaswant Saunders 29 y.o. male I MRN: [id number]  Unit/Bed#: [facility] 524-01 I Date of Admission: 2/19/2025   Date of Service: 2/21/2025 I Hospital Day: 2    Assessment & Plan  Recurrent spontaneous pneumothorax  29-year-old male with history of spontaneous pneumothorax.  Now status post 2/19 VATS blebectomy and mechanical pleurodesis.    Plan:   - Continue diet as tolerated  - Maintain chest tube to suction for three days   - Pain and nausea control PRN  - Encourage IS, ambulation   - PT/OT eval and treat   - Appreciate CM for dispo planning   - DVT ppx  - no further labs needed        24 Hour Events : no acute events overnight.  Subjective : no some mild discomfort with inspiration, scarlet PO. OOB, voiding, no sob, wheezing.    Objective :  Temp:  [98.1 °F (36.7 °C)-99.3 °F (37.4 °C)] 98.4 °F (36.9 °C)  HR:  [57-69] 69  BP: (103-129)/(64-79) 129/77  Resp:  [16-19] 18  SpO2:  [95 %-100 %] 95 %  O2 Device: None (Room air)    I/O         02/19 0701  02/20 0700 02/20 0701  02/21 0700    P.O. 240 220    I.V. (mL/kg) 800 (13.9) 987 (17.4)    Total Intake(mL/kg) 1040 (18.1) 1207 (21.3)    Urine (mL/kg/hr) 975 700 (0.5)    Chest Tube 350 75    Total Output 1325 775    Net -285 +432                Lines/Drains/Airways       Active Status       Name Placement date Placement time Site Days    Chest Tube Right Pleural 24 Fr. 02/19/25  1534  Pleural  1                  Physical Exam  Physical Exam:  General: No acute distress, alert and oriented  CV: RRR  Lungs: Normal work of breathing: R CT in placed with ss output  Abdomen: s/nt/nd  Skin: Warm, dry      Lab Results: I have reviewed the following results:  Recent Labs     02/20/25  0522 02/21/25  0521   WBC 10.67*  --    HGB 15.3  --    HCT 45.5  --      --    SODIUM 139 141   K 4.1 4.0    104   CO2 28 29   BUN 13 14   CREATININE 0.89 0.85   GLUC 113 104   MG 2.0  --              VTE Pharmacologic Prophylaxis: VTE covered by:  enoxaparin,  Subcutaneous, 40 mg at 02/20/25 0851     VTE Mechanical Prophylaxis: sequential compression device

## 2025-02-22 PROCEDURE — 99024 POSTOP FOLLOW-UP VISIT: CPT | Performed by: SURGERY

## 2025-02-22 RX ADMIN — OXYCODONE HYDROCHLORIDE 5 MG: 5 TABLET ORAL at 17:29

## 2025-02-22 RX ADMIN — PANTOPRAZOLE SODIUM 40 MG: 40 TABLET, DELAYED RELEASE ORAL at 05:30

## 2025-02-22 RX ADMIN — ACETAMINOPHEN 975 MG: 325 TABLET, FILM COATED ORAL at 21:14

## 2025-02-22 RX ADMIN — GABAPENTIN 300 MG: 300 CAPSULE ORAL at 17:27

## 2025-02-22 RX ADMIN — GABAPENTIN 300 MG: 300 CAPSULE ORAL at 21:14

## 2025-02-22 RX ADMIN — GABAPENTIN 300 MG: 300 CAPSULE ORAL at 08:47

## 2025-02-22 RX ADMIN — OXYCODONE HYDROCHLORIDE 5 MG: 5 TABLET ORAL at 05:29

## 2025-02-22 RX ADMIN — ENOXAPARIN SODIUM 40 MG: 40 INJECTION SUBCUTANEOUS at 08:47

## 2025-02-22 RX ADMIN — ACETAMINOPHEN 975 MG: 325 TABLET, FILM COATED ORAL at 10:30

## 2025-02-22 RX ADMIN — ACETAMINOPHEN 975 MG: 325 TABLET, FILM COATED ORAL at 05:29

## 2025-02-22 RX ADMIN — ACETAMINOPHEN 975 MG: 325 TABLET, FILM COATED ORAL at 17:26

## 2025-02-22 NOTE — ASSESSMENT & PLAN NOTE
29-year-old male with history of spontaneous pneumothorax.  Now status post 2/19 VATS blebectomy and mechanical pleurodesis.    Plan:   - Continue diet as tolerated  - Maintain chest tube to suction for three days through today, switch to waterseal today with subsequent CXR  - Pain and nausea control PRN  - Encourage IS, ambulation   - PT/OT eval and treat   - Appreciate CM for dispo planning   - DVT ppx  - no further labs needed

## 2025-02-22 NOTE — PROGRESS NOTES
Progress Note - Thoracic    Name: Jaswant Saunders 29 y.o. male I MRN: [id number]  Unit/Bed#: [facility] 524-01 I Date of Admission: 2/19/2025   Date of Service: 2/22/2025 I Hospital Day: 3    Assessment & Plan  Recurrent spontaneous pneumothorax  29-year-old male with history of spontaneous pneumothorax.  Now status post 2/19 VATS blebectomy and mechanical pleurodesis.    Plan:   - Continue diet as tolerated  - Maintain chest tube to suction for three days through today, switch to waterseal today with subsequent CXR  - Pain and nausea control PRN  - Encourage IS, ambulation   - PT/OT eval and treat   - Appreciate CM for dispo planning   - DVT ppx  - no further labs needed    Please contact the SecureChat role for the Thoracic  service with any questions/concerns.    24 Hour Events : none  Subjective : No acute events overnight. Patient reports pain is by chest tube. They are tolerating their diet. They are having flatus and BM. They are voiding. They are using their IS to 1500. They deny nausea, vomiting, chest pain, shortness of breath, fevers, chills.      Objective :  Temp:  [97.6 °F (36.4 °C)-98.7 °F (37.1 °C)] 98.7 °F (37.1 °C)  HR:  [63-92] 63  BP: (115-135)/(81-84) 125/81  Resp:  [16-18] 17  SpO2:  [89 %-99 %] 96 %  O2 Device: None (Room air)    I/O         02/20 0701  02/21 0700 02/21 0701  02/22 0700    P.O. 220 960    I.V. (mL/kg) 987 (17.4)     Total Intake(mL/kg) 1207 (21.3) 960 (16.9)    Urine (mL/kg/hr) 700 (0.5) 0 (0)    Chest Tube 75 75    Total Output 775 75    Net +432 +885          Unmeasured Urine Occurrence  11 x          Lines/Drains/Airways       Active Status       Name Placement date Placement time Site Days    Chest Tube Right Pleural 24 Fr. 02/19/25  1534  Pleural  2                  Physical Exam  Constitutional:       General: He is not in acute distress.  HENT:      Head: Normocephalic and atraumatic.      Right Ear: External ear normal.      Left Ear: External ear normal.      Nose: Nose normal.       Mouth/Throat:      Pharynx: Oropharynx is clear.   Eyes:      Extraocular Movements: Extraocular movements intact.   Cardiovascular:      Rate and Rhythm: Normal rate.   Pulmonary:      Effort: Pulmonary effort is normal.      Comments: R CT in place  Abdominal:      General: There is no distension.      Palpations: Abdomen is soft.      Tenderness: There is no abdominal tenderness.   Musculoskeletal:         General: No swelling.      Cervical back: Normal range of motion.   Skin:     General: Skin is warm and dry.   Neurological:      Mental Status: He is alert. Mental status is at baseline.   Psychiatric:         Mood and Affect: Mood normal.           Lab Results: I have reviewed the following results:  Recent Labs     02/20/25  0522 02/21/25  0521   WBC 10.67*  --    HGB 15.3  --    HCT 45.5  --      --    SODIUM 139 141   K 4.1 4.0    104   CO2 28 29   BUN 13 14   CREATININE 0.89 0.85   GLUC 113 104   MG 2.0  --        Imaging Results Review: I reviewed radiology reports from this admission including: chest xray.  Other Study Results Review: No additional pertinent studies reviewed.    VTE Pharmacologic Prophylaxis: VTE covered by:  enoxaparin, Subcutaneous, 40 mg at 02/21/25 0813     VTE Mechanical Prophylaxis: sequential compression device

## 2025-02-23 ENCOUNTER — APPOINTMENT (INPATIENT)
Dept: RADIOLOGY | Facility: HOSPITAL | Age: 30
DRG: 165 | End: 2025-02-23
Payer: COMMERCIAL

## 2025-02-23 VITALS
HEART RATE: 91 BPM | TEMPERATURE: 97.5 F | RESPIRATION RATE: 16 BRPM | DIASTOLIC BLOOD PRESSURE: 87 MMHG | HEIGHT: 66 IN | BODY MASS INDEX: 20.16 KG/M2 | OXYGEN SATURATION: 99 % | WEIGHT: 125.44 LBS | SYSTOLIC BLOOD PRESSURE: 123 MMHG

## 2025-02-23 PROCEDURE — 71046 X-RAY EXAM CHEST 2 VIEWS: CPT

## 2025-02-23 PROCEDURE — 99024 POSTOP FOLLOW-UP VISIT: CPT | Performed by: SURGERY

## 2025-02-23 RX ADMIN — PANTOPRAZOLE SODIUM 40 MG: 40 TABLET, DELAYED RELEASE ORAL at 05:04

## 2025-02-23 RX ADMIN — ENOXAPARIN SODIUM 40 MG: 40 INJECTION SUBCUTANEOUS at 08:15

## 2025-02-23 RX ADMIN — GABAPENTIN 300 MG: 300 CAPSULE ORAL at 08:15

## 2025-02-23 RX ADMIN — HYDROMORPHONE HYDROCHLORIDE 0.5 MG: 1 INJECTION, SOLUTION INTRAMUSCULAR; INTRAVENOUS; SUBCUTANEOUS at 08:12

## 2025-02-23 RX ADMIN — ACETAMINOPHEN 975 MG: 325 TABLET, FILM COATED ORAL at 05:04

## 2025-02-23 NOTE — ASSESSMENT & PLAN NOTE
29-year-old male with history of spontaneous pneumothorax.  Now status post 2/19 VATS blebectomy and mechanical pleurodesis.    R chest tube (-8, -AL): 25 cc serosanguinous    Plan:   - Continue diet as tolerated  - CT to waterseal yesterday, plan to DC today with post-pull CXR  - Pain and nausea control PRN  - Encourage IS, ambulation   - PT/OT eval and treat   - Appreciate CM for dispo planning   - DVT ppx  - no further labs needed  - Dispo planning

## 2025-02-23 NOTE — QUICK NOTE
Thoracic Surgery Chest Tube Removal:     Chest tube removed in routine fashion without incident. The patient tolerated the procedure well. A dry, sterile dressing was placed. Will check a pa/lat chest x-ray.     [facility]     ---  Liudmila Carbone MD  General Surgery PGY-II

## 2025-02-23 NOTE — DISCHARGE SUMMARY
"Discharge Summary - Thoracic    Name: Jaswant Saunders 29 y.o. male I MRN: [id number]  Unit/Bed#: [facility] 524-01 I Date of Admission: 2/19/2025   Date of Service: 2/23/2025 I Hospital Day: 4    Admission Date:   Admission Orders (From admission, onward)       Ordered        02/19/25 1559  Inpatient Admission  Once                           Discharge Date: 2/23/25    Admitting Diagnosis: Recurrent spontaneous pneumothorax [J93.83]  Discharge Diagnosis: Recurrent spontaneous pneumothorax [J93.83] s/p 2/19 VATS blebectomy and mechanical pleurodesis    Medical Problems       Resolved Problems  Date Reviewed: 2/19/2025   None         Attending: Dr. Scott    Procedures Performed: 2/19 VATS blebectomy and mechanical pleurodesis    Pathology: 2/19 RUL/apical wedge - pending    Hospital Course: H&P per Karolyn Scott MD and Amylyn Jena Mortimer, PA-C 2/4:  \"Jaswant is a 29 y.o. male who presents today for updated history and physical prior to planned right VATS blebectomy and mechanical pleurodesis with Dr. Scott on 2/19/2025. This is being done after two episodes of spontaneous right pneumothorax. Pt denies any interval changes in health. I explained the procedure and expected hospital course again with patient. Consent was previously signed. He plans to go for his routine pre-op labs later today. Will proceed as planned. All questions answered.\"    Patient presented 2/19 for elective right thorascopic blebectomy and mechanical pleurodesis.  Pain and nausea control as needed postoperatively.  Diet advanced as tolerated, regular diet.  One right-sided chest tube in place, on -20 suction for 3 days.  Chest tube present transition to waterseal -8 suction.  No airleak, serosanguineous drainage.      On day of discharge, patient reports controlled level of pain.  Tolerating diet.  Having flatus and bowel movements.  Voiding.  Ambulating.  Using incentive spirometry 2/17/1951.  Denies nausea, vomiting, fevers, chills, chest " pain, shortness of breath.    Chest tube pulled on 2/24. Post-pull PA/lateral x-ray performed. This demonstrated a slight increase in small right apical pneumothorax.  Patient denying any symptoms including shortness of breath, chest pain.  Heart rate in the 80s, saturating 97 to 100% on room air.  Strict return precautions and discharge instructions discussed with patient bedside.  All questions answered at this time.    Seen by PT and OT, no needs.    Condition at Discharge: Stable    Discharge instructions/Information to patient and family:   See after visit summary for information provided to patient and family.      Provisions for Follow-Up Care:  See after visit summary for information related to follow-up care and any pertinent home health orders.      Disposition: Home    Planned Readmission: No    Discharge Statement:  I have spent a total time of 25 minutes in caring for this patient on the day of the visit/encounter.    Discharge Medications:  See after visit summary for reconciled discharge medications provided to patient and family.      ---  Liudmila Carbone MD  General Surgery PGY-II

## 2025-02-23 NOTE — PROGRESS NOTES
Progress Note - Surgery-General   Name: Jaswant Saunders 29 y.o. male I MRN: [id number]  Unit/Bed#: [facility] 524-01 I Date of Admission: 2/19/2025   Date of Service: 2/22/2025 I Hospital Day: 3    Assessment & Plan  Recurrent spontaneous pneumothorax  29-year-old male with history of spontaneous pneumothorax.  Now status post 2/19 VATS blebectomy and mechanical pleurodesis.    R chest tube (-8, -AL): 25 cc serosanguinous    Plan:   - Continue diet as tolerated  - CT to waterseal yesterday, plan to DC today with post-pull CXR  - Pain and nausea control PRN  - Encourage IS, ambulation   - PT/OT eval and treat   - Appreciate CM for dispo planning   - DVT ppx  - no further labs needed  - Dispo planning    Please contact the SecureChat role for the Thoracic  service with any questions/concerns.    Subjective   No acute events overnight. Patient reports pain is at site of tube, otherwise no pain. They are tolerating their diet. They are having flatus and BM. They are voiding. They are ambulating. They are using their IS to 1750. They deny nausea, vomiting, chest pain, shortness of breath, fevers, chills.      Objective :  Temp:  [97.3 °F (36.3 °C)-98.7 °F (37.1 °C)] 97.7 °F (36.5 °C)  HR:  [60-77] 77  BP: (122-125)/(81-89) 124/84  Resp:  [17-18] 18  SpO2:  [96 %-99 %] 96 %  O2 Device: None (Room air)    I/O         02/21 0701  02/22 0700 02/22 0701  02/23 0700    P.O. 960 780    I.V. (mL/kg)      Total Intake(mL/kg) 960 (16.9) 780 (13.8)    Urine (mL/kg/hr) 0 (0) 900 (1.1)    Chest Tube 75 0    Total Output 75 900    Net +885 -120          Unmeasured Urine Occurrence 11 x           Lines/Drains/Airways       Active Status       Name Placement date Placement time Site Days    Chest Tube Right Pleural 24 Fr. 02/19/25  1534  Pleural  3                  Physical Exam  Constitutional:       General: He is not in acute distress.  HENT:      Head: Normocephalic and atraumatic.      Right Ear: External ear normal.      Left Ear: External  ear normal.      Nose: Nose normal.      Mouth/Throat:      Pharynx: Oropharynx is clear.   Eyes:      Extraocular Movements: Extraocular movements intact.   Cardiovascular:      Rate and Rhythm: Normal rate.   Pulmonary:      Effort: Pulmonary effort is normal.      Comments: R chest tube in place  Abdominal:      General: There is no distension.      Palpations: Abdomen is soft.      Tenderness: There is no abdominal tenderness.   Musculoskeletal:         General: No swelling.      Cervical back: Normal range of motion.   Skin:     General: Skin is warm and dry.   Neurological:      Mental Status: He is alert. Mental status is at baseline.   Psychiatric:         Mood and Affect: Mood normal.           Lab Results: I have reviewed the following results:  Recent Labs     02/20/25  0522 02/21/25  0521   WBC 10.67*  --    HGB 15.3  --    HCT 45.5  --      --    SODIUM 139 141   K 4.1 4.0    104   CO2 28 29   BUN 13 14   CREATININE 0.89 0.85   GLUC 113 104   MG 2.0  --        Imaging Results Review: I reviewed radiology reports from this admission including: chest xray.  Other Study Results Review: No additional pertinent studies reviewed.    VTE Pharmacologic Prophylaxis: VTE covered by:  enoxaparin, Subcutaneous, 40 mg at 02/22/25 0841     VTE Mechanical Prophylaxis: sequential compression device

## 2025-02-25 ENCOUNTER — HOSPITAL ENCOUNTER (OUTPATIENT)
Dept: RADIOLOGY | Facility: HOSPITAL | Age: 30
Discharge: HOME | End: 2025-02-25
Payer: COMMERCIAL

## 2025-02-25 DIAGNOSIS — J93.83 RECURRENT SPONTANEOUS PNEUMOTHORAX: ICD-10-CM

## 2025-02-25 PROCEDURE — 88307 TISSUE EXAM BY PATHOLOGIST: CPT | Performed by: PATHOLOGY

## 2025-02-26 ENCOUNTER — HOSPITAL ENCOUNTER (OUTPATIENT)
Dept: RADIOLOGY | Facility: HOSPITAL | Age: 30
Discharge: HOME | End: 2025-02-26
Payer: COMMERCIAL

## 2025-02-26 PROCEDURE — 71046 X-RAY EXAM CHEST 2 VIEWS: CPT

## 2025-02-27 ENCOUNTER — TELEPHONE (OUTPATIENT)
Dept: CARDIAC SURGERY | Facility: CLINIC | Age: 30
End: 2025-02-27

## 2025-02-27 NOTE — TELEPHONE ENCOUNTER
Called patient based on results of chest x-ray obtained yesterday 2/26/2025 which showed a slight increase in the size of right apical pneumothorax.  I personally reviewed the images and compared to his previous chest x-ray on 2/23/2025.  The pneumothorax on both chest x-rays is quite small but there is a slight increase on the most recent one from yesterday.  Called patient to ensure that he did not have any worsening shortness of breath.  Patient denies any concerns.  He reports that he is getting gradually better each day.  Patient knows he can call the office with any questions or concerns and should consider going to emergency department should he have sudden significant shortness of breath.  Patient was without concerns at this time and will follow-up as scheduled for his appointment next week on 3/4/2025.

## 2025-03-04 ENCOUNTER — OFFICE VISIT (OUTPATIENT)
Dept: CARDIAC SURGERY | Facility: CLINIC | Age: 30
End: 2025-03-04

## 2025-03-04 ENCOUNTER — HOSPITAL ENCOUNTER (OUTPATIENT)
Dept: RADIOLOGY | Facility: HOSPITAL | Age: 30
Discharge: HOME | End: 2025-03-04
Payer: COMMERCIAL

## 2025-03-04 VITALS
WEIGHT: 125 LBS | DIASTOLIC BLOOD PRESSURE: 84 MMHG | BODY MASS INDEX: 20.09 KG/M2 | OXYGEN SATURATION: 99 % | HEIGHT: 66 IN | HEART RATE: 75 BPM | SYSTOLIC BLOOD PRESSURE: 110 MMHG

## 2025-03-04 DIAGNOSIS — J93.83 RECURRENT SPONTANEOUS PNEUMOTHORAX: Primary | ICD-10-CM

## 2025-03-04 DIAGNOSIS — J93.83 RECURRENT SPONTANEOUS PNEUMOTHORAX: ICD-10-CM

## 2025-03-04 PROCEDURE — 71046 X-RAY EXAM CHEST 2 VIEWS: CPT

## 2025-03-04 PROCEDURE — 99024 POSTOP FOLLOW-UP VISIT: CPT | Performed by: THORACIC SURGERY (CARDIOTHORACIC VASCULAR SURGERY)

## 2025-03-04 NOTE — ASSESSMENT & PLAN NOTE
Patient is postop from a right VATS blebectomy and mechanical pleurodesis.  He has a pneumothorax on his postop chest x-ray.  I will send him for another chest x-ray today to ensure that this has resolved.  I have scheduled the patient for another appointment 4 weeks from now for second postop visit.  He knows to call if anything changes in the interim    Orders:    XR chest pa and lateral; Future

## 2025-03-04 NOTE — PROGRESS NOTES
Name: Jaswant Saunders      : 1995      MRN: [id number]  Encounter Provider: Karolyn Scott MD  Encounter Date: 3/4/2025   Encounter department: [facility]  :  Assessment & Plan  Recurrent spontaneous pneumothorax  Patient is postop from a right VATS blebectomy and mechanical pleurodesis.  He has a pneumothorax on his postop chest x-ray.  I will send him for another chest x-ray today to ensure that this has resolved.  I have scheduled the patient for another appointment 4 weeks from now for second postop visit.  He knows to call if anything changes in the interim    Orders:    XR chest pa and lateral; Future        Thoracic History   Date: 25  Diagnosis: Bleb disease and spontaneous ptx  Procedures/Surgeries: L VATS bleb resection and mechanical pleurodesis    Problem   Recurrent Spontaneous Pneumothorax    Present on admission, after a flight from [place]  Alpha 1 antitrypsin level is normal, negative HIV  No history of tobacco use  Repeat chest x-ray shows resolution of pneumothorax, a pigtail catheter in place, discussed with ICU attending ,tube will be sealed and can be removed tomorrow.          History of Present Illness   HPI Preop ECOG 0    Jaswant Saunders is a 29 y.o. male who is known to me.  He underwent a left VATS bleb resection and mechanical pleurodesis on 2025.  He presents today for his first postoperative visit.  He underwent a chest x-ray approximately 1 week ago, which I personally reviewed in PACS.  This does show an increasing apical pneumothorax.    Today in the office, the patient is doing well.  He has no complaints.  He denies any shortness of breath or chest pain.  He denies any pleuritic chest pain.  He does report some numbness over his incision sites.    Review of Systems   Constitutional: Negative.  Negative for activity change, chills, fatigue, fever and unexpected weight change.   HENT:  Negative for sore throat, trouble swallowing and  voice change.    Eyes: Negative.  Negative for visual disturbance.   Respiratory:  Negative for cough, chest tightness, shortness of breath, wheezing and stridor.    Cardiovascular:  Negative for chest pain and leg swelling.   Gastrointestinal: Negative.    Endocrine: Negative.    Genitourinary: Negative.    Musculoskeletal: Negative.    Skin: Negative.    Allergic/Immunologic: Negative.    Neurological:  Negative for seizures, syncope, speech difficulty, weakness, light-headedness and headaches.   Hematological:  Negative for adenopathy.   Psychiatric/Behavioral: Negative.        Medical History Reviewed by provider this encounter:     .  Past Medical History   Past Medical History:   Diagnosis Date    Pneumothorax     Aug 2017, Nov 2024     Past Surgical History:   Procedure Laterality Date    ABDOMINAL WALL SURGERY N/A 11/27/2018    Procedure: Intermediate  CLOSURE WOUND  2 cm left neck;  Surgeon: Berto Chavez MD;  [facility];  Service: General    FACIAL/NECK BIOPSY Left 11/27/2018    Procedure: NECK MASS EXCISION BIOPSY;  Surgeon: Berto Chavez MD;  [facility];  Service: General    KNEE SURGERY      ACL    UT BRNCHSC INCL FLUOR GDNCE DX W/CELL WASHG SPX N/A 2/19/2025    Procedure: BRONCHOSCOPY FLEXIBLE;  Surgeon: Karolyn Scott MD;  [facility];  Service: Thoracic    UT THORACOSCOPY W/RESECTION BULLAE W/WO PLEURAL PX Right 2/19/2025    Procedure: right thoracoscopic blebectomy with mechanical pleurodesis;  Surgeon: Karolyn Scott MD;  [facility];  Service: Thoracic     Family History   Family history unknown: Yes      reports that he quit smoking about 13 years ago. His smoking use included cigarettes. He has never used smokeless tobacco. He reports that he does not currently use alcohol after a past usage of about 2.0 standard drinks of alcohol per week. He reports that he does not currently use drugs after having used the following drugs:  "Marijuana.  Current Outpatient Medications   Medication Instructions    gabapentin (NEURONTIN) 300 mg, Oral, 3 times daily    oxyCODONE (ROXICODONE) 5 mg, Oral, Every 4 hours PRN   No Known Allergies   Current Outpatient Medications on File Prior to Visit   Medication Sig Dispense Refill    gabapentin (NEURONTIN) 300 mg capsule Take 1 capsule (300 mg total) by mouth 3 (three) times a day for 21 days 63 capsule 0    oxyCODONE (ROXICODONE) 5 immediate release tablet Take 1 tablet (5 mg total) by mouth every 4 (four) hours as needed for severe pain for up to 20 doses Max Daily Amount: 30 mg (Patient not taking: Reported on 3/4/2025) 20 tablet 0     No current facility-administered medications on file prior to visit.      Social History     Tobacco Use    Smoking status: Former     Current packs/day: 0.00     Types: Cigarettes     Quit date:      Years since quittin.1    Smokeless tobacco: Never   Vaping Use    Vaping status: Never Used   Substance and Sexual Activity    Alcohol use: Not Currently     Alcohol/week: 2.0 standard drinks of alcohol     Types: 2 Cans of beer per week     Comment: socially     Drug use: Not Currently     Types: Marijuana     Comment: \"couple weeks ago\"    Sexual activity: Never        Objective   /84 (BP Location: Right arm, Patient Position: Sitting)   Pulse 75   Ht 5' 6\" (1.676 m)   Wt 56.7 kg (125 lb)   SpO2 99%   BMI 20.18 kg/m²     Pain Screening:     ECOG    Physical Exam  Vitals and nursing note reviewed.   Constitutional:       General: He is not in acute distress.     Appearance: Normal appearance. He is well-developed. He is not diaphoretic.   HENT:      Head: Normocephalic and atraumatic.      Nose: Nose normal. No congestion.      Mouth/Throat:      Mouth: Mucous membranes are moist.      Pharynx: Oropharynx is clear.   Eyes:      Extraocular Movements: Extraocular movements intact.      Pupils: Pupils are equal, round, and reactive to light.   Neck:      " Trachea: No tracheal deviation.   Cardiovascular:      Rate and Rhythm: Normal rate and regular rhythm.      Heart sounds: Normal heart sounds. No murmur heard.  Pulmonary:      Effort: Pulmonary effort is normal. No respiratory distress.      Breath sounds: Normal breath sounds. No stridor. No wheezing or rales.   Chest:      Chest wall: No tenderness.   Abdominal:      General: Bowel sounds are normal. There is no distension.      Palpations: Abdomen is soft.      Tenderness: There is no abdominal tenderness.   Musculoskeletal:         General: Normal range of motion.      Cervical back: Normal range of motion.   Lymphadenopathy:      Cervical: No cervical adenopathy.   Skin:     General: Skin is warm and dry.      Coloration: Skin is not pale.      Findings: No erythema or rash.      Comments: Chest tube stitch DC'd.  Incisions healing well.   Neurological:      Mental Status: He is alert and oriented to person, place, and time.   Psychiatric:         Behavior: Behavior normal.         Thought Content: Thought content normal.         Judgment: Judgment normal.         Labs:   Results for orders placed or performed during the hospital encounter of 02/19/25   CBC   Result Value Ref Range    WBC 8.49 4.31 - 10.16 Thousand/uL    RBC 5.54 3.88 - 5.62 Million/uL    Hemoglobin 17.1 (H) 12.0 - 17.0 g/dL    Hematocrit 52.2 (H) 36.5 - 49.3 %    MCV 94 82 - 98 fL    MCH 30.9 26.8 - 34.3 pg    MCHC 32.8 31.4 - 37.4 g/dL    RDW 12.1 11.6 - 15.1 %    Platelets 173 149 - 390 Thousands/uL    MPV 9.3 8.9 - 12.7 fL   Basic metabolic panel   Result Value Ref Range    Sodium 139 135 - 147 mmol/L    Potassium 4.8 3.5 - 5.3 mmol/L    Chloride 107 96 - 108 mmol/L    CO2 27 21 - 32 mmol/L    ANION GAP 5 4 - 13 mmol/L    BUN 11 5 - 25 mg/dL    Creatinine 0.91 0.60 - 1.30 mg/dL    Glucose 102 65 - 140 mg/dL    Calcium 9.4 8.4 - 10.2 mg/dL    eGFR 112 ml/min/1.73sq m   Result Value Ref Range    Magnesium 2.1 1.9 - 2.7 mg/dL   CBC   Result  Value Ref Range    WBC 10.67 (H) 4.31 - 10.16 Thousand/uL    RBC 4.94 3.88 - 5.62 Million/uL    Hemoglobin 15.3 12.0 - 17.0 g/dL    Hematocrit 45.5 36.5 - 49.3 %    MCV 92 82 - 98 fL    MCH 31.0 26.8 - 34.3 pg    MCHC 33.6 31.4 - 37.4 g/dL    RDW 12.0 11.6 - 15.1 %    Platelets 188 149 - 390 Thousands/uL    MPV 9.7 8.9 - 12.7 fL   Basic metabolic panel   Result Value Ref Range    Sodium 139 135 - 147 mmol/L    Potassium 4.1 3.5 - 5.3 mmol/L    Chloride 105 96 - 108 mmol/L    CO2 28 21 - 32 mmol/L    ANION GAP 6 4 - 13 mmol/L    BUN 13 5 - 25 mg/dL    Creatinine 0.89 0.60 - 1.30 mg/dL    Glucose 113 65 - 140 mg/dL    Calcium 9.1 8.4 - 10.2 mg/dL    eGFR 114 ml/min/1.73sq m   Result Value Ref Range    Magnesium 2.0 1.9 - 2.7 mg/dL   Basic metabolic panel   Result Value Ref Range    Sodium 141 135 - 147 mmol/L    Potassium 4.0 3.5 - 5.3 mmol/L    Chloride 104 96 - 108 mmol/L    CO2 29 21 - 32 mmol/L    ANION GAP 8 4 - 13 mmol/L    BUN 14 5 - 25 mg/dL    Creatinine 0.85 0.60 - 1.30 mg/dL    Glucose 104 65 - 140 mg/dL    Calcium 9.2 8.4 - 10.2 mg/dL    eGFR 116 ml/min/1.73sq m   Tissue Exam   Result Value Ref Range    Case Report       Surgical Pathology Report                         Case: [id number]                                  Authorizing Provider:  Karolyn Scott MD    Collected:           02/19/2025 1503              Ordering Location:     [facility]      Received:            02/19/2025 [facility]                                     Hospital Operating Room                                                      Pathologist:           Ayden Rodriguez MD                                                         Specimens:   A) - Lung, RIGHT Apical Wedge                                                                       B) - Lung, Right Upper Lobe, RUL Wedge                                                     Final Diagnosis       A.  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     - No malignancy identified.    B.  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     - No malignancy identified.        Additional Information       All reported additional testing was performed with appropriately reactive controls.  These tests were developed and their performance characteristics determined by [facility] or appropriate performing facility, though some tests may be performed on tissues which have not been validated for performance characteristics (such as staining performed on alcohol exposed cell blocks and decalcified tissues).  Results should be interpreted with caution and in the context of the patients’ clinical condition. These tests may not be cleared or approved by the U.S. Food and Drug Administration, though the FDA has determined that such clearance or approval is not necessary. These tests are used for clinical purposes and they should not be regarded as investigational or for research. This laboratory has been approved by CLIA 88, designated as a high-complexity laboratory and is qualified to perform these tests.  .  - Interpretation performed at [facility], [address] [place] [address]       Gross Description       A. The specimen is received in formalin, labeled with the patient's name and hospital number, and is designated \" right apical lung wedge\".  The specimen consists of a wedge of lung measuring 6 x 2.8 x 2.3 cm.  The pleura is tan-purple, smooth, glistening.  A row of metallic staples is present along 1 edge.  The staple line is removed and the underlying exposed lung parenchyma is inked blue.  The specimen is sectioned perpendicular to the inked parenchyma revealing multiple subpleural cystic areas.  Representative sections. Four cassettes.  B. The specimen is received in formalin, labeled with the patient's name and hospital number, and is " "designated \" right upper lobe lung wedge\".  The specimen consists of a wedge of lung measuring 3.3 x 1 x 1 cm.  The pleura is tan-purple, smooth, glistening.  A row of metallic staples is present along 1 edge.  The staple line is removed and the underlying exposed lung parenchyma is inked blue.  The specimen is bisected lengthwise perpendicular to the inked parenchyma revealing a single subpleural cystic area.  The bisected specimen is submitted for histologic examination in 1 cassette.    Note: The estimated total formalin fixation time based upon information provided by the submitting clinician and the standard processing schedule is under 72 hours.    [facility]          Radiology Results Review : I have reviewed images/report studies in PACS as described above (in the HPI).  Pathology: I have reviewed pathology reports described above.      "

## 2025-04-01 ENCOUNTER — OFFICE VISIT (OUTPATIENT)
Dept: CARDIAC SURGERY | Facility: CLINIC | Age: 30
End: 2025-04-01

## 2025-04-01 VITALS
HEIGHT: 66 IN | HEART RATE: 85 BPM | SYSTOLIC BLOOD PRESSURE: 98 MMHG | BODY MASS INDEX: 20.09 KG/M2 | DIASTOLIC BLOOD PRESSURE: 82 MMHG | OXYGEN SATURATION: 96 % | WEIGHT: 125 LBS

## 2025-04-01 DIAGNOSIS — J93.83 RECURRENT SPONTANEOUS PNEUMOTHORAX: Primary | ICD-10-CM

## 2025-04-01 PROCEDURE — 99024 POSTOP FOLLOW-UP VISIT: CPT | Performed by: PHYSICIAN ASSISTANT

## 2025-04-01 NOTE — ASSESSMENT & PLAN NOTE
Jaswant is 6 weeks out from left VATS bleb resection and mechanical pleurodesis and treatment of a recurrent spontaneous left pneumothorax. He is currently getting over an URI but denies any pulmonary complaints. I discussed with patient that as surgery irritates the intercostal nerve bundles, it is not uncommon to have nerve pain or numbness for weeks to months after surgery. He should continue to surveil this and let us know of any worsening of symptoms. I would like him to get a repeat CXR to ensure that he does not have any increase in size of previously seen left apical PTX. I will call him with the results. Otherwise, he can follow up with our office on an as needed basis. Pt is in agreement with plan.     Orders:    XR chest pa and lateral; Future

## 2025-04-01 NOTE — PROGRESS NOTES
Name: Jaswant Saunders      : 1995      MRN: [id number]  Encounter Provider: [facility]  Encounter Date: 2025   Encounter department: [facility]  :  Assessment & Plan  Recurrent spontaneous pneumothorax  Jaswant is 6 weeks out from left VATS bleb resection and mechanical pleurodesis and treatment of a recurrent spontaneous left pneumothorax. He is currently getting over an URI but denies any pulmonary complaints. I discussed with patient that as surgery irritates the intercostal nerve bundles, it is not uncommon to have nerve pain or numbness for weeks to months after surgery. He should continue to surveil this and let us know of any worsening of symptoms. I would like him to get a repeat CXR to ensure that he does not have any increase in size of previously seen left apical PTX. I will call him with the results. Otherwise, he can follow up with our office on an as needed basis. Pt is in agreement with plan.     Orders:    XR chest pa and lateral; Future        Thoracic History     Problem   Recurrent Spontaneous Pneumothorax    Present on admission, after a flight from [place]  Alpha 1 antitrypsin level is normal, negative HIV  No history of tobacco use  Repeat chest x-ray shows resolution of pneumothorax, a pigtail catheter in place, discussed with ICU attending ,tube will be sealed and can be removed tomorrow.          History of Present Illness     HPI Jaswant Saunders is a 29 y.o. male who is s/p left VATS bleb resection and mechanical pleurodesis on 2025. Patient had a chest x-ray 1 week out from surgery which showed an increasing left apical pneumothorax.  He was last seen 1 month ago where a new chest x-ray was obtained which showed decreasing size of this apical pneumothorax.  He now presents today for follow-up.    He reports that he is currently getting over an upper respiratory illness.  He complains of sore throat, congestion, and rhinorrhea.  He  denies any shortness of breath, CP, F/C, hemoptysis or productive cough.    His incisions have been healing well.  He does have persistent left chest wall numbness since surgery.  No chest wall pain.    No 30 day readmission.     Review of Systems   Constitutional:  Negative for chills, diaphoresis, fever and unexpected weight change.   HENT:  Positive for congestion, rhinorrhea and sore throat.    Eyes: Negative.    Respiratory:  Negative for cough, shortness of breath and wheezing.         Left chest wall numbness along course of chest tube incision, no pain   Cardiovascular:  Negative for chest pain and leg swelling.   Gastrointestinal:  Negative for abdominal pain, diarrhea and nausea.   Skin: Negative.    Psychiatric/Behavioral: Negative.     All other systems reviewed and are negative.     Medical History Reviewed by provider this encounter:  Meds     .  Past Medical History   Past Medical History:   Diagnosis Date    Pneumothorax     Aug 2017, Nov 2024     Past Surgical History:   Procedure Laterality Date    ABDOMINAL WALL SURGERY N/A 11/27/2018    Procedure: Intermediate  CLOSURE WOUND  2 cm left neck;  Surgeon: Berto Chavez MD;  [facility];  Service: General    FACIAL/NECK BIOPSY Left 11/27/2018    Procedure: NECK MASS EXCISION BIOPSY;  Surgeon: Berto Chavez MD;  [facility];  Service: General    KNEE SURGERY      ACL    ID BRNCC INCL FLUOR GDNCE DX W/CELL WASHG SPX N/A 2/19/2025    Procedure: BRONCHOSCOPY FLEXIBLE;  Surgeon: Karolyn Scott MD;  [facility];  Service: Thoracic    ID THORACOSCOPY W/RESECTION BULLAE W/WO PLEURAL PX Right 2/19/2025    Procedure: right thoracoscopic blebectomy with mechanical pleurodesis;  Surgeon: Karolyn Scott MD;  [facility];  Service: Thoracic     Family History   Family history unknown: Yes      reports that he quit smoking about 13 years ago. His smoking use included cigarettes. He has never used smokeless  "tobacco. He reports that he does not currently use alcohol after a past usage of about 2.0 standard drinks of alcohol per week. He reports that he does not currently use drugs after having used the following drugs: Marijuana.  No current outpatient medications  No Known Allergies   Current Outpatient Medications on File Prior to Visit   Medication Sig Dispense Refill    [DISCONTINUED] gabapentin (NEURONTIN) 300 mg capsule Take 1 capsule (300 mg total) by mouth 3 (three) times a day for 21 days 63 capsule 0    [DISCONTINUED] oxyCODONE (ROXICODONE) 5 immediate release tablet Take 1 tablet (5 mg total) by mouth every 4 (four) hours as needed for severe pain for up to 20 doses Max Daily Amount: 30 mg 20 tablet 0     No current facility-administered medications on file prior to visit.      Social History     Tobacco Use    Smoking status: Former     Current packs/day: 0.00     Types: Cigarettes     Quit date:      Years since quittin.2    Smokeless tobacco: Never   Vaping Use    Vaping status: Never Used   Substance and Sexual Activity    Alcohol use: Not Currently     Alcohol/week: 2.0 standard drinks of alcohol     Types: 2 Cans of beer per week     Comment: socially     Drug use: Not Currently     Types: Marijuana     Comment: \"couple weeks ago\"    Sexual activity: Never        Objective   BP 98/82 (BP Location: Left arm, Patient Position: Sitting)   Pulse 85   Ht 5' 6\" (1.676 m)   Wt 56.7 kg (125 lb)   SpO2 96%   BMI 20.18 kg/m²     Pain Screening:     ECOG    Physical Exam  Vitals reviewed.   Constitutional:       General: He is not in acute distress.     Appearance: Normal appearance. He is not ill-appearing.   HENT:      Head: Normocephalic.   Cardiovascular:      Rate and Rhythm: Normal rate.   Pulmonary:      Effort: Pulmonary effort is normal.      Breath sounds: Normal breath sounds. No wheezing, rhonchi or rales.      Comments: Incisions healing well.  Abdominal:      Palpations: Abdomen is " soft.   Musculoskeletal:         General: No swelling. Normal range of motion.   Skin:     General: Skin is warm.   Neurological:      General: No focal deficit present.      Mental Status: He is alert and oriented to person, place, and time.   Psychiatric:         Mood and Affect: Mood normal.

## 2025-05-16 ENCOUNTER — TELEPHONE (OUTPATIENT)
Dept: CARDIAC SURGERY | Facility: CLINIC | Age: 30
End: 2025-05-16

## 2025-05-16 NOTE — TELEPHONE ENCOUNTER
I called the pt and left a message informing the pt that he should get a CXR done. At his last visit on 04/01/2025 with Dr. Scott, she stated she wanted him to get a repeat CXR and she will call him with the results but the pt never got it done. I advised him to please get the CXR done and one of our PA's will call him with the results. I also advised him to please call the office if he has any questions regarding this.

## 2025-05-19 ENCOUNTER — HOSPITAL ENCOUNTER (OUTPATIENT)
Dept: RADIOLOGY | Facility: HOSPITAL | Age: 30
Discharge: HOME | End: 2025-05-19
Payer: COMMERCIAL

## 2025-05-19 DIAGNOSIS — J93.83 RECURRENT SPONTANEOUS PNEUMOTHORAX: ICD-10-CM

## 2025-05-19 PROCEDURE — 71046 X-RAY EXAM CHEST 2 VIEWS: CPT

## (undated) DEVICE — UTILITY MARKER,BLACK WITH LABELS: Brand: DEVON

## (undated) DEVICE — ELECTRODE BLADE MOD E-Z CLEAN 2.5IN 6.4CM -0012M

## (undated) DEVICE — GLOVE SRG BIOGEL ECLIPSE 6

## (undated) DEVICE — PACK CUSTOM THORACIC RF

## (undated) DEVICE — SINGLE TUBING WITH LARGE CONNECTOR FOR THORACIC SUCTION SYSTEM PUMP: Brand: THOPAZ TUBING SINGLE

## (undated) DEVICE — SUT VICRYL 0 CT-1 27 IN J260H

## (undated) DEVICE — SINGLE USE SUCTION VALVE MAJ-209: Brand: SINGLE USE SUCTION VALVE (STERILE)

## (undated) DEVICE — GLOVE INDICATOR PI UNDERGLOVE SZ 7 BLUE

## (undated) DEVICE — ARTICULATION RELOAD WITH TRI-STAPLE TECHNOLOGY: Brand: ENDO GIA

## (undated) DEVICE — INTENDED FOR TISSUE SEPARATION, AND OTHER PROCEDURES THAT REQUIRE A SHARP SURGICAL BLADE TO PUNCTURE OR CUT.: Brand: BARD-PARKER SAFETY BLADES SIZE 15, STERILE

## (undated) DEVICE — CHLORAPREP HI-LITE 10.5ML ORANGE

## (undated) DEVICE — PROGEL

## (undated) DEVICE — PAD GROUNDING DUAL ADULT

## (undated) DEVICE — CANISTER FOR THORACIC SUCTION SYSTEM: Brand: THOPAZ DISPOSABLE CANISTER 0.8L

## (undated) DEVICE — FIRST STEP BEDSIDE KIT - STAND-UP POUCH, ENDOSCOPIC CLEANING PAD - 1 POUCH: Brand: FIRST STEP BEDSIDE KIT - STAND-UP POUCH, ENDOSCOPIC CLEANING PAD

## (undated) DEVICE — SUT MONOCRYL 4-0 PS-2 18 IN Y496G

## (undated) DEVICE — ADHESIVE SKN CLSR HISTOACRYL FLEX 0.5ML LF

## (undated) DEVICE — SUT VICRYL 2-0 SH 27 IN UNDYED J417H

## (undated) DEVICE — PLUMEPEN PRO 10FT

## (undated) DEVICE — SURGICEL 4 X 8IN

## (undated) DEVICE — GLOVE INDICATOR PI UNDERGLOVE SZ 6.5 BLUE

## (undated) DEVICE — WOUND RETRACTOR AND PROTECTOR: Brand: ALEXIS WOUND PROTECTOR-RETRACTOR

## (undated) DEVICE — POWER SHELL: Brand: SIGNIA

## (undated) DEVICE — INTENDED FOR TISSUE SEPARATION, AND OTHER PROCEDURES THAT REQUIRE A SHARP SURGICAL BLADE TO PUNCTURE OR CUT.: Brand: BARD-PARKER SAFETY BLADES SIZE 10, STERILE

## (undated) DEVICE — NEEDLE 25G X 1 1/2

## (undated) DEVICE — TUBING SUCTION 5MM X 12 FT

## (undated) DEVICE — GAUZE SPONGES,USP TYPE VII GAUZE, 12 PLY: Brand: CURITY

## (undated) DEVICE — REM POLYHESIVE ADULT PATIENT RETURN ELECTRODE: Brand: VALLEYLAB

## (undated) DEVICE — DRAPE EQUIPMENT RF WAND

## (undated) DEVICE — EXOFIN PRECISION PEN HIGH VISCOSITY TOPICAL SKIN ADHESIVE: Brand: EXOFIN PRECISION PEN, 1G

## (undated) DEVICE — SUT PROLENE 0 CT-1 30 IN 8424H

## (undated) DEVICE — 24 FR STRAIGHT – SOFT PVC CATHETER: Brand: PVC THORACIC CATHETERS

## (undated) DEVICE — BETHLEHEM UNIVERSAL MINOR GEN: Brand: CARDINAL HEALTH

## (undated) DEVICE — NEEDLE SPINAL 20G X 3.5 LF

## (undated) DEVICE — CHLORAPREP HI-LITE 26ML ORANGE

## (undated) DEVICE — GLOVE SRG BIOGEL ECLIPSE 7

## (undated) DEVICE — LIGHT HANDLE COVER SLEEVE DISP BLUE STELLAR

## (undated) DEVICE — GAUZE SPONGES,16 PLY: Brand: CURITY

## (undated) DEVICE — ANTIBACTERIAL UNDYED BRAIDED (POLYGLACTIN 910), SYNTHETIC ABSORBABLE SUTURE: Brand: COATED VICRYL

## (undated) DEVICE — PROGEL LONG TIP 11 INCH

## (undated) DEVICE — SUT VICRYL 0 UR-6 27 IN J603H

## (undated) DEVICE — MEDI-VAC YANKAUER SUCTION HANDLE W/STRAIGHT TIP & CONTROL VENT: Brand: CARDINAL HEALTH

## (undated) DEVICE — SINGLE USE BIOPSY VALVE MAJ-210: Brand: SINGLE USE BIOPSY VALVE (STERILE)